# Patient Record
Sex: FEMALE | Race: WHITE
[De-identification: names, ages, dates, MRNs, and addresses within clinical notes are randomized per-mention and may not be internally consistent; named-entity substitution may affect disease eponyms.]

---

## 2024-10-14 ENCOUNTER — OFFICE VISIT (OUTPATIENT)
Dept: PRIMARY CARE CLINIC | Facility: CLINIC | Age: 64
End: 2024-10-14
Payer: COMMERCIAL

## 2024-10-14 VITALS
TEMPERATURE: 98.7 F | BODY MASS INDEX: 33.85 KG/M2 | RESPIRATION RATE: 18 BRPM | DIASTOLIC BLOOD PRESSURE: 91 MMHG | WEIGHT: 210.6 LBS | SYSTOLIC BLOOD PRESSURE: 158 MMHG | HEART RATE: 98 BPM | HEIGHT: 66 IN

## 2024-10-14 DIAGNOSIS — R03.0 ELEVATED BLOOD PRESSURE READING IN OFFICE WITHOUT DIAGNOSIS OF HYPERTENSION: ICD-10-CM

## 2024-10-14 DIAGNOSIS — N64.4 BREAST PAIN, RIGHT: Primary | ICD-10-CM

## 2024-10-14 DIAGNOSIS — N64.89 BREAST EDEMA: ICD-10-CM

## 2024-10-14 PROBLEM — M79.672 LEFT FOOT PAIN: Status: RESOLVED | Noted: 2019-09-11 | Resolved: 2024-10-14

## 2024-10-14 PROCEDURE — 99203 OFFICE O/P NEW LOW 30 MIN: CPT | Performed by: NURSE PRACTITIONER

## 2024-10-14 RX ORDER — CEPHALEXIN 500 MG/1
500 CAPSULE ORAL 4 TIMES DAILY
Qty: 28 CAPSULE | Refills: 0 | Status: SHIPPED | OUTPATIENT
Start: 2024-10-14 | End: 2024-10-21

## 2024-10-14 ASSESSMENT — ENCOUNTER SYMPTOMS
BREAST PAIN: 1
SHORTNESS OF BREATH: 1

## 2024-10-14 NOTE — PROGRESS NOTES
for this visit on 10/14/24.     Assessment/Plan:  1. Breast pain, right  Comments:  Obtain imgaing of breast stat. Begin Keflex. Advise Tylenol prn. Discussed concern for symptoms and importance of obtaining imaging. ER precautions given.  Orders:  -     [facility] DIGITAL DIAGNOSTIC W OR WO CAD BILATERAL; Future  -     US BREAST COMPLETE RIGHT; Future  -     cephALEXin (KEFLEX) 500 MG capsule; Take 1 capsule by mouth 4 times daily for 7 days, Disp-28 capsule, R-0Normal  2. Breast edema  -     [facility] DIGITAL DIAGNOSTIC W OR WO CAD BILATERAL; Future  -     US BREAST COMPLETE RIGHT; Future  -     cephALEXin (KEFLEX) 500 MG capsule; Take 1 capsule by mouth 4 times daily for 7 days, Disp-28 capsule, R-0Normal  3. Elevated blood pressure reading in office without diagnosis of hypertension  Comments:  Pt has BP cuff at home. Obtain home readings and bring Bp log to f/u.         Return in about 2 weeks (around 10/28/2024), or if symptoms worsen or fail to improve, for follow-up, blood pressure check.      YARON Linares - NP

## 2024-10-24 ENCOUNTER — HOSPITAL ENCOUNTER (OUTPATIENT)
Dept: MAMMOGRAPHY | Age: 64
Discharge: HOME | End: 2024-10-27
Payer: COMMERCIAL

## 2024-10-24 VITALS — BODY MASS INDEX: 33.75 KG/M2 | WEIGHT: 210 LBS | HEIGHT: 66 IN

## 2024-10-24 DIAGNOSIS — N64.89 BREAST EDEMA: ICD-10-CM

## 2024-10-24 DIAGNOSIS — N64.4 BREAST PAIN, RIGHT: ICD-10-CM

## 2024-10-24 PROCEDURE — G0279 TOMOSYNTHESIS, MAMMO: HCPCS

## 2024-10-24 PROCEDURE — 76642 ULTRASOUND BREAST LIMITED: CPT

## 2024-10-30 ENCOUNTER — HOSPITAL ENCOUNTER (OUTPATIENT)
Dept: MAMMOGRAPHY | Age: 64
Discharge: HOME | End: 2024-11-02
Payer: COMMERCIAL

## 2024-10-30 DIAGNOSIS — R92.8 ABNORMAL MAMMOGRAM OF RIGHT BREAST: ICD-10-CM

## 2024-10-30 DIAGNOSIS — R92.8 ABNORMAL ULTRASOUND OF BREAST: ICD-10-CM

## 2024-10-30 PROCEDURE — 88305 TISSUE EXAM BY PATHOLOGIST: CPT

## 2024-10-30 PROCEDURE — 19083 BX BREAST 1ST LESION US IMAG: CPT

## 2024-10-30 PROCEDURE — 2500000003 HC RX 250 WO HCPCS: Performed by: NURSE PRACTITIONER

## 2024-10-30 PROCEDURE — 38505 NEEDLE BIOPSY LYMPH NODES: CPT

## 2024-10-30 PROCEDURE — 77065 DX MAMMO INCL CAD UNI: CPT

## 2024-10-30 RX ORDER — LIDOCAINE HYDROCHLORIDE AND EPINEPHRINE 10; 10 MG/ML; UG/ML
20 INJECTION, SOLUTION INFILTRATION; PERINEURAL ONCE
Status: COMPLETED | OUTPATIENT
Start: 2024-10-30 | End: 2024-10-30

## 2024-10-30 RX ORDER — LIDOCAINE HYDROCHLORIDE 10 MG/ML
5 INJECTION, SOLUTION INFILTRATION; PERINEURAL ONCE
Status: COMPLETED | OUTPATIENT
Start: 2024-10-30 | End: 2024-10-30

## 2024-10-30 RX ADMIN — LIDOCAINE HYDROCHLORIDE,EPINEPHRINE BITARTRATE 10 ML: 10; .01 INJECTION, SOLUTION INFILTRATION; PERINEURAL at 08:32

## 2024-10-30 RX ADMIN — LIDOCAINE HYDROCHLORIDE 5 ML: 10 INJECTION, SOLUTION INFILTRATION; PERINEURAL at 08:31

## 2024-10-30 ASSESSMENT — PAIN SCALES - GENERAL
PAINLEVEL_OUTOF10: 1
PAINLEVEL_OUTOF10: 1

## 2024-11-04 ENCOUNTER — CLINICAL DOCUMENTATION (OUTPATIENT)
Dept: MAMMOGRAPHY | Age: 64
End: 2024-11-04

## 2024-11-04 NOTE — PROGRESS NOTES
Patient and her mother came to the breast center today to review the results of her breast biopsy. Pathology:Phyllodes tumor, axilla benign. She had no post biopsy issues or concerns however the mass is quite large and extremely uncomfortable for her.    Based on the pathology report this tumor is considered \"cytologic atypia\". These features are suspicious for malignancy. She will meet with Dr Cabral for her next steps. Dr Washington does not believe that an MRI will be suitable due to the increased size of her breast.    I explained that we only got a small sample of a very large mass and we may not know for sure if there are any malignant cells until it is removed in its entirety.

## 2024-11-21 ENCOUNTER — OFFICE VISIT (OUTPATIENT)
Dept: SURGERY | Age: 64
End: 2024-11-21
Payer: COMMERCIAL

## 2024-11-21 VITALS — BODY MASS INDEX: 33.75 KG/M2 | WEIGHT: 210 LBS | HEIGHT: 66 IN

## 2024-11-21 DIAGNOSIS — B37.2 SKIN YEAST INFECTION: ICD-10-CM

## 2024-11-21 DIAGNOSIS — D49.2 SOFT TISSUE NEOPLASM: ICD-10-CM

## 2024-11-21 DIAGNOSIS — C80.1 MALIGNANT NEOPLASM (HCC): ICD-10-CM

## 2024-11-21 DIAGNOSIS — N63.10 LARGE MASS OF RIGHT BREAST: Primary | ICD-10-CM

## 2024-11-21 DIAGNOSIS — Z85.3 PERSONAL HISTORY OF MALIGNANT PHYLLOIDES TUMOR OF BREAST: ICD-10-CM

## 2024-11-21 PROCEDURE — 99204 OFFICE O/P NEW MOD 45 MIN: CPT | Performed by: SURGERY

## 2024-11-21 RX ORDER — FLUCONAZOLE 150 MG/1
150 TABLET ORAL DAILY
Qty: 3 TABLET | Refills: 0 | Status: SHIPPED | OUTPATIENT
Start: 2024-11-21 | End: 2024-11-24

## 2024-11-21 NOTE — PROGRESS NOTES
?Review of prior external note(s) from each unique source*;  ?Review of the result(s) of each unique test*;   ?Ordering of each unique test*;   ?Assessment requiring an independent historian(s)    or   Category 2: Independent interpretation of tests   ?Independent interpretation of a test performed by another physician/other qualified health care professional (not separately reported);     or  Category 3: Discussion of management or test interpretation  ?Discussion of management or test interpretation with external physician/other qualified health care professional/appropriate source (not separately reported)   High risk of morbidity from additional diagnostic testing or treatment  Examples only:  ?Drug therapy requiring intensive monitoring for toxicity  ?Decision regarding elective major surgery with identified patient or procedure risk factors - no surgery yet  ?Decision regarding emergency major surgery  ?Decision regarding hospitalization  ?Decision not to resuscitate or to de-escalate care because of poor prognosis      Parenteral controlled substances             I have personally performed a face-to-face diagnostic evaluation and management  service on this patient.      I have independently seen the patient.   I have independently obtained the above history from the patient/family.    I have independently examined the patient with above findings.  I have independently reviewed data/labs for this patient and developed the above plan of care (MDM).      Signed: MICKY POWERS MD  11/21/2024    11:13 AM

## 2024-11-25 ENCOUNTER — APPOINTMENT (OUTPATIENT)
Dept: GENERAL RADIOLOGY | Age: 64
End: 2024-11-25
Payer: COMMERCIAL

## 2024-11-25 ENCOUNTER — HOSPITAL ENCOUNTER (EMERGENCY)
Age: 64
Discharge: HOME | End: 2024-11-26
Attending: STUDENT IN AN ORGANIZED HEALTH CARE EDUCATION/TRAINING PROGRAM
Payer: COMMERCIAL

## 2024-11-25 DIAGNOSIS — R11.2 NAUSEA AND VOMITING, UNSPECIFIED VOMITING TYPE: ICD-10-CM

## 2024-11-25 DIAGNOSIS — N30.00 ACUTE CYSTITIS WITHOUT HEMATURIA: ICD-10-CM

## 2024-11-25 DIAGNOSIS — R52 BODY ACHES: Primary | ICD-10-CM

## 2024-11-25 LAB
BASOPHILS # BLD: 0 K/UL (ref 0–0.2)
BASOPHILS NFR BLD: 1 % (ref 0–2)
DIFFERENTIAL METHOD BLD: ABNORMAL
EOSINOPHIL # BLD: 0 K/UL (ref 0–0.8)
EOSINOPHIL NFR BLD: 0 % (ref 0.5–7.8)
ERYTHROCYTE [DISTWIDTH] IN BLOOD BY AUTOMATED COUNT: 12.1 % (ref 11.9–14.6)
HCT VFR BLD AUTO: 36.8 % (ref 35.8–46.3)
HGB BLD-MCNC: 11.9 G/DL (ref 11.7–15.4)
IMM GRANULOCYTES # BLD AUTO: 0 K/UL (ref 0–0.5)
IMM GRANULOCYTES NFR BLD AUTO: 0 % (ref 0–5)
LYMPHOCYTES # BLD: 1.4 K/UL (ref 0.5–4.6)
LYMPHOCYTES NFR BLD: 16 % (ref 13–44)
MCH RBC QN AUTO: 28.5 PG (ref 26.1–32.9)
MCHC RBC AUTO-ENTMCNC: 32.3 G/DL (ref 31.4–35)
MCV RBC AUTO: 88 FL (ref 82–102)
MONOCYTES # BLD: 0.6 K/UL (ref 0.1–1.3)
MONOCYTES NFR BLD: 7 % (ref 4–12)
NEUTS SEG # BLD: 6.5 K/UL (ref 1.7–8.2)
NEUTS SEG NFR BLD: 76 % (ref 43–78)
NRBC # BLD: 0 K/UL (ref 0–0.2)
PLATELET # BLD AUTO: 411 K/UL (ref 150–450)
PMV BLD AUTO: 9.8 FL (ref 9.4–12.3)
RBC # BLD AUTO: 4.18 M/UL (ref 4.05–5.2)
WBC # BLD AUTO: 8.5 K/UL (ref 4.3–11.1)

## 2024-11-25 PROCEDURE — 6360000002 HC RX W HCPCS: Performed by: STUDENT IN AN ORGANIZED HEALTH CARE EDUCATION/TRAINING PROGRAM

## 2024-11-25 PROCEDURE — 71046 X-RAY EXAM CHEST 2 VIEWS: CPT

## 2024-11-25 PROCEDURE — 2580000003 HC RX 258: Performed by: STUDENT IN AN ORGANIZED HEALTH CARE EDUCATION/TRAINING PROGRAM

## 2024-11-25 PROCEDURE — 80053 COMPREHEN METABOLIC PANEL: CPT

## 2024-11-25 PROCEDURE — 84145 PROCALCITONIN (PCT): CPT

## 2024-11-25 PROCEDURE — 85025 COMPLETE CBC W/AUTO DIFF WBC: CPT

## 2024-11-25 PROCEDURE — 96374 THER/PROPH/DIAG INJ IV PUSH: CPT

## 2024-11-25 PROCEDURE — 83605 ASSAY OF LACTIC ACID: CPT

## 2024-11-25 PROCEDURE — 93005 ELECTROCARDIOGRAM TRACING: CPT | Performed by: STUDENT IN AN ORGANIZED HEALTH CARE EDUCATION/TRAINING PROGRAM

## 2024-11-25 PROCEDURE — 99285 EMERGENCY DEPT VISIT HI MDM: CPT

## 2024-11-25 RX ORDER — 0.9 % SODIUM CHLORIDE 0.9 %
1000 INTRAVENOUS SOLUTION INTRAVENOUS
Status: COMPLETED | OUTPATIENT
Start: 2024-11-25 | End: 2024-11-26

## 2024-11-25 RX ORDER — ONDANSETRON 2 MG/ML
4 INJECTION INTRAMUSCULAR; INTRAVENOUS
Status: COMPLETED | OUTPATIENT
Start: 2024-11-25 | End: 2024-11-25

## 2024-11-25 RX ADMIN — ONDANSETRON 4 MG: 2 INJECTION, SOLUTION INTRAMUSCULAR; INTRAVENOUS at 23:46

## 2024-11-25 RX ADMIN — SODIUM CHLORIDE 1000 ML: 9 INJECTION, SOLUTION INTRAVENOUS at 23:46

## 2024-11-25 ASSESSMENT — LIFESTYLE VARIABLES
HOW OFTEN DO YOU HAVE A DRINK CONTAINING ALCOHOL: NEVER
HOW MANY STANDARD DRINKS CONTAINING ALCOHOL DO YOU HAVE ON A TYPICAL DAY: PATIENT DOES NOT DRINK

## 2024-11-25 ASSESSMENT — PAIN - FUNCTIONAL ASSESSMENT: PAIN_FUNCTIONAL_ASSESSMENT: NONE - DENIES PAIN

## 2024-11-26 ENCOUNTER — APPOINTMENT (OUTPATIENT)
Dept: CT IMAGING | Age: 64
End: 2024-11-26
Payer: COMMERCIAL

## 2024-11-26 VITALS
HEART RATE: 101 BPM | RESPIRATION RATE: 20 BRPM | WEIGHT: 205 LBS | DIASTOLIC BLOOD PRESSURE: 74 MMHG | SYSTOLIC BLOOD PRESSURE: 141 MMHG | OXYGEN SATURATION: 96 % | HEIGHT: 66 IN | BODY MASS INDEX: 32.95 KG/M2 | TEMPERATURE: 98.6 F

## 2024-11-26 LAB
ALBUMIN SERPL-MCNC: 2.6 G/DL (ref 3.2–4.6)
ALBUMIN/GLOB SERPL: 0.5 (ref 1–1.9)
ALP SERPL-CCNC: 189 U/L (ref 35–104)
ALT SERPL-CCNC: 24 U/L (ref 8–45)
ANION GAP SERPL CALC-SCNC: 16 MMOL/L (ref 7–16)
AST SERPL-CCNC: 33 U/L (ref 15–37)
BACTERIA URNS QL MICRO: ABNORMAL /HPF
BILIRUB SERPL-MCNC: 0.4 MG/DL (ref 0–1.2)
BILIRUB UR QL: ABNORMAL
BUN SERPL-MCNC: 7 MG/DL (ref 8–23)
CALCIUM SERPL-MCNC: 9.6 MG/DL (ref 8.8–10.2)
CASTS URNS QL MICRO: 0 /LPF
CHLORIDE SERPL-SCNC: 98 MMOL/L (ref 98–107)
CO2 SERPL-SCNC: 22 MMOL/L (ref 20–29)
CREAT SERPL-MCNC: 0.62 MG/DL (ref 0.6–1.1)
CRYSTALS URNS QL MICRO: 0 /LPF
D DIMER PPP FEU-MCNC: 1.15 UG/ML(FEU)
EKG ATRIAL RATE: 109 BPM
EKG DIAGNOSIS: NORMAL
EKG P AXIS: 54 DEGREES
EKG P-R INTERVAL: 142 MS
EKG Q-T INTERVAL: 338 MS
EKG QRS DURATION: 72 MS
EKG QTC CALCULATION (BAZETT): 455 MS
EKG R AXIS: 38 DEGREES
EKG T AXIS: 73 DEGREES
EKG VENTRICULAR RATE: 109 BPM
EPI CELLS #/AREA URNS HPF: ABNORMAL /HPF
GLOBULIN SER CALC-MCNC: 5.4 G/DL (ref 2.3–3.5)
GLUCOSE SERPL-MCNC: 119 MG/DL (ref 70–99)
GLUCOSE UR QL STRIP.AUTO: NEGATIVE MG/DL
KETONES UR-MCNC: >160 MG/DL
LACTATE SERPL-SCNC: 1.7 MMOL/L (ref 0.5–2)
LEUKOCYTE ESTERASE UR QL STRIP: ABNORMAL
MUCOUS THREADS URNS QL MICRO: ABNORMAL /LPF
NITRITE UR QL: NEGATIVE
OTHER OBSERVATIONS: ABNORMAL
PH UR: 6 (ref 5–9)
POTASSIUM SERPL-SCNC: 3.9 MMOL/L (ref 3.5–5.1)
PROCALCITONIN SERPL-MCNC: 0.08 NG/ML (ref 0–0.1)
PROT SERPL-MCNC: 8 G/DL (ref 6.3–8.2)
PROT UR QL: 100 MG/DL
RBC # UR STRIP: ABNORMAL
RBC #/AREA URNS HPF: ABNORMAL /HPF
SERVICE CMNT-IMP: ABNORMAL
SODIUM SERPL-SCNC: 135 MMOL/L (ref 136–145)
SP GR UR: >1.03 (ref 1–1.02)
UROBILINOGEN UR QL: 1 EU/DL (ref 0.2–1)
WBC URNS QL MICRO: ABNORMAL /HPF

## 2024-11-26 PROCEDURE — 93010 ELECTROCARDIOGRAM REPORT: CPT | Performed by: INTERNAL MEDICINE

## 2024-11-26 PROCEDURE — 81001 URINALYSIS AUTO W/SCOPE: CPT

## 2024-11-26 PROCEDURE — 96375 TX/PRO/DX INJ NEW DRUG ADDON: CPT

## 2024-11-26 PROCEDURE — 6360000002 HC RX W HCPCS: Performed by: STUDENT IN AN ORGANIZED HEALTH CARE EDUCATION/TRAINING PROGRAM

## 2024-11-26 PROCEDURE — 71260 CT THORAX DX C+: CPT

## 2024-11-26 PROCEDURE — 2580000003 HC RX 258: Performed by: EMERGENCY MEDICINE

## 2024-11-26 PROCEDURE — 6360000004 HC RX CONTRAST MEDICATION: Performed by: EMERGENCY MEDICINE

## 2024-11-26 PROCEDURE — 85379 FIBRIN DEGRADATION QUANT: CPT

## 2024-11-26 RX ORDER — METOCLOPRAMIDE HYDROCHLORIDE 5 MG/ML
10 INJECTION INTRAMUSCULAR; INTRAVENOUS ONCE
Status: COMPLETED | OUTPATIENT
Start: 2024-11-26 | End: 2024-11-26

## 2024-11-26 RX ORDER — CEPHALEXIN 500 MG/1
500 CAPSULE ORAL 2 TIMES DAILY
Qty: 14 CAPSULE | Refills: 0 | Status: SHIPPED | OUTPATIENT
Start: 2024-11-26 | End: 2024-12-03

## 2024-11-26 RX ORDER — IOPAMIDOL 755 MG/ML
100 INJECTION, SOLUTION INTRAVASCULAR
Status: COMPLETED | OUTPATIENT
Start: 2024-11-26 | End: 2024-11-26

## 2024-11-26 RX ORDER — 0.9 % SODIUM CHLORIDE 0.9 %
1000 INTRAVENOUS SOLUTION INTRAVENOUS ONCE
Status: COMPLETED | OUTPATIENT
Start: 2024-11-26 | End: 2024-11-26

## 2024-11-26 RX ORDER — ONDANSETRON 4 MG/1
4 TABLET, FILM COATED ORAL 3 TIMES DAILY PRN
Qty: 15 TABLET | Refills: 0 | Status: SHIPPED | OUTPATIENT
Start: 2024-11-26

## 2024-11-26 RX ORDER — DIPHENHYDRAMINE HYDROCHLORIDE 50 MG/ML
12.5 INJECTION INTRAMUSCULAR; INTRAVENOUS
Status: COMPLETED | OUTPATIENT
Start: 2024-11-26 | End: 2024-11-26

## 2024-11-26 RX ADMIN — SODIUM CHLORIDE 1000 ML: 9 INJECTION, SOLUTION INTRAVENOUS at 02:07

## 2024-11-26 RX ADMIN — METOCLOPRAMIDE 10 MG: 5 INJECTION, SOLUTION INTRAMUSCULAR; INTRAVENOUS at 01:26

## 2024-11-26 RX ADMIN — IOPAMIDOL 100 ML: 755 INJECTION, SOLUTION INTRAVENOUS at 01:47

## 2024-11-26 RX ADMIN — DIPHENHYDRAMINE HYDROCHLORIDE 12.5 MG: 50 INJECTION INTRAMUSCULAR; INTRAVENOUS at 01:26

## 2024-11-26 NOTE — ED PROVIDER NOTES
History     Socioeconomic History    Marital status: Unknown   Tobacco Use    Smoking status: Unknown     Social Determinants of Health     Social Connections: Unknown (3/19/2021)    Received from [facility]    Social Connections     Frequency of Communication with Friends and Family: Not asked     Frequency of Social Gatherings with Friends and Family: Not asked   Intimate Partner Violence: Unknown (3/19/2021)    Received from [facility]    Intimate Partner Violence     Fear of Current or Ex-Partner: Not asked     Emotionally Abused: Not asked     Physically Abused: Not asked     Sexually Abused: Not asked   Housing Stability: Not At Risk (3/9/2022)    Received from [facility]    Housing Stability     Was there a time when you did not have a steady place to sleep: Not asked     Worried that the place you are staying is making you sick: Not asked        Previous Medications    No medications on file        No results found for any visits on 11/25/24.      XR CHEST (2 VW)    (Results Pending)                No results for input(s): \"COVID19\" in the last 72 hours.    Voice dictation software was used during the making of this note.  This software is not perfect and grammatical and other typographical errors may be present.  This note has not been completely proofread for errors.    7.8 %    Basophils % 1 0.0 - 2.0 %    Immature Granulocytes % 0 0.0 - 5.0 %    Neutrophils Absolute 6.5 1.7 - 8.2 K/UL    Lymphocytes Absolute 1.4 0.5 - 4.6 K/UL    Monocytes Absolute 0.6 0.1 - 1.3 K/UL    Eosinophils Absolute 0.0 0.0 - 0.8 K/UL    Basophils Absolute 0.0 0.0 - 0.2 K/UL    Immature Granulocytes Absolute 0.0 0.0 - 0.5 K/UL   Comprehensive Metabolic Panel   Result Value Ref Range    Sodium 135 (L) 136 - 145 mmol/L    Potassium 3.9 3.5 - 5.1 mmol/L    Chloride 98 98 - 107 mmol/L    CO2 22 20 - 29 mmol/L    Anion Gap 16 7 - 16 mmol/L    Glucose 119 (H) 70 - 99 mg/dL    BUN 7 (L) 8 - 23 MG/DL    Creatinine 0.62 0.60 - 1.10 MG/DL    Est, Glom Filt Rate >90 >60 ml/min/1.73m2    Calcium 9.6 8.8 - 10.2 MG/DL    Total Bilirubin 0.4 0.0 - 1.2 MG/DL    ALT 24 8 - 45 U/L    AST 33 15 - 37 U/L    Alk Phosphatase 189 (H) 35 - 104 U/L    Total Protein 8.0 6.3 - 8.2 g/dL    Albumin 2.6 (L) 3.2 - 4.6 g/dL    Globulin 5.4 (H) 2.3 - 3.5 g/dL    Albumin/Globulin Ratio 0.5 (L) 1.0 - 1.9     Lactic Acid   Result Value Ref Range    Lactic Acid 1.7 0.5 - 2.0 mmol/L   Procalcitonin   Result Value Ref Range    Procalcitonin 0.08 0.00 - 0.10 ng/mL   D-Dimer, Quantitative   Result Value Ref Range    D-Dimer, Quant 1.15 (H) <0.56 ug/ml(FEU)   Urinalysis, Micro   Result Value Ref Range    WBC, UA 5-10 0 /hpf    RBC, UA 0-3 0 /hpf    Epithelial Cells, UA 0-3 0 /hpf    BACTERIA, URINE 2+ (H) 0 /hpf    Casts 0 0 /lpf    Crystals 0 0 /LPF    Mucus, UA TRACE 0 /lpf    Other observations RESULTS VERIFIED MANUALLY     POCT Urinalysis no Micro   Result Value Ref Range    Specific Gravity, Urine, POC >1.030 (H) 1.001 - 1.023    pH, Urine, POC 6.0 5.0 - 9.0      Protein, Urine,  (A) NEG mg/dL    Glucose, UA POC Negative NEG mg/dL    Ketones, Urine, POC >160 (A) NEG mg/dL    Bilirubin, Urine, POC MODERATE (A) NEG      Blood, UA POC SMALL (A) NEG      URINE UROBILINOGEN POC 1.0 0.2 - 1.0 EU/dL    Nitrite, Urine, POC Negative NEG

## 2024-11-26 NOTE — ED NOTES
Verbal report given to CURT Ye for continuation of patient care upon shift change. Patient care transferred at this time.       Christy Peters RN  11/26/24 0254

## 2024-11-26 NOTE — ED TRIAGE NOTES
Patient states biopsies of breast cancer two weeks ago and since then she has gotten nauseated, chills, and upset stomach. Patient state she doesn't feel like she can wait 20 days to have breast off or for follow up.

## 2024-11-26 NOTE — ED PROVIDER NOTES
Emergency Department Provider Signout / Continuation of Care Note         DISPOSITION Decision To Discharge 11/26/2024 04:29:59 AM       ICD-10-CM    1. Body aches  R52       2. Nausea and vomiting, unspecified vomiting type  R11.2           The patient's care was signed out to me at shift change by Dr. Barker.    Final Plan      Patient is feeling much better on reevaluation.  No further vomiting tolerating p.o.  Her urinalysis did have some bacteria in it and her D-dimer was positive this over the CT scan but thankfully shows no pulmonary embolism or lung abnormality.  She is comfortable going home with close outpatient follow-up.  Will treat with Zofran and Keflex.                                Elis Mahmood MD  11/26/24 0436

## 2024-11-26 NOTE — ED NOTES
Pt provided with cup of ice water to initiate PO challenge     Christy Peters, RN  11/26/24 0111

## 2024-12-11 ENCOUNTER — HOSPITAL ENCOUNTER (OUTPATIENT)
Dept: PET IMAGING | Age: 64
Discharge: HOME | End: 2024-12-14
Payer: COMMERCIAL

## 2024-12-11 DIAGNOSIS — N63.10 LARGE MASS OF RIGHT BREAST: ICD-10-CM

## 2024-12-11 DIAGNOSIS — C80.1 MALIGNANT NEOPLASM (HCC): ICD-10-CM

## 2024-12-11 DIAGNOSIS — Z85.3 PERSONAL HISTORY OF MALIGNANT PHYLLOIDES TUMOR OF BREAST: ICD-10-CM

## 2024-12-11 DIAGNOSIS — D49.2 SOFT TISSUE NEOPLASM: ICD-10-CM

## 2024-12-11 LAB
GLUCOSE BLD STRIP.AUTO-MCNC: 117 MG/DL (ref 65–100)
SERVICE CMNT-IMP: ABNORMAL

## 2024-12-11 PROCEDURE — A9609 HC RX DIAGNOSTIC RADIOPHARMACEUTICAL: HCPCS | Performed by: SURGERY

## 2024-12-11 PROCEDURE — 82962 GLUCOSE BLOOD TEST: CPT

## 2024-12-11 PROCEDURE — 2580000003 HC RX 258: Performed by: SURGERY

## 2024-12-11 PROCEDURE — 78815 PET IMAGE W/CT SKULL-THIGH: CPT

## 2024-12-11 PROCEDURE — 3430000000 HC RX DIAGNOSTIC RADIOPHARMACEUTICAL: Performed by: SURGERY

## 2024-12-11 RX ORDER — SODIUM CHLORIDE 0.9 % (FLUSH) 0.9 %
20 SYRINGE (ML) INJECTION AS NEEDED
Status: DISCONTINUED | OUTPATIENT
Start: 2024-12-11 | End: 2024-12-15 | Stop reason: HOSPADM

## 2024-12-11 RX ORDER — FLUDEOXYGLUCOSE F 18 200 MCI/ML
11.5 INJECTION, SOLUTION INTRAVENOUS
Status: COMPLETED | OUTPATIENT
Start: 2024-12-11 | End: 2024-12-11

## 2024-12-11 RX ADMIN — FLUDEOXYGLUCOSE F 18 11.5 MILLICURIE: 200 INJECTION, SOLUTION INTRAVENOUS at 10:58

## 2024-12-11 RX ADMIN — SODIUM CHLORIDE, PRESERVATIVE FREE 20 ML: 5 INJECTION INTRAVENOUS at 10:58

## 2024-12-12 ENCOUNTER — OFFICE VISIT (OUTPATIENT)
Dept: SURGERY | Age: 64
End: 2024-12-12

## 2024-12-12 DIAGNOSIS — R94.8 ABNORMAL POSITRON EMISSION TOMOGRAPHY (PET) SCAN: ICD-10-CM

## 2024-12-12 DIAGNOSIS — N63.10 LARGE MASS OF RIGHT BREAST: ICD-10-CM

## 2024-12-12 DIAGNOSIS — R59.0 AXILLARY ADENOPATHY: Primary | ICD-10-CM

## 2024-12-12 DIAGNOSIS — Z85.3 PERSONAL HISTORY OF MALIGNANT PHYLLOIDES TUMOR OF BREAST: ICD-10-CM

## 2024-12-12 NOTE — PROGRESS NOTES
discussion of management or test interpretation, see moderate or high) Low risk of morbidity from additional diagnostic testing or treatment     36434  55574 Mod Moderate  ?1or more chronic illnesses with exacerbation, progression, or side effects of treatment;    or  ?2or more stable chronic illnesses;    or  ?1undiagnosed new problem with uncertain prognosis;    or  ?1acute illness with systemic symptoms;    or  ?1acute complicated injury   Moderate  (Must meet the requirements of at least 1 out of 3 categories)  Category 1: Tests, documents, or independent historian(s)  ?Any combination of 3 from the following:   ?Review of prior external note(s) from each unique source*;  ?Review of the result(s) of each unique test*;  ?Ordering of each unique test*;  ?Assessment requiring an independent historian(s)    or  Category 2: Independent interpretation of tests   ?Independent interpretation of a test performed by another physician/other qualified health care professional (not separately reported);     or  Category 3: Discussion of management or test interpretation  ?Discussion of management or test interpretation with external physician/other qualified health care professional/appropriate source (not separately reported)   Moderate risk of morbidity from additional diagnostic testing or treatment  Examples only:  ?Prescription drug management   ?Decision regarding minor surgery with identified patient or procedure risk factors  ?Decision regarding elective major surgery without identified patient or procedure risk factors   ?Diagnosis or treatment significantly limited by social determinants of health       79327  40648 High High  ?1or more chronic illnesses with severe exacerbation, progression, or side effects of treatment;    or  ?1 acute or chronic illness or injury that poses a threat to life or bodily function   Extensive  (Must meet the requirements of at least 2 out of 3 categories)  Category 1: Tests,

## 2024-12-17 ENCOUNTER — HOSPITAL ENCOUNTER (INPATIENT)
Age: 64
LOS: 2 days | Discharge: HOME | DRG: 988 | End: 2024-12-22
Admitting: INTERNAL MEDICINE
Payer: COMMERCIAL

## 2024-12-17 ENCOUNTER — APPOINTMENT (OUTPATIENT)
Dept: CT IMAGING | Age: 64
DRG: 988 | End: 2024-12-17
Payer: COMMERCIAL

## 2024-12-17 DIAGNOSIS — R11.2 NAUSEA AND VOMITING, UNSPECIFIED VOMITING TYPE: Primary | ICD-10-CM

## 2024-12-17 DIAGNOSIS — N63.0 MASS OF BREAST, UNSPECIFIED LATERALITY: ICD-10-CM

## 2024-12-17 LAB
ALBUMIN SERPL-MCNC: 2 G/DL (ref 3.2–4.6)
ALBUMIN/GLOB SERPL: 0.4 (ref 1–1.9)
ALP SERPL-CCNC: 150 U/L (ref 35–104)
ALT SERPL-CCNC: 25 U/L (ref 8–45)
ANION GAP SERPL CALC-SCNC: 22 MMOL/L (ref 7–16)
APPEARANCE UR: CLEAR
AST SERPL-CCNC: 63 U/L (ref 15–37)
BACTERIA URNS QL MICRO: ABNORMAL /HPF
BASOPHILS # BLD: 0 K/UL (ref 0–0.2)
BASOPHILS NFR BLD: 0 % (ref 0–2)
BILIRUB SERPL-MCNC: 0.9 MG/DL (ref 0–1.2)
BILIRUB UR QL: NEGATIVE
BUN SERPL-MCNC: 11 MG/DL (ref 8–23)
CALCIUM SERPL-MCNC: 9.4 MG/DL (ref 8.8–10.2)
CHLORIDE SERPL-SCNC: 89 MMOL/L (ref 98–107)
CO2 SERPL-SCNC: 23 MMOL/L (ref 20–29)
COLOR UR: ABNORMAL
CREAT SERPL-MCNC: 0.42 MG/DL (ref 0.6–1.1)
DIFFERENTIAL METHOD BLD: ABNORMAL
EOSINOPHIL # BLD: 0 K/UL (ref 0–0.8)
EOSINOPHIL NFR BLD: 0 % (ref 0.5–7.8)
EPI CELLS #/AREA URNS HPF: ABNORMAL /HPF
ERYTHROCYTE [DISTWIDTH] IN BLOOD BY AUTOMATED COUNT: 13.7 % (ref 11.9–14.6)
GLOBULIN SER CALC-MCNC: 5.7 G/DL (ref 2.3–3.5)
GLUCOSE SERPL-MCNC: 128 MG/DL (ref 70–99)
GLUCOSE UR STRIP.AUTO-MCNC: NEGATIVE MG/DL
HCT VFR BLD AUTO: 36.8 % (ref 35.8–46.3)
HGB BLD-MCNC: 12.1 G/DL (ref 11.7–15.4)
HGB UR QL STRIP: ABNORMAL
IMM GRANULOCYTES # BLD AUTO: 0.1 K/UL (ref 0–0.5)
IMM GRANULOCYTES NFR BLD AUTO: 1 % (ref 0–5)
KETONES UR QL STRIP.AUTO: 80 MG/DL
LACTATE SERPL-SCNC: 1.9 MMOL/L (ref 0.5–2)
LACTATE SERPL-SCNC: 2.3 MMOL/L (ref 0.5–2)
LEUKOCYTE ESTERASE UR QL STRIP.AUTO: NEGATIVE
LIPASE SERPL-CCNC: 20 U/L (ref 13–60)
LYMPHOCYTES # BLD: 0.7 K/UL (ref 0.5–4.6)
LYMPHOCYTES NFR BLD: 7 % (ref 13–44)
MCH RBC QN AUTO: 28.3 PG (ref 26.1–32.9)
MCHC RBC AUTO-ENTMCNC: 32.9 G/DL (ref 31.4–35)
MCV RBC AUTO: 86 FL (ref 82–102)
MONOCYTES # BLD: 0.6 K/UL (ref 0.1–1.3)
MONOCYTES NFR BLD: 6 % (ref 4–12)
NEUTS SEG # BLD: 9.2 K/UL (ref 1.7–8.2)
NEUTS SEG NFR BLD: 86 % (ref 43–78)
NITRITE UR QL STRIP.AUTO: NEGATIVE
NRBC # BLD: 0 K/UL (ref 0–0.2)
OTHER OBSERVATIONS: ABNORMAL
PH UR STRIP: 6 (ref 5–9)
PLATELET # BLD AUTO: 439 K/UL (ref 150–450)
PMV BLD AUTO: 9.6 FL (ref 9.4–12.3)
POTASSIUM SERPL-SCNC: 3.2 MMOL/L (ref 3.5–5.1)
PROT SERPL-MCNC: 7.7 G/DL (ref 6.3–8.2)
PROT UR STRIP-MCNC: 100 MG/DL
RBC # BLD AUTO: 4.28 M/UL (ref 4.05–5.2)
RBC #/AREA URNS HPF: ABNORMAL /HPF
SODIUM SERPL-SCNC: 134 MMOL/L (ref 136–145)
SP GR UR REFRACTOMETRY: >1.035 (ref 1–1.02)
UROBILINOGEN UR QL STRIP.AUTO: 4 EU/DL (ref 0.2–1)
WBC # BLD AUTO: 10.6 K/UL (ref 4.3–11.1)
WBC URNS QL MICRO: ABNORMAL /HPF
YEAST URNS QL MICRO: ABNORMAL

## 2024-12-17 PROCEDURE — 80053 COMPREHEN METABOLIC PANEL: CPT

## 2024-12-17 PROCEDURE — 96375 TX/PRO/DX INJ NEW DRUG ADDON: CPT

## 2024-12-17 PROCEDURE — 36415 COLL VENOUS BLD VENIPUNCTURE: CPT

## 2024-12-17 PROCEDURE — 96374 THER/PROPH/DIAG INJ IV PUSH: CPT

## 2024-12-17 PROCEDURE — 93005 ELECTROCARDIOGRAM TRACING: CPT

## 2024-12-17 PROCEDURE — 85025 COMPLETE CBC W/AUTO DIFF WBC: CPT

## 2024-12-17 PROCEDURE — 81001 URINALYSIS AUTO W/SCOPE: CPT

## 2024-12-17 PROCEDURE — 83690 ASSAY OF LIPASE: CPT

## 2024-12-17 PROCEDURE — 6360000002 HC RX W HCPCS

## 2024-12-17 PROCEDURE — 74177 CT ABD & PELVIS W/CONTRAST: CPT

## 2024-12-17 PROCEDURE — 87086 URINE CULTURE/COLONY COUNT: CPT

## 2024-12-17 PROCEDURE — 83605 ASSAY OF LACTIC ACID: CPT

## 2024-12-17 PROCEDURE — 96361 HYDRATE IV INFUSION ADD-ON: CPT

## 2024-12-17 PROCEDURE — 6360000004 HC RX CONTRAST MEDICATION

## 2024-12-17 PROCEDURE — 99285 EMERGENCY DEPT VISIT HI MDM: CPT

## 2024-12-17 PROCEDURE — 2580000003 HC RX 258

## 2024-12-17 RX ORDER — 0.9 % SODIUM CHLORIDE 0.9 %
1000 INTRAVENOUS SOLUTION INTRAVENOUS
Status: COMPLETED | OUTPATIENT
Start: 2024-12-17 | End: 2024-12-17

## 2024-12-17 RX ORDER — METOCLOPRAMIDE HYDROCHLORIDE 5 MG/ML
10 INJECTION INTRAMUSCULAR; INTRAVENOUS ONCE
Status: COMPLETED | OUTPATIENT
Start: 2024-12-17 | End: 2024-12-17

## 2024-12-17 RX ORDER — IOPAMIDOL 755 MG/ML
100 INJECTION, SOLUTION INTRAVASCULAR
Status: COMPLETED | OUTPATIENT
Start: 2024-12-17 | End: 2024-12-17

## 2024-12-17 RX ORDER — ONDANSETRON 2 MG/ML
4 INJECTION INTRAMUSCULAR; INTRAVENOUS
Status: COMPLETED | OUTPATIENT
Start: 2024-12-17 | End: 2024-12-17

## 2024-12-17 RX ADMIN — SODIUM CHLORIDE 1000 ML: 9 INJECTION, SOLUTION INTRAVENOUS at 20:40

## 2024-12-17 RX ADMIN — IOPAMIDOL 100 ML: 755 INJECTION, SOLUTION INTRAVENOUS at 20:54

## 2024-12-17 RX ADMIN — METOCLOPRAMIDE 10 MG: 5 INJECTION, SOLUTION INTRAMUSCULAR; INTRAVENOUS at 22:00

## 2024-12-17 RX ADMIN — ONDANSETRON 4 MG: 2 INJECTION, SOLUTION INTRAMUSCULAR; INTRAVENOUS at 20:40

## 2024-12-17 ASSESSMENT — PAIN - FUNCTIONAL ASSESSMENT: PAIN_FUNCTIONAL_ASSESSMENT: 0-10

## 2024-12-17 ASSESSMENT — PAIN SCALES - GENERAL: PAINLEVEL_OUTOF10: 6

## 2024-12-17 ASSESSMENT — PAIN DESCRIPTION - LOCATION: LOCATION: ABDOMEN

## 2024-12-17 NOTE — ED PROVIDER NOTES
Emergency Department Provider Note       PCP: Sara Newell APRN - NP   Age: 64 y.o.   Sex: female     DISPOSITION Admitted 12/18/2024 01:24:23 AM    ICD-10-CM    1. Nausea and vomiting, unspecified vomiting type  R11.2       2. Mass of breast, unspecified laterality  N63.0           Medical Decision Making     Differential diagnosis includes dehydration, electrolyte abnormality, intra-abdominal infection, JOSUE, partial list.    Patient presents to the ED with complaints of ongoing nausea vomiting for about the past month.  She does not have any significant abdominal tenderness but is actively vomiting on exam.    Lab work shows significant dehydration with anion gap of 22.  No significant leukocytosis.     Trialed multiple antiemetics however patient was not able to tolerate p.o. challenge.  Will start fluid resuscitation and admit for intractable nausea and vomiting.    ED Course as of 12/18/24 0124   Tue Dec 17, 2024   2230 CMP(!):    Sodium 134(!)   Potassium 3.2(!)   Chloride 89(!)   CARBON DIOXIDE 23   Anion Gap 22(!)   Glucose 128(!)   BUN,BUNPL 11   Creatinine 0.42(!)   Est, Glom Filt Rate >90   Calcium 9.4   Total Bilirubin 0.9   ALT 25   AST 63(!)   Alkaline Phosphatase 150(!)   Total Protein 7.7   Albumin 2.0(!)   Globulin 5.7(!)   Albumin/Globulin Ratio 0.4(!)  CMP shows mild hypokalemia with elevated anion gap, likely secondary to vomiting and dehydration. [MM]   2231 CT ABDOMEN PELVIS W IV CONTRAST Additional Contrast? None  No acute abnormality [MM]      ED Course User Index  [MM] Marianawhite, Bill, DO     1 acute illness with systemic symptoms.  Over the counter drug management performed.  Prescription drug management performed.  Chronic medical problems impacting care include breast mass.  I independently ordered and reviewed each unique test.           I interpreted the CT Scan no acute abnormality.              History     64-year-old female with history of right breast mass that is currently  being evaluated by general surgery, not currently on chemo or radiation that now presents to the ED with complaints of nausea and vomiting that has been getting progressively worse the past month.  Patient states she is no longer able to tolerate oral intake.  Has been prescribed Zofran by her primary care physician but states this is not helping.  Now endorses having upper abdominal pain and generalized weakness.  Also reports change in the color of her urine.    The history is provided by the patient.     Physical Exam     Vitals signs and nursing note reviewed:  Vitals:    12/18/24 0045 12/18/24 0047 12/18/24 0048 12/18/24 0100   BP: 119/64   118/66   Pulse:       Resp:       Temp:       TempSrc:       SpO2:  93% 97% 93%   Weight:       Height:          Physical Exam  Vitals and nursing note reviewed.   Constitutional:       General: She is not in acute distress.     Appearance: Normal appearance.   HENT:      Head: Normocephalic and atraumatic.      Mouth/Throat:      Comments: Dry mucous membranes  Eyes:      Extraocular Movements: Extraocular movements intact.      Pupils: Pupils are equal, round, and reactive to light.   Cardiovascular:      Rate and Rhythm: Regular rhythm. Tachycardia present.      Heart sounds: No murmur heard.  Pulmonary:      Effort: Pulmonary effort is normal.      Breath sounds: Normal breath sounds.   Abdominal:      General: Abdomen is flat.      Palpations: Abdomen is soft.      Tenderness: There is no abdominal tenderness. There is no guarding.   Musculoskeletal:         General: Normal range of motion.      Cervical back: Normal range of motion and neck supple.   Skin:     General: Skin is warm and dry.      Capillary Refill: Capillary refill takes less than 2 seconds.   Neurological:      General: No focal deficit present.      Mental Status: She is alert and oriented to person, place, and time.      Comments: Mild generalized weakness        Procedures     Procedures    Orders

## 2024-12-17 NOTE — ED TRIAGE NOTES
Pt to ED c/o nausea, vomiting x 1 month. Reports she's unable to keep any foods or water down. Pt states she has not had a bowel movement in 12 days.

## 2024-12-18 PROBLEM — R11.2 INTRACTABLE NAUSEA AND VOMITING: Status: ACTIVE | Noted: 2024-12-18

## 2024-12-18 PROBLEM — R11.2 NAUSEA AND VOMITING: Status: ACTIVE | Noted: 2024-12-18

## 2024-12-18 PROBLEM — N63.0 MASS OF BREAST: Status: ACTIVE | Noted: 2024-12-18

## 2024-12-18 PROBLEM — N39.0 ACUTE UTI: Status: ACTIVE | Noted: 2024-12-18

## 2024-12-18 PROBLEM — E87.29 HIGH ANION GAP METABOLIC ACIDOSIS: Status: ACTIVE | Noted: 2024-12-18

## 2024-12-18 PROBLEM — E87.6 HYPOKALEMIA: Status: ACTIVE | Noted: 2024-12-18

## 2024-12-18 PROBLEM — D48.60 PHYLLODES NEOPLASM OF BREAST: Status: ACTIVE | Noted: 2024-12-18

## 2024-12-18 LAB
AMPHET UR QL SCN: NEGATIVE
B PERT DNA SPEC QL NAA+PROBE: NOT DETECTED
BARBITURATES UR QL SCN: NEGATIVE
BENZODIAZ UR QL: NEGATIVE
BORDETELLA PARAPERTUSSIS BY PCR: NOT DETECTED
C PNEUM DNA SPEC QL NAA+PROBE: NOT DETECTED
CANNABINOIDS UR QL SCN: NEGATIVE
COCAINE UR QL SCN: NEGATIVE
EKG ATRIAL RATE: 113 BPM
EKG DIAGNOSIS: NORMAL
EKG P AXIS: 60 DEGREES
EKG P-R INTERVAL: 134 MS
EKG Q-T INTERVAL: 342 MS
EKG QRS DURATION: 78 MS
EKG QTC CALCULATION (BAZETT): 469 MS
EKG R AXIS: 3 DEGREES
EKG T AXIS: 56 DEGREES
EKG VENTRICULAR RATE: 113 BPM
EST. AVERAGE GLUCOSE BLD GHB EST-MCNC: 115 MG/DL
FLUAV SUBTYP SPEC NAA+PROBE: NOT DETECTED
FLUBV RNA SPEC QL NAA+PROBE: NOT DETECTED
GLUCOSE BLD STRIP.AUTO-MCNC: 101 MG/DL (ref 65–100)
HADV DNA SPEC QL NAA+PROBE: NOT DETECTED
HBA1C MFR BLD: 5.6 % (ref 0–5.6)
HCOV 229E RNA SPEC QL NAA+PROBE: NOT DETECTED
HCOV HKU1 RNA SPEC QL NAA+PROBE: NOT DETECTED
HCOV NL63 RNA SPEC QL NAA+PROBE: NOT DETECTED
HCOV OC43 RNA SPEC QL NAA+PROBE: NOT DETECTED
HMPV RNA SPEC QL NAA+PROBE: NOT DETECTED
HPIV1 RNA SPEC QL NAA+PROBE: NOT DETECTED
HPIV2 RNA SPEC QL NAA+PROBE: NOT DETECTED
HPIV3 RNA SPEC QL NAA+PROBE: NOT DETECTED
HPIV4 RNA SPEC QL NAA+PROBE: NOT DETECTED
M PNEUMO DNA SPEC QL NAA+PROBE: NOT DETECTED
MAGNESIUM SERPL-MCNC: 1.7 MG/DL (ref 1.8–2.4)
METHADONE UR QL: NEGATIVE
OPIATES UR QL: NEGATIVE
PCP UR QL: NEGATIVE
RSV RNA SPEC QL NAA+PROBE: NOT DETECTED
RV+EV RNA SPEC QL NAA+PROBE: NOT DETECTED
SARS-COV-2 RNA RESP QL NAA+PROBE: NOT DETECTED
SERVICE CMNT-IMP: ABNORMAL

## 2024-12-18 PROCEDURE — 83735 ASSAY OF MAGNESIUM: CPT

## 2024-12-18 PROCEDURE — APPSS60 APP SPLIT SHARED TIME 46-60 MINUTES: Performed by: NURSE PRACTITIONER

## 2024-12-18 PROCEDURE — 96372 THER/PROPH/DIAG INJ SC/IM: CPT

## 2024-12-18 PROCEDURE — 96361 HYDRATE IV INFUSION ADD-ON: CPT

## 2024-12-18 PROCEDURE — 2500000003 HC RX 250 WO HCPCS: Performed by: HOSPITALIST

## 2024-12-18 PROCEDURE — 82962 GLUCOSE BLOOD TEST: CPT

## 2024-12-18 PROCEDURE — G0378 HOSPITAL OBSERVATION PER HR: HCPCS

## 2024-12-18 PROCEDURE — 96367 TX/PROPH/DG ADDL SEQ IV INF: CPT

## 2024-12-18 PROCEDURE — 6360000002 HC RX W HCPCS: Performed by: HOSPITALIST

## 2024-12-18 PROCEDURE — 97165 OT EVAL LOW COMPLEX 30 MIN: CPT

## 2024-12-18 PROCEDURE — 80307 DRUG TEST PRSMV CHEM ANLYZR: CPT

## 2024-12-18 PROCEDURE — 97161 PT EVAL LOW COMPLEX 20 MIN: CPT

## 2024-12-18 PROCEDURE — 93010 ELECTROCARDIOGRAM REPORT: CPT | Performed by: INTERNAL MEDICINE

## 2024-12-18 PROCEDURE — 99223 1ST HOSP IP/OBS HIGH 75: CPT | Performed by: INTERNAL MEDICINE

## 2024-12-18 PROCEDURE — 36415 COLL VENOUS BLD VENIPUNCTURE: CPT

## 2024-12-18 PROCEDURE — 0202U NFCT DS 22 TRGT SARS-COV-2: CPT

## 2024-12-18 PROCEDURE — 97530 THERAPEUTIC ACTIVITIES: CPT

## 2024-12-18 PROCEDURE — 2580000003 HC RX 258: Performed by: HOSPITALIST

## 2024-12-18 PROCEDURE — 87040 BLOOD CULTURE FOR BACTERIA: CPT

## 2024-12-18 PROCEDURE — 96376 TX/PRO/DX INJ SAME DRUG ADON: CPT

## 2024-12-18 PROCEDURE — 96365 THER/PROPH/DIAG IV INF INIT: CPT

## 2024-12-18 PROCEDURE — 96375 TX/PRO/DX INJ NEW DRUG ADDON: CPT

## 2024-12-18 PROCEDURE — 96366 THER/PROPH/DIAG IV INF ADDON: CPT

## 2024-12-18 PROCEDURE — 83036 HEMOGLOBIN GLYCOSYLATED A1C: CPT

## 2024-12-18 PROCEDURE — 76937 US GUIDE VASCULAR ACCESS: CPT

## 2024-12-18 RX ORDER — SODIUM CHLORIDE, SODIUM LACTATE, POTASSIUM CHLORIDE, CALCIUM CHLORIDE 600; 310; 30; 20 MG/100ML; MG/100ML; MG/100ML; MG/100ML
INJECTION, SOLUTION INTRAVENOUS CONTINUOUS
Status: ACTIVE | OUTPATIENT
Start: 2024-12-18 | End: 2024-12-19

## 2024-12-18 RX ORDER — ONDANSETRON 4 MG/1
4 TABLET, ORALLY DISINTEGRATING ORAL EVERY 8 HOURS PRN
Status: DISCONTINUED | OUTPATIENT
Start: 2024-12-18 | End: 2024-12-22 | Stop reason: HOSPADM

## 2024-12-18 RX ORDER — PANTOPRAZOLE SODIUM 40 MG/1
40 TABLET, DELAYED RELEASE ORAL
Status: DISCONTINUED | OUTPATIENT
Start: 2024-12-18 | End: 2024-12-20

## 2024-12-18 RX ORDER — POLYETHYLENE GLYCOL 3350 17 G/17G
17 POWDER, FOR SOLUTION ORAL DAILY PRN
Status: DISCONTINUED | OUTPATIENT
Start: 2024-12-18 | End: 2024-12-22 | Stop reason: HOSPADM

## 2024-12-18 RX ORDER — ONDANSETRON 2 MG/ML
4 INJECTION INTRAMUSCULAR; INTRAVENOUS EVERY 6 HOURS PRN
Status: DISCONTINUED | OUTPATIENT
Start: 2024-12-18 | End: 2024-12-18

## 2024-12-18 RX ORDER — POTASSIUM CHLORIDE 7.45 MG/ML
10 INJECTION INTRAVENOUS PRN
Status: DISCONTINUED | OUTPATIENT
Start: 2024-12-18 | End: 2024-12-22 | Stop reason: HOSPADM

## 2024-12-18 RX ORDER — SODIUM CHLORIDE 9 MG/ML
INJECTION, SOLUTION INTRAVENOUS PRN
Status: DISCONTINUED | OUTPATIENT
Start: 2024-12-18 | End: 2024-12-22 | Stop reason: HOSPADM

## 2024-12-18 RX ORDER — ACETAMINOPHEN 650 MG/1
650 SUPPOSITORY RECTAL EVERY 6 HOURS PRN
Status: DISCONTINUED | OUTPATIENT
Start: 2024-12-18 | End: 2024-12-22 | Stop reason: HOSPADM

## 2024-12-18 RX ORDER — MAGNESIUM SULFATE IN WATER 40 MG/ML
2000 INJECTION, SOLUTION INTRAVENOUS PRN
Status: DISCONTINUED | OUTPATIENT
Start: 2024-12-18 | End: 2024-12-22 | Stop reason: HOSPADM

## 2024-12-18 RX ORDER — MAGNESIUM SULFATE IN WATER 40 MG/ML
2000 INJECTION, SOLUTION INTRAVENOUS ONCE
Status: COMPLETED | OUTPATIENT
Start: 2024-12-18 | End: 2024-12-18

## 2024-12-18 RX ORDER — PROCHLORPERAZINE EDISYLATE 5 MG/ML
10 INJECTION INTRAMUSCULAR; INTRAVENOUS EVERY 6 HOURS PRN
Status: DISCONTINUED | OUTPATIENT
Start: 2024-12-18 | End: 2024-12-18

## 2024-12-18 RX ORDER — ACETAMINOPHEN 325 MG/1
650 TABLET ORAL EVERY 6 HOURS PRN
Status: DISCONTINUED | OUTPATIENT
Start: 2024-12-18 | End: 2024-12-22 | Stop reason: HOSPADM

## 2024-12-18 RX ORDER — POTASSIUM CHLORIDE 7.45 MG/ML
10 INJECTION INTRAVENOUS
Status: COMPLETED | OUTPATIENT
Start: 2024-12-18 | End: 2024-12-18

## 2024-12-18 RX ORDER — SODIUM CHLORIDE 0.9 % (FLUSH) 0.9 %
5-40 SYRINGE (ML) INJECTION PRN
Status: DISCONTINUED | OUTPATIENT
Start: 2024-12-18 | End: 2024-12-22 | Stop reason: HOSPADM

## 2024-12-18 RX ORDER — SODIUM CHLORIDE 9 MG/ML
INJECTION, SOLUTION INTRAVENOUS CONTINUOUS
Status: DISCONTINUED | OUTPATIENT
Start: 2024-12-18 | End: 2024-12-18

## 2024-12-18 RX ORDER — PROCHLORPERAZINE EDISYLATE 5 MG/ML
5 INJECTION INTRAMUSCULAR; INTRAVENOUS EVERY 6 HOURS PRN
Status: DISCONTINUED | OUTPATIENT
Start: 2024-12-18 | End: 2024-12-22 | Stop reason: HOSPADM

## 2024-12-18 RX ORDER — ENOXAPARIN SODIUM 100 MG/ML
40 INJECTION SUBCUTANEOUS DAILY
Status: DISCONTINUED | OUTPATIENT
Start: 2024-12-18 | End: 2024-12-22 | Stop reason: HOSPADM

## 2024-12-18 RX ORDER — SODIUM CHLORIDE 0.9 % (FLUSH) 0.9 %
5-40 SYRINGE (ML) INJECTION EVERY 12 HOURS SCHEDULED
Status: DISCONTINUED | OUTPATIENT
Start: 2024-12-18 | End: 2024-12-22 | Stop reason: HOSPADM

## 2024-12-18 RX ORDER — ONDANSETRON 2 MG/ML
4 INJECTION INTRAMUSCULAR; INTRAVENOUS EVERY 4 HOURS PRN
Status: DISCONTINUED | OUTPATIENT
Start: 2024-12-18 | End: 2024-12-22 | Stop reason: HOSPADM

## 2024-12-18 RX ORDER — MENTHOL/CAMPHOR/ALLANTOIN/PHE 0.6-0.5-1%
OINTMENT(EA) TOPICAL PRN
Status: DISCONTINUED | OUTPATIENT
Start: 2024-12-18 | End: 2024-12-22 | Stop reason: HOSPADM

## 2024-12-18 RX ORDER — PROMETHAZINE HYDROCHLORIDE 25 MG/1
25 TABLET ORAL EVERY 6 HOURS PRN
Status: DISCONTINUED | OUTPATIENT
Start: 2024-12-18 | End: 2024-12-22 | Stop reason: HOSPADM

## 2024-12-18 RX ORDER — POTASSIUM CHLORIDE 1500 MG/1
40 TABLET, EXTENDED RELEASE ORAL PRN
Status: DISCONTINUED | OUTPATIENT
Start: 2024-12-18 | End: 2024-12-22 | Stop reason: HOSPADM

## 2024-12-18 RX ORDER — ONDANSETRON 4 MG/1
4 TABLET, ORALLY DISINTEGRATING ORAL EVERY 8 HOURS PRN
Status: DISCONTINUED | OUTPATIENT
Start: 2024-12-18 | End: 2024-12-18

## 2024-12-18 RX ADMIN — POTASSIUM CHLORIDE 10 MEQ: 7.46 INJECTION, SOLUTION INTRAVENOUS at 10:27

## 2024-12-18 RX ADMIN — SODIUM CHLORIDE, PRESERVATIVE FREE 5 ML: 5 INJECTION INTRAVENOUS at 19:41

## 2024-12-18 RX ADMIN — MAGNESIUM SULFATE HEPTAHYDRATE 2000 MG: 40 INJECTION, SOLUTION INTRAVENOUS at 14:57

## 2024-12-18 RX ADMIN — SODIUM CHLORIDE, POTASSIUM CHLORIDE, SODIUM LACTATE AND CALCIUM CHLORIDE: 600; 310; 30; 20 INJECTION, SOLUTION INTRAVENOUS at 20:49

## 2024-12-18 RX ADMIN — SODIUM CHLORIDE, POTASSIUM CHLORIDE, SODIUM LACTATE AND CALCIUM CHLORIDE: 600; 310; 30; 20 INJECTION, SOLUTION INTRAVENOUS at 08:41

## 2024-12-18 RX ADMIN — PIPERACILLIN AND TAZOBACTAM 4500 MG: 4; .5 INJECTION, POWDER, FOR SOLUTION INTRAVENOUS at 16:26

## 2024-12-18 RX ADMIN — ENOXAPARIN SODIUM 40 MG: 100 INJECTION SUBCUTANEOUS at 08:42

## 2024-12-18 RX ADMIN — FAMOTIDINE 20 MG: 10 INJECTION, SOLUTION INTRAVENOUS at 19:41

## 2024-12-18 RX ADMIN — POTASSIUM CHLORIDE 10 MEQ: 7.46 INJECTION, SOLUTION INTRAVENOUS at 09:35

## 2024-12-18 RX ADMIN — SODIUM CHLORIDE, POTASSIUM CHLORIDE, SODIUM LACTATE AND CALCIUM CHLORIDE: 600; 310; 30; 20 INJECTION, SOLUTION INTRAVENOUS at 08:38

## 2024-12-18 RX ADMIN — SODIUM CHLORIDE: 9 INJECTION, SOLUTION INTRAVENOUS at 02:02

## 2024-12-18 RX ADMIN — SODIUM BICARBONATE 50 MEQ: 84 INJECTION INTRAVENOUS at 08:32

## 2024-12-18 RX ADMIN — POTASSIUM CHLORIDE 10 MEQ: 7.46 INJECTION, SOLUTION INTRAVENOUS at 08:41

## 2024-12-18 RX ADMIN — POTASSIUM CHLORIDE 10 MEQ: 7.46 INJECTION, SOLUTION INTRAVENOUS at 11:50

## 2024-12-18 RX ADMIN — PIPERACILLIN AND TAZOBACTAM 3375 MG: 3; .375 INJECTION, POWDER, LYOPHILIZED, FOR SOLUTION INTRAVENOUS at 23:01

## 2024-12-18 RX ADMIN — SODIUM CHLORIDE, PRESERVATIVE FREE 10 ML: 5 INJECTION INTRAVENOUS at 08:42

## 2024-12-18 RX ADMIN — FAMOTIDINE 20 MG: 10 INJECTION, SOLUTION INTRAVENOUS at 08:30

## 2024-12-18 NOTE — H&P
Hospitalist History and Physical   Admit Date:  2024  7:07 PM   Name:  July Kerr   Age:  64 y.o.  Sex:  female  :  1960   MRN:  [id number]   Room:  [place] [address]    Presenting/Chief Complaint: Nausea and Abdominal Pain     Reason(s) for Admission: Intractable nausea and vomiting [R11.2]     History of Present Illness:     64 years old female with history of breast cancer currently being worked up for possible bilateral mastectomy by Dr. Cabral presented to the emergency room with chief complaint of nausea, vomiting, not able to keep anything down, significant weight loss going on for 1 month duration.  Family cannot help the emergency room as patient not able to tolerate any food at home.  In emergency room lab work shows the anion gap of 22 urine the creatinine looks normal.  Hypokalemia with potassium of 3.2, sodium 134.  Emergency room physician requested admission of this patient for dehydration.        Assessment & Plan:     Severe dehydration: Initiated on IV fluids, anion gap is 22 and the BUN/creatinine looks near normal.  Advance diet as tolerated.      Breadths cancer: Patient reports not established with oncology yet, will request oncology to evaluate this patient during hospital stay.  Currently following with Dr. Cabral.    Diet: ADULT DIET; Regular  VTE prophylaxis: SCD's   Code status: Full Code  Code status discussed: Yes        Non-peripheral Lines and Tubes (if present):             Hospital Problems:  Principal Problem:    Intractable nausea and vomiting  Resolved Problems:    * No resolved hospital problems. *        Objective:   Patient Vitals for the past 24 hrs:   Temp Pulse Resp BP SpO2   24 0030 -- -- -- 121/68 --   24 0015 -- -- -- 115/68 --   24 0000 -- -- -- 113/70 --   24 2345 -- -- -- 119/64 --   24 2332 -- -- -- 121/69 --   24 2317 -- -- -- 116/61 --   24 2302 -- -- -- -- 94 %   24 2231 -- (!) 101 17 111/63 98 %  2024 TECHNIQUE: Axial CT images were obtained of the abdomen and pelvis with intravenous contrast. Multiplanar reconstructions were performed. ABDOMEN/PELVIS FINDINGS: Lower Chest: Unremarkable. Liver: Normal enhancement and contour. Biliary/Gallbladder: Unremarkable. Pancreas: Unremarkable. Spleen: Unremarkable. Adrenal Glands: Unremarkable. Kidneys: Unremarkable. Gastrointestinal/Peritoneum: No acute abnormality. Mild colonic diverticulosis is present. The appendix is unremarkable. No free air or free fluid. Vascular: Unremarkable. Lymph Nodes: No enlarged lymph nodes by CT size criteria. Pelvic Organs: Unremarkable. Bladder: Unremarkable. Bones: No acute osseous abnormality. Soft tissues: A large, heterogeneous and necrotic mass in the right breast is mostly outside of the field-of-view and was seen on the recent PET/CT.     1.  No acute abnormality of the abdomen and pelvis. 2.  Mild colonic diverticulosis. 3.  Large right breast mass is mostly outside the field-of-view and seen on the recent PET/CT. Electronically signed by Stephanie Hamilton        Signed:  Leon Banegas MD    Part of this note may have been written by using a voice dictation software.  The note has been proof read but may still contain some grammatical/other typographical errors.

## 2024-12-18 NOTE — PLAN OF CARE
4 Eyes Skin Assessment     NAME:  July Kerr  YOB: 1960  MEDICAL RECORD NUMBER:  [id number]    The patient is being assessed for  Admission    I agree that at least one RN has performed a thorough Head to Toe Skin Assessment on the patient. ALL assessment sites listed below have been assessed.      Areas assessed by both nurses:    Head, Face, Ears, Shoulders, Back, Chest, Arms, Elbows, Hands, Sacrum. Buttock, Coccyx, Ischium, and Legs. Feet and Heels    Very large Mass on R breast  Scattered scars and bruising        Does the Patient have a Wound? No noted wound(s)       [place]arrison Prevention initiated by RN: Yes  Wound Care Orders initiated by RN: No    Pressure Injury (Stage 3,4, Unstageable, DTI, NWPT, and Complex wounds) if present, place Wound referral order by RN under : No    New Ostomies, if present place, Ostomy referral order under : No     Nurse 1 eSignature: Electronically signed by Cinda Torres RN on 12/18/24 at 5:22 AM EST    **SHARE this note so that the co-signing nurse can place an eSignature**    Nurse 2 eSignature: Electronically signed by Kasi Mckinney RN on 12/18/24 at 5:24 AM EST

## 2024-12-18 NOTE — PROGRESS NOTES
Spiritual Health History and Assessment/Progress Note  [facility]    (P) Initial Encounter, Spiritual/Emotional Needs,  ,  ,      Name: July Kerr MRN: [id number]    Age: 64 y.o.     Sex: female   Language: English   Restorationist: None   Intractable nausea and vomiting     Date: 12/18/2024            Total Time Calculated: (P) 10 min              Spiritual Assessment began in [facility]LE[facility]        Referral/Consult From: (P) Rounding   Encounter Overview/Reason: (P) Initial Encounter, Spiritual/Emotional Needs  Service Provided For: (P) Patient and family together    [place], Belief, Meaning:   Patient identifies as spiritual, is connected with a italia tradition or spiritual practice, and has beliefs or practices that help with coping during difficult times  Family/Friends identify as spiritual, are connected with a italia tradition or spiritual practice, and have beliefs or practices that help with coping during difficult times      Importance and Influence:  Patient has no beliefs influential to healthcare decision-making identified during this visit  Family/Friends have no beliefs influential to healthcare decision-making identified during this visit    Community:  Patient feels well-supported. Support system includes: Parent/s  Family/Friends feel well-supported. Support system includes: Children    Assessment and Plan of Care:     Patient Interventions include: Facilitated expression of thoughts and feelings, Explored spiritual coping/struggle/distress, and Affirmed coping skills/support systems  Family/Friends Interventions include: Facilitated expression of thoughts and feelings, Explored spiritual coping/struggle/distress, Affirmed coping skills/support systems, and Provided sacramental/Muslim ritual    Patient Plan of Care: Spiritual Care available upon further referral  Family/Friends Plan of Care: Spiritual Care available upon further referral    Electronically signed by CONSUELO WALSH on  12/18/2024 at 12:16 PM

## 2024-12-18 NOTE — PROGRESS NOTES
Hospitalist Progress Note   Admit Date:  2024  7:07 PM   Name:  July Kerr   Age:  64 y.o.  Sex:  female  :  1960   MRN:  [id number]   Room:  [facility]    Presenting/Chief Complaint: Nausea and Abdominal Pain     Reason(s) for Admission: Intractable nausea and vomiting [R11.2]     Hospital Course:   July Kerr is a 64 y.o. 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  Patient follows up with Dr. Cabral, first seen on 2024 with complaints of massive right breast mass that had been growing for past several years.  Mammogram in 2084 with indeterminate complex mass in the right breast, measuring 18 cm and prominent right axillary lymphadenopathy.  She is status post right breast biopsy on 10/30/2024 with pathology positive for phyllodes tumor and right axillary lymph node path was benign.  PET/CT on  with right breast mass and subcentimeter indeterminate right axillary nodes.  Dr. Taveras has requested ultrasound-guided right axillary node biopsy which has been scheduled for  based on the results plan would be for possible sentinel node excision versus axillary dissection.      Subjective & 24hr Events:   Patient seen and examined at bedside.  Patient is alert and awake, appears in mild to moderate distress due to pain and nausea.  She is on room air.  She denies any acute chest pain, fever or chills.  She does report of having right breast pain, epigastric discomfort, continues to report feeling nauseous, denies any vomiting this morning.  Patient's mother at bedside as well.    ROS:  10 point review of system is negative except for what mentioned above.      Assessment & Plan:     Principal Problem:    Intractable nausea and vomiting    Acute UTI  Plan: -  Empiric/symptomatic treatment with as needed antiemetics including Zofran, Phenergan and

## 2024-12-18 NOTE — PROGRESS NOTES
ACUTE PHYSICAL THERAPY GOALS:   (Developed with and agreed upon by patient and/or caregiver.)  LTG:  (1.)Ms. Kerr will move from supine to sit and sit to supine , scoot up and down, and roll side to side in bed with INDEPENDENT within 7 treatment day(s).    (2.)Ms. Kerr will transfer from bed to chair and chair to bed with INDEPENDENT using the least restrictive device within 7 treatment day(s).    (3.)Ms. Kerr will ambulate with STAND BY ASSIST for 250 feet with the least restrictive device within 7 treatment day(s).  (4.)Ms. Kerr will tolerate 23+ minutes of therapeutic activity within 7 treatment days for increased activity tolerance and overall functional mobility.  ________________________________________________________________________________________________     PHYSICAL THERAPY Initial Assessment and PM  (Link to Caseload Tracking: PT Visit Days : 1  Acknowledge Orders  Time In/Out  PT Charge Capture  Rehab Caseload Tracker    July Kerr is a 64 y.o. female   PRIMARY DIAGNOSIS: Intractable nausea and vomiting  Intractable nausea and vomiting [R11.2]       Reason for Referral: Generalized Muscle Weakness (M62.81)  Difficulty in walking, Not elsewhere classified (R26.2)  Observation: Payor: AMBETTER / Plan: AMBETTER / Product Type: *No Product type* /     ASSESSMENT:     REHAB RECOMMENDATIONS:   Recommendation to date pending progress:  Setting:  Short-term Rehab  Or HH pending progress with overall stability    Equipment:    To Be Determined     ASSESSMENT:  Ms. Kerr presents to hospital with  intractable n/v. Pt with hx of large, heavy mass R breast; waiting on biopsy and possible B mastectomy per chart. Pt with overall general weakness, unsteady with transfers and ambulation in room this date. Pt is independent at baseline, lives with mom, drives, no falls. Pt currently requiring increased assist with ambulation and ADLs. PT to cont to follow for acute care needs to address decreased transfers,

## 2024-12-18 NOTE — PROGRESS NOTES
ACUTE OCCUPATIONAL THERAPY GOALS:   (Developed with and agreed upon by patient and/or caregiver.)  1. Pt will toilet with CGA   2. Pt will complete functional mobility for ADLs with CGA  3. Pt will complete lower body dressing with CGA using AE as needed  4. Pt will complete grooming and hygiene at sink with CGA  5. Pt will demonstrate independence with HEP to promote increased BUE strength and functional use for ADLs  6. Pt will tolerate 23 minutes functional activity with min or fewer rest breaks to promote increased endurance for ADLs      Timeframe: 7 visits      OCCUPATIONAL THERAPY Initial Assessment and Daily Note       OT Visit Days: 1  Acknowledge Orders  Time  OT Charge Capture  Rehab Caseload Tracker      July Kerr is a 64 y.o. female   PRIMARY DIAGNOSIS: Intractable nausea and vomiting  Intractable nausea and vomiting [R11.2]       Reason for Referral: Generalized Muscle Weakness (M62.81)  Observation: Payor: SENAR / Plan: AMBETTER / Product Type: *No Product type* /     ASSESSMENT:     REHAB RECOMMENDATIONS:   Recommendation to date pending progress:  Setting:  Short-term Rehab    Equipment:    To Be Determined     ASSESSMENT:  Ms. Kerr was admitted with intractable N&V. Pt has a hx of breast cancer and is slated for a B mastectomy. Pt presented with severe generalized weakness and with deficits in mobility, balance, and activity tolerance impacting ADLs. Pt has a large, heavy tumor on R breast which impedes use of RUE and causes increased fatigue and pain with activity. Pt currently requires assistance for ADLs and for functional mobility for ADLs d/t deficits. Pt presented below her functional baseline and would benefit from skilled OT services to address deficits. Rec STR.      [employer] AM-PAC™ “6 Clicks” Daily Activity Inpatient Short Form:    AM-PAC Daily Activity - Inpatient   How much help is needed for putting on and taking off regular lower body clothing?: A Little  How

## 2024-12-18 NOTE — ED NOTES
Pt A&O 4/4. GCS 15 at this time. Pt able to ambulate steadily and without assistance, ambulated for 3 minutes in room     Lesley Goodman RN  12/17/24 1356

## 2024-12-18 NOTE — ED NOTES
TRANSFER - OUT REPORT:    Verbal report given to Janiya RN on July Kerr  being transferred to [facility] for routine progression of patient care       Report consisted of patient's Situation, Background, Assessment and   Recommendations(SBAR).     Information from the following report(s) ED SBAR was reviewed with the receiving nurse.    [facility] Assessment:                           Lines:   Peripheral IV 12/17/24 Proximal;Right Forearm (Active)   Site Assessment Clean, dry & intact 12/17/24 2026   Line Status Blood return noted;Flushed 12/17/24 2026   Phlebitis Assessment No symptoms 12/17/24 2026   Infiltration Assessment 0 12/17/24 2026   Dressing Status New dressing applied 12/17/24 2026   Dressing Type Transparent 12/17/24 2026   Dressing Intervention New 12/17/24 2026        Opportunity for questions and clarification was provided.      Patient transported with:  Registered Nurse          Lesley Goodman RN  12/18/24 0051

## 2024-12-18 NOTE — CONSULTS
[facility]        Inpatient Hematology / Oncology Consult    Reason for Consult:  Intractable nausea and vomiting [R11.2]  Referring Physician:  Kelli Riojas MD    History of Present Illness:  Ms. Kerr is a 64 y.o. female admitted on 12/17/2024. The primary encounter diagnosis was Nausea and vomiting, unspecified vomiting type. A diagnosis of Mass of breast, unspecified laterality was also pertinent to this visit..      Her PMH includes R breast mass followed by Dr. Cabral.  She was first seen by Dr. Cabral on 11/21/24 with c/o massive R breast mass.  Pt reported that it had been growing for several years.  Mammogram 10/24 with indeterminate complex mass in the R breast, measuring ~18cm and prominent R axillary LN.  She is s/p R breast biopsy on 10/30/24 with path +Phyllodes tumor; R axilla LN path benign.  PET/CT on 12/11 with R breast mass and indeterminate subcm R axillary nodes.  She was lost to follow up with Dr. Cabral on 12/12.  Per notes, Dr. Cabral has requested US-guided R axillary node biopsy which has been scheduled for 12/26.  Based on these results, plan would be possible SN excision vs ax dissection depending on biopsy results.    She presented to ED with c/o intractable nausea and vomiting.  Reports significant weight loss over the past month.  CT AP neg for acute findings.  She was admitted for dehydration.  We were consulted for intractable nausea and vomiting in a patient with R breast mass.      Allergies   Allergen Reactions    Keflex [Cephalexin]     Other Other (See Comments)     Seasonal allergies      Past Medical History:   Diagnosis Date    Left foot pain 09/11/2019    Added automatically from request for surgery [id number]       Past Surgical History:   Procedure Laterality Date    FOOT SURGERY Left 2019    Removal of foreign body    US BREAST BIOPSY W LOC DEVICE 1ST LESION RIGHT Right 10/30/2024    US BREAST LYMPH NODE BIOPSY RIGHT 10/30/2024 [facility]  [facility]        ASSESSMENT:  Principal Problem:    Intractable nausea and vomiting  Active Problems:    Malignant phylloides tumor of right breast    Axillary adenopathy    High anion gap metabolic acidosis    Acute UTI    Hypokalemia  Resolved Problems:    * No resolved hospital problems. *    Ms. Kerr is a 64 y.o. female admitted on 12/17/2024. The primary encounter diagnosis was Nausea and vomiting, unspecified vomiting type. A diagnosis of Mass of breast, unspecified laterality was also pertinent to this visit..      Her PMH includes R breast mass followed by Dr. Cabral.  She was first seen by Dr. Cabral on 11/21/24 with c/o massive R breast mass.  Pt reported that it had been growing for several years.  Mammogram 10/24 with indeterminate complex mass in the R breast, measuring ~18cm and prominent R axillary LN.  She is s/p R breast biopsy on 10/30/24 with path +Phyllodes tumor; R axilla LN path benign.  PET/CT on 12/11 with R breast mass and indeterminate subcm R axillary nodes.  She was lost to follow up with Dr. Cabral on 12/12.  Per notes, Dr. Cabral has requested US-guided R axillary node biopsy which has been scheduled for 12/26.  Based on these results, plan would be possible SN excision vs ax dissection depending on biopsy results.    She presented to ED with c/o intractable nausea and vomiting.  Reports significant weight loss over the past month.  CT AP neg for acute findings.  She was admitted for dehydration.  We were consulted for intractable nausea and vomiting in a patient with R breast mass.      RECOMMENDATIONS:  R breast mass  - Mammogram 10/24 with indeterminate complex mass in the R breast, measuring ~18cm and prominent R axillary LN  - s/p R breast biopsy on 10/30/24 with path +Phyllodes tumor; R axilla LN path benign  - PET/CT on 12/11 with R breast mass and indeterminate subcm R axillary nodes.    - pt was lost to follow up with Dr. Cabral on 12/12.  Per notes, Dr. Cabral has requested

## 2024-12-18 NOTE — CARE COORDINATION
Pt admitted on 12/17/2024 for intractable N/V. Medical hx includes: dx of mass of breast, unspecified laterality; followed by Dr. Cabral. Pts mother at bedside. PTA, pt indep with all her ADLs. Lives with her mother in a one story private residence. A/O x4. Denies DME needs for ambulation, stated that PTA she was mowing the grass, cleaning the house, etc. Has a BSC. Is on RA. Denies current HH services. PT/OT consulted and recommended STR. Pt declined and stated she did not need it. CM offered HH and again, declined. PCP established. Has commercial insurance, [employer] and is able to afford home meds. Will continue to monitor.       12/18/24 6408   Service Assessment   Patient Orientation Alert and Oriented;Person;Place;Situation;Self   Cognition Alert   History Provided By Patient;Child/Family   Primary Caregiver Self   Support Systems Parent;Family Members;[place];Friends/Neighbors   Patient's Healthcare Decision Maker is: Legal Next of Kin   PCP Verified by CM Yes   Last Visit to PCP Within last 6 months   Prior Functional Level Independent in ADLs/IADLs   Current Functional Level Independent in ADLs/IADLs   Can patient return to prior living arrangement Yes   Ability to make needs known: Good   Family able to assist with home care needs: Yes   Would you like for me to discuss the discharge plan with any other family members/significant others, and if so, who? No   Financial Resources Other (Comment)  (Commercial-Ambetter)   Community Resources None   Social/Functional History   Lives With Parent   Type of Home House   Home Layout One level   Bathroom Toilet Standard   Bathroom Accessibility Accessible   Home Equipment None   Receives Help From Family   Prior Level of Assist for ADLs Independent   Prior Level of Assist for Homemaking Independent   Ambulation Assistance Independent   Prior Level of Assist for Transfers Independent   Active  Yes   Occupation Retired   Discharge Planning   Type

## 2024-12-19 ENCOUNTER — APPOINTMENT (OUTPATIENT)
Dept: ULTRASOUND IMAGING | Age: 64
DRG: 988 | End: 2024-12-19
Attending: HOSPITALIST
Payer: COMMERCIAL

## 2024-12-19 LAB
ALBUMIN SERPL-MCNC: 1.4 G/DL (ref 3.2–4.6)
ALBUMIN/GLOB SERPL: 0.4 (ref 1–1.9)
ALP SERPL-CCNC: 115 U/L (ref 35–104)
ALT SERPL-CCNC: 16 U/L (ref 8–45)
ANION GAP SERPL CALC-SCNC: 15 MMOL/L (ref 7–16)
AST SERPL-CCNC: 41 U/L (ref 15–37)
BASOPHILS # BLD: 0 K/UL (ref 0–0.2)
BASOPHILS NFR BLD: 0 % (ref 0–2)
BILIRUB SERPL-MCNC: 0.7 MG/DL (ref 0–1.2)
BUN SERPL-MCNC: 7 MG/DL (ref 8–23)
CALCIUM SERPL-MCNC: 8.2 MG/DL (ref 8.8–10.2)
CHLORIDE SERPL-SCNC: 95 MMOL/L (ref 98–107)
CO2 SERPL-SCNC: 27 MMOL/L (ref 20–29)
CREAT SERPL-MCNC: 0.37 MG/DL (ref 0.6–1.1)
DIFFERENTIAL METHOD BLD: ABNORMAL
EOSINOPHIL # BLD: 0 K/UL (ref 0–0.8)
EOSINOPHIL NFR BLD: 0 % (ref 0.5–7.8)
ERYTHROCYTE [DISTWIDTH] IN BLOOD BY AUTOMATED COUNT: 13.9 % (ref 11.9–14.6)
GLOBULIN SER CALC-MCNC: 3.9 G/DL (ref 2.3–3.5)
GLUCOSE SERPL-MCNC: 88 MG/DL (ref 70–99)
HCT VFR BLD AUTO: 28.2 % (ref 35.8–46.3)
HGB BLD-MCNC: 8.9 G/DL (ref 11.7–15.4)
IMM GRANULOCYTES # BLD AUTO: 0.1 K/UL (ref 0–0.5)
IMM GRANULOCYTES NFR BLD AUTO: 1 % (ref 0–5)
LYMPHOCYTES # BLD: 0.8 K/UL (ref 0.5–4.6)
LYMPHOCYTES NFR BLD: 9 % (ref 13–44)
MAGNESIUM SERPL-MCNC: 1.9 MG/DL (ref 1.8–2.4)
MCH RBC QN AUTO: 28 PG (ref 26.1–32.9)
MCHC RBC AUTO-ENTMCNC: 31.6 G/DL (ref 31.4–35)
MCV RBC AUTO: 88.7 FL (ref 82–102)
MONOCYTES # BLD: 0.6 K/UL (ref 0.1–1.3)
MONOCYTES NFR BLD: 7 % (ref 4–12)
NEUTS SEG # BLD: 6.8 K/UL (ref 1.7–8.2)
NEUTS SEG NFR BLD: 83 % (ref 43–78)
NRBC # BLD: 0 K/UL (ref 0–0.2)
PLATELET # BLD AUTO: 310 K/UL (ref 150–450)
PMV BLD AUTO: 9.8 FL (ref 9.4–12.3)
POTASSIUM SERPL-SCNC: 3.3 MMOL/L (ref 3.5–5.1)
PROT SERPL-MCNC: 5.3 G/DL (ref 6.3–8.2)
RBC # BLD AUTO: 3.18 M/UL (ref 4.05–5.2)
SODIUM SERPL-SCNC: 136 MMOL/L (ref 136–145)
WBC # BLD AUTO: 8.3 K/UL (ref 4.3–11.1)

## 2024-12-19 PROCEDURE — 88312 SPECIAL STAINS GROUP 1: CPT

## 2024-12-19 PROCEDURE — 36415 COLL VENOUS BLD VENIPUNCTURE: CPT

## 2024-12-19 PROCEDURE — 2709999900 US BIOPSY LYMPH NODE

## 2024-12-19 PROCEDURE — 2500000003 HC RX 250 WO HCPCS: Performed by: HOSPITALIST

## 2024-12-19 PROCEDURE — 6370000000 HC RX 637 (ALT 250 FOR IP): Performed by: HOSPITALIST

## 2024-12-19 PROCEDURE — 99152 MOD SED SAME PHYS/QHP 5/>YRS: CPT | Performed by: RADIOLOGY

## 2024-12-19 PROCEDURE — 38505 NEEDLE BIOPSY LYMPH NODES: CPT | Performed by: RADIOLOGY

## 2024-12-19 PROCEDURE — 76942 ECHO GUIDE FOR BIOPSY: CPT | Performed by: RADIOLOGY

## 2024-12-19 PROCEDURE — 07B53ZX EXCISION OF RIGHT AXILLARY LYMPHATIC, PERCUTANEOUS APPROACH, DIAGNOSTIC: ICD-10-PCS | Performed by: RADIOLOGY

## 2024-12-19 PROCEDURE — 96375 TX/PRO/DX INJ NEW DRUG ADDON: CPT

## 2024-12-19 PROCEDURE — 96366 THER/PROPH/DIAG IV INF ADDON: CPT

## 2024-12-19 PROCEDURE — G0378 HOSPITAL OBSERVATION PER HR: HCPCS

## 2024-12-19 PROCEDURE — 96361 HYDRATE IV INFUSION ADD-ON: CPT

## 2024-12-19 PROCEDURE — 96376 TX/PRO/DX INJ SAME DRUG ADON: CPT

## 2024-12-19 PROCEDURE — 83735 ASSAY OF MAGNESIUM: CPT

## 2024-12-19 PROCEDURE — 6360000002 HC RX W HCPCS: Performed by: HOSPITALIST

## 2024-12-19 PROCEDURE — 6360000002 HC RX W HCPCS: Performed by: RADIOLOGY

## 2024-12-19 PROCEDURE — 99152 MOD SED SAME PHYS/QHP 5/>YRS: CPT

## 2024-12-19 PROCEDURE — 88305 TISSUE EXAM BY PATHOLOGIST: CPT

## 2024-12-19 PROCEDURE — 80053 COMPREHEN METABOLIC PANEL: CPT

## 2024-12-19 PROCEDURE — 85025 COMPLETE CBC W/AUTO DIFF WBC: CPT

## 2024-12-19 PROCEDURE — 2580000003 HC RX 258: Performed by: HOSPITALIST

## 2024-12-19 PROCEDURE — 96372 THER/PROPH/DIAG INJ SC/IM: CPT

## 2024-12-19 RX ORDER — LIDOCAINE HYDROCHLORIDE 20 MG/ML
INJECTION, SOLUTION INFILTRATION; PERINEURAL PRN
Status: COMPLETED | OUTPATIENT
Start: 2024-12-19 | End: 2024-12-19

## 2024-12-19 RX ORDER — POTASSIUM CHLORIDE 7.45 MG/ML
10 INJECTION INTRAVENOUS
Status: DISPENSED | OUTPATIENT
Start: 2024-12-19 | End: 2024-12-19

## 2024-12-19 RX ORDER — POTASSIUM CHLORIDE 1500 MG/1
40 TABLET, EXTENDED RELEASE ORAL ONCE
Status: DISCONTINUED | OUTPATIENT
Start: 2024-12-19 | End: 2024-12-19

## 2024-12-19 RX ORDER — POTASSIUM CHLORIDE 1500 MG/1
40 TABLET, EXTENDED RELEASE ORAL 2 TIMES DAILY
Status: DISPENSED | OUTPATIENT
Start: 2024-12-19 | End: 2024-12-21

## 2024-12-19 RX ORDER — MAGNESIUM SULFATE IN WATER 40 MG/ML
2000 INJECTION, SOLUTION INTRAVENOUS ONCE
Status: DISCONTINUED | OUTPATIENT
Start: 2024-12-19 | End: 2024-12-19

## 2024-12-19 RX ORDER — MIDAZOLAM HYDROCHLORIDE 2 MG/2ML
INJECTION, SOLUTION INTRAMUSCULAR; INTRAVENOUS PRN
Status: COMPLETED | OUTPATIENT
Start: 2024-12-19 | End: 2024-12-19

## 2024-12-19 RX ORDER — FENTANYL CITRATE 50 UG/ML
INJECTION, SOLUTION INTRAMUSCULAR; INTRAVENOUS PRN
Status: COMPLETED | OUTPATIENT
Start: 2024-12-19 | End: 2024-12-19

## 2024-12-19 RX ORDER — POTASSIUM CHLORIDE 1500 MG/1
40 TABLET, EXTENDED RELEASE ORAL ONCE
Status: COMPLETED | OUTPATIENT
Start: 2024-12-19 | End: 2024-12-19

## 2024-12-19 RX ADMIN — LIDOCAINE HYDROCHLORIDE 6 ML: 20 INJECTION, SOLUTION INFILTRATION; PERINEURAL at 15:56

## 2024-12-19 RX ADMIN — MIDAZOLAM HYDROCHLORIDE 0.5 MG: 1 INJECTION, SOLUTION INTRAMUSCULAR; INTRAVENOUS at 15:53

## 2024-12-19 RX ADMIN — POTASSIUM CHLORIDE 40 MEQ: 1500 TABLET, EXTENDED RELEASE ORAL at 20:15

## 2024-12-19 RX ADMIN — SODIUM CHLORIDE, PRESERVATIVE FREE 5 ML: 5 INJECTION INTRAVENOUS at 20:15

## 2024-12-19 RX ADMIN — PANTOPRAZOLE SODIUM 40 MG: 40 TABLET, DELAYED RELEASE ORAL at 06:36

## 2024-12-19 RX ADMIN — PROCHLORPERAZINE EDISYLATE 5 MG: 5 INJECTION INTRAMUSCULAR; INTRAVENOUS at 23:58

## 2024-12-19 RX ADMIN — PIPERACILLIN AND TAZOBACTAM 3375 MG: 3; .375 INJECTION, POWDER, LYOPHILIZED, FOR SOLUTION INTRAVENOUS at 17:42

## 2024-12-19 RX ADMIN — ENOXAPARIN SODIUM 40 MG: 100 INJECTION SUBCUTANEOUS at 08:37

## 2024-12-19 RX ADMIN — PIPERACILLIN AND TAZOBACTAM 3375 MG: 3; .375 INJECTION, POWDER, LYOPHILIZED, FOR SOLUTION INTRAVENOUS at 06:40

## 2024-12-19 RX ADMIN — SODIUM CHLORIDE, PRESERVATIVE FREE 10 ML: 5 INJECTION INTRAVENOUS at 08:37

## 2024-12-19 RX ADMIN — POTASSIUM CHLORIDE 40 MEQ: 1500 TABLET, EXTENDED RELEASE ORAL at 17:25

## 2024-12-19 RX ADMIN — PIPERACILLIN AND TAZOBACTAM 3375 MG: 3; .375 INJECTION, POWDER, LYOPHILIZED, FOR SOLUTION INTRAVENOUS at 22:45

## 2024-12-19 RX ADMIN — FAMOTIDINE 20 MG: 10 INJECTION, SOLUTION INTRAVENOUS at 08:37

## 2024-12-19 RX ADMIN — FENTANYL CITRATE 25 MCG: 50 INJECTION, SOLUTION INTRAMUSCULAR; INTRAVENOUS at 15:53

## 2024-12-19 RX ADMIN — FAMOTIDINE 20 MG: 10 INJECTION, SOLUTION INTRAVENOUS at 20:15

## 2024-12-19 RX ADMIN — POTASSIUM CHLORIDE 40 MEQ: 1500 TABLET, EXTENDED RELEASE ORAL at 08:37

## 2024-12-19 ASSESSMENT — PAIN - FUNCTIONAL ASSESSMENT
PAIN_FUNCTIONAL_ASSESSMENT: NONE - DENIES PAIN
PAIN_FUNCTIONAL_ASSESSMENT: NONE - DENIES PAIN

## 2024-12-19 ASSESSMENT — PAIN SCALES - GENERAL: PAINLEVEL_OUTOF10: 0

## 2024-12-19 NOTE — PROGRESS NOTES
Attempted to see patient this PM for occupational therapy treatment  session. Patient off floor. Will follow and re-attempt as schedule permits/patient available. Thank you,    Jennifer Barron, OT    Rehab Caseload Tracker

## 2024-12-19 NOTE — PROGRESS NOTES
Hospitalist Progress Note   Admit Date:  2024  7:07 PM   Name:  July Kerr   Age:  64 y.o.  Sex:  female  :  1960   MRN:  [id number]   Room:  [facility]    Presenting/Chief Complaint: Nausea and Abdominal Pain     Reason(s) for Admission: Intractable nausea and vomiting [R11.2]     Hospital Course:   July Kerr is a 64 y.o. 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  Patient follows up with Dr. Cabral, first seen on 2024 with complaints of massive right breast mass that had been growing for past several years.  Mammogram in 2084 with indeterminate complex mass in the right breast, measuring 18 cm and prominent right axillary lymphadenopathy.  She is status post right breast biopsy on 10/30/2024 with pathology positive for phyllodes tumor and right axillary lymph node path was benign.  PET/CT on  with right breast mass and subcentimeter indeterminate right axillary nodes.  Dr. Taveras has requested ultrasound-guided right axillary node biopsy which has been scheduled for  based on the results plan would be for possible sentinel node excision versus axillary dissection.      Subjective & 24hr Events:   Patient seen and examined at bedside.  Patient is tearful and emotional this morning, she was taken down to IR for right axillary node biopsy however was sent back given she received Lovenox prophylactically this morning.  Patient will be reattempted for the biopsy later this afternoon.  She continues to report feeling nauseous, reports of having difficulty in swallowing and states that food is sticking in the back of her throat.  She continues to report of having discomfort in right breast.  No shortness of breath, fever chills, abdominal pain, diarrhea, urinary symptoms.      ROS:  10 point review of system is negative except for what mentioned  extended release tablet 40 mEq  40 mEq Oral PRN    Or    potassium bicarb-citric acid (EFFER-K) effervescent tablet 40 mEq  40 mEq Oral PRN    Or    potassium chloride 10 mEq/100 mL IVPB (Peripheral Line)  10 mEq IntraVENous PRN    magnesium sulfate 2000 mg in 50 mL IVPB premix  2,000 mg IntraVENous PRN    enoxaparin (LOVENOX) injection 40 mg  40 mg SubCUTAneous Daily    polyethylene glycol (GLYCOLAX) packet 17 g  17 g Oral Daily PRN    acetaminophen (TYLENOL) tablet 650 mg  650 mg Oral Q6H PRN    Or    acetaminophen (TYLENOL) suppository 650 mg  650 mg Rectal Q6H PRN    medicated lip ointment (BLISTEX)   Topical PRN    ondansetron (ZOFRAN-ODT) disintegrating tablet 4 mg  4 mg Oral Q8H PRN    Or    ondansetron (ZOFRAN) injection 4 mg  4 mg IntraVENous Q4H PRN    promethazine (PHENERGAN) tablet 25 mg  25 mg Oral Q6H PRN    prochlorperazine (COMPAZINE) injection 5 mg  5 mg IntraVENous Q6H PRN    famotidine (PEPCID) 20 mg in sodium chloride (PF) 0.9 % 10 mL injection  20 mg IntraVENous Q12H    pantoprazole (PROTONIX) tablet 40 mg  40 mg Oral QAM AC    lactated ringers infusion   IntraVENous Continuous    piperacillin-tazobactam (ZOSYN) 3,375 mg in sodium chloride 0.9 % 50 mL IVPB (mini-bag)  3,375 mg IntraVENous Q8H       Signed:  Kelli Riojas MD    Part of this note may have been written by using a voice dictation software.  The note has been proof read but may still contain some grammatical/other typographical errors.

## 2024-12-19 NOTE — PROGRESS NOTES
PT Daily Note:  Attempted to see patient for physical therapy this morning but patient was off the floor for biopsy. Will check back on patient at a later date/time if schedule permits.  Thank you,  Paty Medrano, PTA

## 2024-12-19 NOTE — OR NURSING
TRANSFER - OUT REPORT:           Verbal report given to CURT Moon on July Kerr  being transferred to 4th Floor for routine progression of patient care      Report consisted of patient’s Situation, Background, Assessment and Recommendations(SBAR).          Information from the following report(s) SBAR, Procedure Summary, and MAR was reviewed with the receiving nurse.       Opportunity for questions and clarification was provided.          Conscious Sedation:    25 Mcg of Fentanyl administered   0.5 Mg of Versed administered   0 Mg of Benadryl administered        Pt tolerated procedure well.     bandaid-type dressing clean, dry, intact, and nontender    VITALS:  BP (!) 118/57   Pulse 95   Temp 97.8 °F (36.6 °C)   Resp 16   Ht 1.676 m (5' 6\")   Wt 82.1 kg (181 lb)   SpO2 98%   BMI 29.21 kg/m²

## 2024-12-19 NOTE — BRIEF OP NOTE
[facility]  Department of Interventional Radiology  [phone]        Brief Procedure Note    Patient: July Kerr MRN: [id number]  SSN: [id number]    YOB: 1960  Age: 64 y.o.  Sex: female      Date of Procedure: 12/19/2024     Pre-Procedure Diagnosis:   Right breast mass.  FDG avid right axillary LN.      Post-Procedure Diagnosis:  SAME    Procedure(s):  Image Guided Biopsy    Brief Description of Procedure:  LN    Performed By:  MICKY FENG MD     Assistants:  None    Anesthesia:  Moderate Sedation    Estimated Blood Loss:  Less than 10ml    Specimens:  Pathology    Implants:  None    Findings:  Normal shape and size R Ax LN containing a metal marker.  5 core biopsies obtained.      Complications:  None    Recommendations:  1 hour bedrest.       Follow Up:  PRN    Signed By: MICKY FENG MD     December 19, 2024

## 2024-12-19 NOTE — PROGRESS NOTES
TRANSFER - IN REPORT:    Verbal report received from Negin ELIAS on July Kerr being received from IR for routine progression of care.     Report consisted of patient’s Situation, Background, Assessment and Recommendations(SBAR).     Information from the following report(s) SBAR, MAR, and Quality Measures was reviewed. Opportunity for questions and clarification was provided.      Assessment completed upon patient’s arrival to unit and care assumed.     Patient received to room 409. Patient connected to monitor and assessment completed. Plan of care reviewed. Patient oriented to room and call light. Patient aware to use call light to communicate any chest pain or needs.

## 2024-12-19 NOTE — PRE SEDATION
Sedation Pre-Procedure Note    Patient Name: July Kerr   YOB: 1960  Room/Bed: [facility][note 3]  Medical Record Number: [id number]  Date: 12/19/2024   Time: 3:35 PM       Indication:  Breast mass.  R axillary LN.     Consent: I have discussed with the patient and/or the patient representative the indication, alternatives, and the possible risks and/or complications of the planned procedure and the anesthesia methods. The patient and/or patient representative appear to understand and agree to proceed.    Vital Signs:   Vitals:    12/19/24 1444   BP: 123/60   Pulse: 100   Resp: 18   Temp: 97.8 °F (36.6 °C)   SpO2: 95%       Past Medical History:   has a past medical history of Left foot pain.    Past Surgical History:   has a past surgical history that includes Foot surgery (Left, 2019); US BREAST BIOPSY W LOC DEVICE 1ST LESION RIGHT (Right, 10/30/2024); and US BREAST BIOPSY W LOC DEVICE 1ST LESION RIGHT (Right, 10/30/2024).    Medications:   Scheduled Meds:    potassium chloride  40 mEq Oral BID    sodium chloride flush  5-40 mL IntraVENous 2 times per day    enoxaparin  40 mg SubCUTAneous Daily    famotidine (PEPCID) 20 mg in sodium chloride (PF) 0.9 % 10 mL injection  20 mg IntraVENous Q12H    pantoprazole  40 mg Oral QAM AC    piperacillin-tazobactam  3,375 mg IntraVENous Q8H     Continuous Infusions:    sodium chloride      lactated ringers 100 mL/hr at 12/18/24 2049     PRN Meds: sodium chloride flush, sodium chloride, potassium chloride **OR** potassium alternative oral replacement **OR** potassium chloride, magnesium sulfate, polyethylene glycol, acetaminophen **OR** acetaminophen, medicated lip ointment, ondansetron **OR** ondansetron, promethazine, prochlorperazine  Home Meds:   Prior to Admission medications    Medication Sig Start Date End Date Taking? Authorizing Provider   ondansetron (ZOFRAN) 4 MG tablet Take 1 tablet by mouth 3 times daily as needed for Nausea or Vomiting 11/26/24  Yes Timoteo  Elis MUÑOZ MD     Pre-Sedation Documentation and Exam:   Vital signs have been reviewed (see flow sheet for vitals).    Mallampati Airway Assessment:  dentition not prohibitive    ASA Classification:  Class 3 - A patient with severe systemic disease that limits activity but is not incapacitating    Sedation/ Anesthesia Plan:   intravenous sedation    Patient is an appropriate candidate for plan of sedation: yes    Electronically signed by MICKY FENG MD on 12/19/2024 at 3:35 PM

## 2024-12-19 NOTE — OR NURSING
TRANSFER - OUT REPORT:           Verbal report given to CURT Arce on July Kerr  being transferred to IR Recovery for routine post-op      Report consisted of patient’s Situation, Background, Assessment and Recommendations(SBAR).          Information from the following report(s) SBAR, Procedure Summary, and MAR was reviewed with the receiving nurse.       Opportunity for questions and clarification was provided.          Conscious Sedation:    25 Mcg of Fentanyl administered   0.5 Mg of Versed administered   0 Mg of Benadryl administered        Pt tolerated procedure well.     bandaid-type dressing clean, dry, intact, and nontender    VITALS:  BP (!) 118/57   Pulse 95   Temp 97.8 °F (36.6 °C)   Resp 16   Ht 1.676 m (5' 6\")   Wt 82.1 kg (181 lb)   SpO2 98%   BMI 29.21 kg/m²

## 2024-12-20 ENCOUNTER — ANESTHESIA EVENT (OUTPATIENT)
Dept: ENDOSCOPY | Age: 64
End: 2024-12-20
Payer: COMMERCIAL

## 2024-12-20 ENCOUNTER — ANESTHESIA (OUTPATIENT)
Dept: ENDOSCOPY | Age: 64
End: 2024-12-20
Payer: COMMERCIAL

## 2024-12-20 ENCOUNTER — APPOINTMENT (OUTPATIENT)
Dept: GENERAL RADIOLOGY | Age: 64
DRG: 988 | End: 2024-12-20
Payer: COMMERCIAL

## 2024-12-20 PROBLEM — R11.2 NAUSEA & VOMITING: Status: ACTIVE | Noted: 2024-12-17

## 2024-12-20 LAB
ANION GAP SERPL CALC-SCNC: 15 MMOL/L (ref 7–16)
BACTERIA SPEC CULT: NORMAL
BASOPHILS # BLD: 0 K/UL (ref 0–0.2)
BASOPHILS NFR BLD: 0 % (ref 0–2)
BUN SERPL-MCNC: 6 MG/DL (ref 8–23)
CALCIUM SERPL-MCNC: 9.8 MG/DL (ref 8.8–10.2)
CHLORIDE SERPL-SCNC: 95 MMOL/L (ref 98–107)
CO2 SERPL-SCNC: 25 MMOL/L (ref 20–29)
CREAT SERPL-MCNC: 0.47 MG/DL (ref 0.6–1.1)
DIFFERENTIAL METHOD BLD: ABNORMAL
EOSINOPHIL # BLD: 0.1 K/UL (ref 0–0.8)
EOSINOPHIL NFR BLD: 1 % (ref 0.5–7.8)
ERYTHROCYTE [DISTWIDTH] IN BLOOD BY AUTOMATED COUNT: 14.2 % (ref 11.9–14.6)
GLUCOSE SERPL-MCNC: 91 MG/DL (ref 70–99)
HCT VFR BLD AUTO: 31.9 % (ref 35.8–46.3)
HGB BLD-MCNC: 9.8 G/DL (ref 11.7–15.4)
IMM GRANULOCYTES # BLD AUTO: 0.1 K/UL (ref 0–0.5)
IMM GRANULOCYTES NFR BLD AUTO: 1 % (ref 0–5)
LYMPHOCYTES # BLD: 0.9 K/UL (ref 0.5–4.6)
LYMPHOCYTES NFR BLD: 9 % (ref 13–44)
MCH RBC QN AUTO: 27.8 PG (ref 26.1–32.9)
MCHC RBC AUTO-ENTMCNC: 30.7 G/DL (ref 31.4–35)
MCV RBC AUTO: 90.6 FL (ref 82–102)
MONOCYTES # BLD: 0.5 K/UL (ref 0.1–1.3)
MONOCYTES NFR BLD: 5 % (ref 4–12)
NEUTS SEG # BLD: 8.6 K/UL (ref 1.7–8.2)
NEUTS SEG NFR BLD: 84 % (ref 43–78)
NRBC # BLD: 0 K/UL (ref 0–0.2)
PLATELET # BLD AUTO: 337 K/UL (ref 150–450)
PMV BLD AUTO: 9.7 FL (ref 9.4–12.3)
POTASSIUM SERPL-SCNC: 4.5 MMOL/L (ref 3.5–5.1)
RBC # BLD AUTO: 3.52 M/UL (ref 4.05–5.2)
SERVICE CMNT-IMP: NORMAL
SODIUM SERPL-SCNC: 135 MMOL/L (ref 136–145)
WBC # BLD AUTO: 10.2 K/UL (ref 4.3–11.1)

## 2024-12-20 PROCEDURE — 2500000003 HC RX 250 WO HCPCS: Performed by: HOSPITALIST

## 2024-12-20 PROCEDURE — 2709999900 HC NON-CHARGEABLE SUPPLY: Performed by: INTERNAL MEDICINE

## 2024-12-20 PROCEDURE — 2580000003 HC RX 258: Performed by: HOSPITALIST

## 2024-12-20 PROCEDURE — 88305 TISSUE EXAM BY PATHOLOGIST: CPT

## 2024-12-20 PROCEDURE — 6360000002 HC RX W HCPCS: Performed by: HOSPITALIST

## 2024-12-20 PROCEDURE — 96366 THER/PROPH/DIAG IV INF ADDON: CPT

## 2024-12-20 PROCEDURE — 7100000001 HC PACU RECOVERY - ADDTL 15 MIN: Performed by: INTERNAL MEDICINE

## 2024-12-20 PROCEDURE — 43248 EGD GUIDE WIRE INSERTION: CPT | Performed by: INTERNAL MEDICINE

## 2024-12-20 PROCEDURE — 96376 TX/PRO/DX INJ SAME DRUG ADON: CPT

## 2024-12-20 PROCEDURE — 36415 COLL VENOUS BLD VENIPUNCTURE: CPT

## 2024-12-20 PROCEDURE — 96372 THER/PROPH/DIAG INJ SC/IM: CPT

## 2024-12-20 PROCEDURE — 2500000003 HC RX 250 WO HCPCS

## 2024-12-20 PROCEDURE — 85025 COMPLETE CBC W/AUTO DIFF WBC: CPT

## 2024-12-20 PROCEDURE — 80048 BASIC METABOLIC PNL TOTAL CA: CPT

## 2024-12-20 PROCEDURE — 6360000002 HC RX W HCPCS

## 2024-12-20 PROCEDURE — C1769 GUIDE WIRE: HCPCS | Performed by: INTERNAL MEDICINE

## 2024-12-20 PROCEDURE — 6370000000 HC RX 637 (ALT 250 FOR IP): Performed by: NURSE PRACTITIONER

## 2024-12-20 PROCEDURE — 0DB98ZX EXCISION OF DUODENUM, VIA NATURAL OR ARTIFICIAL OPENING ENDOSCOPIC, DIAGNOSTIC: ICD-10-PCS | Performed by: INTERNAL MEDICINE

## 2024-12-20 PROCEDURE — 2580000003 HC RX 258

## 2024-12-20 PROCEDURE — 3700000000 HC ANESTHESIA ATTENDED CARE: Performed by: INTERNAL MEDICINE

## 2024-12-20 PROCEDURE — 3700000001 HC ADD 15 MINUTES (ANESTHESIA): Performed by: INTERNAL MEDICINE

## 2024-12-20 PROCEDURE — 3609012700 HC EGD DILATION SAVORY: Performed by: INTERNAL MEDICINE

## 2024-12-20 PROCEDURE — 2500000003 HC RX 250 WO HCPCS: Performed by: NURSE PRACTITIONER

## 2024-12-20 PROCEDURE — 6370000000 HC RX 637 (ALT 250 FOR IP): Performed by: HOSPITALIST

## 2024-12-20 PROCEDURE — 6370000000 HC RX 637 (ALT 250 FOR IP)

## 2024-12-20 PROCEDURE — 97530 THERAPEUTIC ACTIVITIES: CPT

## 2024-12-20 PROCEDURE — 99222 1ST HOSP IP/OBS MODERATE 55: CPT | Performed by: INTERNAL MEDICINE

## 2024-12-20 PROCEDURE — 1100000003 HC PRIVATE W/ TELEMETRY

## 2024-12-20 PROCEDURE — 74220 X-RAY XM ESOPHAGUS 1CNTRST: CPT

## 2024-12-20 PROCEDURE — 7100000000 HC PACU RECOVERY - FIRST 15 MIN: Performed by: INTERNAL MEDICINE

## 2024-12-20 PROCEDURE — 43239 EGD BIOPSY SINGLE/MULTIPLE: CPT | Performed by: INTERNAL MEDICINE

## 2024-12-20 RX ORDER — FAMOTIDINE 20 MG/1
20 TABLET, FILM COATED ORAL 2 TIMES DAILY
Status: DISCONTINUED | OUTPATIENT
Start: 2024-12-20 | End: 2024-12-22 | Stop reason: HOSPADM

## 2024-12-20 RX ORDER — PROPOFOL 10 MG/ML
INJECTION, EMULSION INTRAVENOUS
Status: DISCONTINUED | OUTPATIENT
Start: 2024-12-20 | End: 2024-12-20 | Stop reason: SDUPTHER

## 2024-12-20 RX ORDER — DOCUSATE SODIUM 100 MG/1
100 CAPSULE, LIQUID FILLED ORAL DAILY
Status: DISCONTINUED | OUTPATIENT
Start: 2024-12-20 | End: 2024-12-22 | Stop reason: HOSPADM

## 2024-12-20 RX ORDER — LIDOCAINE HYDROCHLORIDE 20 MG/ML
INJECTION, SOLUTION EPIDURAL; INFILTRATION; INTRACAUDAL; PERINEURAL
Status: DISCONTINUED | OUTPATIENT
Start: 2024-12-20 | End: 2024-12-20 | Stop reason: SDUPTHER

## 2024-12-20 RX ORDER — BISACODYL 5 MG/1
5 TABLET, DELAYED RELEASE ORAL DAILY PRN
Status: DISCONTINUED | OUTPATIENT
Start: 2024-12-20 | End: 2024-12-22 | Stop reason: HOSPADM

## 2024-12-20 RX ORDER — SODIUM CHLORIDE 0.9 % (FLUSH) 0.9 %
SYRINGE (ML) INJECTION
Status: DISCONTINUED | OUTPATIENT
Start: 2024-12-20 | End: 2024-12-20 | Stop reason: SDUPTHER

## 2024-12-20 RX ORDER — POLYETHYLENE GLYCOL 3350 17 G/17G
17 POWDER, FOR SOLUTION ORAL DAILY
Status: DISCONTINUED | OUTPATIENT
Start: 2024-12-20 | End: 2024-12-22 | Stop reason: HOSPADM

## 2024-12-20 RX ADMIN — SODIUM CHLORIDE, PRESERVATIVE FREE 5 ML: 5 INJECTION INTRAVENOUS at 20:37

## 2024-12-20 RX ADMIN — DOCUSATE SODIUM 100 MG: 100 CAPSULE, LIQUID FILLED ORAL at 16:12

## 2024-12-20 RX ADMIN — BARIUM SULFATE 175 ML: 0.6 SUSPENSION ORAL at 09:36

## 2024-12-20 RX ADMIN — ANTACID/ANTIFLATULENT 1 EACH: 380; 550; 10; 10 GRANULE, EFFERVESCENT ORAL at 09:36

## 2024-12-20 RX ADMIN — FAMOTIDINE 20 MG: 20 TABLET, FILM COATED ORAL at 20:37

## 2024-12-20 RX ADMIN — POLYETHYLENE GLYCOL 3350 17 G: 17 POWDER, FOR SOLUTION ORAL at 16:12

## 2024-12-20 RX ADMIN — PIPERACILLIN AND TAZOBACTAM 3375 MG: 3; .375 INJECTION, POWDER, LYOPHILIZED, FOR SOLUTION INTRAVENOUS at 16:09

## 2024-12-20 RX ADMIN — FAMOTIDINE 20 MG: 10 INJECTION, SOLUTION INTRAVENOUS at 10:34

## 2024-12-20 RX ADMIN — PIPERACILLIN AND TAZOBACTAM 3375 MG: 3; .375 INJECTION, POWDER, LYOPHILIZED, FOR SOLUTION INTRAVENOUS at 06:37

## 2024-12-20 RX ADMIN — SODIUM CHLORIDE 40 MG: 9 INJECTION INTRAMUSCULAR; INTRAVENOUS; SUBCUTANEOUS at 16:12

## 2024-12-20 RX ADMIN — ENOXAPARIN SODIUM 40 MG: 100 INJECTION SUBCUTANEOUS at 10:34

## 2024-12-20 RX ADMIN — BARIUM SULFATE 1 TABLET: 700 TABLET ORAL at 09:36

## 2024-12-20 RX ADMIN — PIPERACILLIN AND TAZOBACTAM 3375 MG: 3; .375 INJECTION, POWDER, LYOPHILIZED, FOR SOLUTION INTRAVENOUS at 23:45

## 2024-12-20 RX ADMIN — PROPOFOL 60 MG: 10 INJECTION, EMULSION INTRAVENOUS at 14:10

## 2024-12-20 RX ADMIN — SODIUM CHLORIDE, PRESERVATIVE FREE 10 ML: 5 INJECTION INTRAVENOUS at 14:15

## 2024-12-20 RX ADMIN — PROPOFOL 30 MG: 10 INJECTION, EMULSION INTRAVENOUS at 14:12

## 2024-12-20 RX ADMIN — LIDOCAINE HYDROCHLORIDE 40 MG: 20 INJECTION, SOLUTION EPIDURAL; INFILTRATION; INTRACAUDAL; PERINEURAL at 14:10

## 2024-12-20 RX ADMIN — SODIUM CHLORIDE, PRESERVATIVE FREE 10 ML: 5 INJECTION INTRAVENOUS at 10:34

## 2024-12-20 RX ADMIN — PANTOPRAZOLE SODIUM 40 MG: 40 TABLET, DELAYED RELEASE ORAL at 06:36

## 2024-12-20 RX ADMIN — PROPOFOL 30 MG: 10 INJECTION, EMULSION INTRAVENOUS at 14:13

## 2024-12-20 RX ADMIN — PROPOFOL 160 MCG/KG/MIN: 10 INJECTION, EMULSION INTRAVENOUS at 14:11

## 2024-12-20 ASSESSMENT — PAIN - FUNCTIONAL ASSESSMENT: PAIN_FUNCTIONAL_ASSESSMENT: 0-10

## 2024-12-20 NOTE — PERIOP NOTE
TRANSFER - OUT REPORT:    Verbal report given to CURT [place] on July Kerr  being transferred to [facility] for routine post-op       Report consisted of patient’s Situation, Background, Assessment and   Recommendations(SBAR).     Information from the following report(s) Nurse Handoff Report, Adult Overview, Intake/Output, MAR, Cardiac Rhythm  , and Neuro Assessment was reviewed with the receiving nurse.    Lines:   Peripheral IV 12/17/24 Proximal;Right Forearm (Active)   Site Assessment Clean, dry & intact 12/20/24 1444   Line Status Normal saline locked 12/20/24 1444   Line Care Connections checked and tightened;Cap changed 12/20/24 1444   Phlebitis Assessment No symptoms 12/20/24 1444   Infiltration Assessment 0 12/20/24 1444   Alcohol Cap Used Yes 12/20/24 1221   Dressing Status Clean, dry & intact 12/20/24 1444   Dressing Type Transparent 12/20/24 1444   Dressing Intervention New 12/19/24 1730       Peripheral IV 12/18/24 Left Forearm (Active)   Site Assessment Clean, dry & intact 12/20/24 1444   Line Status Capped;Normal saline locked;Flushed 12/20/24 1444   Line Care Connections checked and tightened;Cap changed 12/20/24 1444   Phlebitis Assessment No symptoms 12/20/24 1444   Infiltration Assessment 0 12/20/24 1444   Alcohol Cap Used Yes 12/20/24 1221   Dressing Status Clean, dry & intact 12/20/24 1444   Dressing Type Transparent 12/20/24 1444        Opportunity for questions and clarification was provided.      Patient transported with:   O2 @ 2 liters  Tech    VTE prophylaxis orders have been written for July Kerr.    Patient and family given floor number and nurses name.  Family updated re: pt status after security code verified.

## 2024-12-20 NOTE — PLAN OF CARE
EGD complete, see procedure report. Duodenitis seen, biopsied. Otherwise normal exam, no stenosis or stricture seen in esophagus. Empiric dilation of esophagus.     - Advance diet as tolerated   - Antiemetics as needed   - Will follow up pathology results   - GI team will sign off at this time, please call with questions or concerns       Angy Curtis MD  [facility]

## 2024-12-20 NOTE — CARE COORDINATION
Pt s/p biopsy for R breast mass. Pt declined therapies STR recommendation and does not want HH services post discharge. Will continue to monitor.

## 2024-12-20 NOTE — PLAN OF CARE
Problem: Discharge Planning  Goal: Discharge to home or other facility with appropriate resources  Outcome: Progressing  Flowsheets (Taken 12/19/2024 3630 by Sarai Sarah, CURT)  Discharge to home or other facility with appropriate resources: Identify barriers to discharge with patient and caregiver     Problem: Pain  Goal: Verbalizes/displays adequate comfort level or baseline comfort level  Outcome: Progressing     Problem: Safety - Adult  Goal: Free from fall injury  Outcome: Progressing     Problem: ABCDS Injury Assessment  Goal: Absence of physical injury  Outcome: Progressing

## 2024-12-20 NOTE — ANESTHESIA PRE PROCEDURE
MD        medicated lip ointment (BLISTEX)   Topical PRN Kelli Riojas MD        ondansetron (ZOFRAN-ODT) disintegrating tablet 4 mg  4 mg Oral Q8H PRN Kelli Riojas MD        Or    ondansetron (ZOFRAN) injection 4 mg  4 mg IntraVENous Q4H PRN Kelli Riojas MD        promethazine (PHENERGAN) tablet 25 mg  25 mg Oral Q6H PRN Kelli Riojas MD        prochlorperazine (COMPAZINE) injection 5 mg  5 mg IntraVENous Q6H PRN Kelli Riojas MD   5 mg at 12/19/24 2358    famotidine (PEPCID) 20 mg in sodium chloride (PF) 0.9 % 10 mL injection  20 mg IntraVENous Q12H Kelli Riojas MD   20 mg at 12/20/24 1034    pantoprazole (PROTONIX) tablet 40 mg  40 mg Oral QAM AC Kelli Riojas MD   40 mg at 12/20/24 0636    piperacillin-tazobactam (ZOSYN) 3,375 mg in sodium chloride 0.9 % 50 mL IVPB (mini-bag)  3,375 mg IntraVENous Q8H Kelli Riojas MD   Stopped at 12/20/24 1035       Allergies:    Allergies   Allergen Reactions    Keflex [Cephalexin]     Other Other (See Comments)     Seasonal allergies        Problem List:    Patient Active Problem List   Diagnosis Code    Elevated blood pressure reading in office without diagnosis of hypertension R03.0    Large mass of right breast N63.10    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    Soft tissue neoplasm filling right breast D49.2    Skin yeast infection B37.2    Abnormal positron emission tomography (PET) scan R94.8    Axillary adenopathy R59.0    Intractable nausea and vomiting R11.2    High anion gap metabolic acidosis E87.29    Acute UTI N39.0    Hypokalemia E87.6    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

## 2024-12-20 NOTE — PROGRESS NOTES
ACUTE PHYSICAL THERAPY GOALS:   (Developed with and agreed upon by patient and/or caregiver.)  LTG:  (1.)Ms. Kerr will move from supine to sit and sit to supine , scoot up and down, and roll side to side in bed with INDEPENDENT within 7 treatment day(s).    (2.)Ms. Kerr will transfer from bed to chair and chair to bed with INDEPENDENT using the least restrictive device within 7 treatment day(s).    (3.)Ms. Kerr will ambulate with STAND BY ASSIST for 250 feet with the least restrictive device within 7 treatment day(s).  (4.)Ms. Kerr will tolerate 23+ minutes of therapeutic activity within 7 treatment days for increased activity tolerance and overall functional mobility.    PHYSICAL THERAPY: Daily Note AM   (Link to Caseload Tracking: PT Visit Days : 2  Time In/Out PT Charge Capture  Rehab Caseload Tracker  Orders    July Kerr is a 64 y.o. female   PRIMARY DIAGNOSIS: Intractable nausea and vomiting  Intractable nausea and vomiting [R11.2]       Observation: Payor: [employer] / Plan: AM[employer]TER / Product Type: *No Product type* /     ASSESSMENT:     REHAB RECOMMENDATIONS:   Recommendation to date pending progress:  Setting:  Short-term Rehab    Equipment:    To Be Determined     ASSESSMENT:  Ms. Kerr was supine upon contact and agreeable to PT. Transport arrived to take patient off the floor for modified barium swallow study. Patient performed supine to sit and transfer to standing with min assist, additional time and cues for technique/hand placement. Once standing patient ambulated 12' to stretcher in hallway with rolling walker, min assist for balance and cues for sequencing with rolling walker. Patient performed sit to supine onto stretcher with min assist where she was repositioned for comfort. Slow progress towards PT goals. Limited session. Will continue PT efforts.     SUBJECTIVE:   Ms. Kerr states, \"Feel how heavy this is\"     Social/Functional Lives With: Parent  Type of Home: House  Home Layout:

## 2024-12-20 NOTE — PROGRESS NOTES
Hospitalist Progress Note   Admit Date:  2024  7:07 PM   Name:  July Kerr   Age:  64 y.o.  Sex:  female  :  1960   MRN:  [id number]   Room:  [facility]    Presenting/Chief Complaint: Nausea and Abdominal Pain     Reason(s) for Admission: Intractable nausea and vomiting [R11.2]     Hospital Course:   July Kerr is a 64 y.o. 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  Patient follows up with Dr. Cabral, first seen on 2024 with complaints of massive right breast mass that had been growing for past several years.  Mammogram in 2084 with indeterminate complex mass in the right breast, measuring 18 cm and prominent right axillary lymphadenopathy.  She is status post right breast biopsy on 10/30/2024 with pathology positive for phyllodes tumor and right axillary lymph node path was benign.  PET/CT on  with right breast mass and subcentimeter indeterminate right axillary nodes.  Dr. Taveras has requested ultrasound-guided right axillary node biopsy which has been scheduled for  based on the results plan would be for possible sentinel node excision versus axillary dissection.      Subjective & 24hr Events:   Patient seen and examined at bedside.  Patient feeling well.  Patient tolerated her EGD procedure well    ROS:  10 point review of system is negative except for what mentioned above.      Assessment & Plan:     Principal Problem:    Intractable nausea and vomiting    Acute UTI  Plan:   Empiric/symptomatic treatment with as needed antiemetics including Zofran, Phenergan and Compazine.  Pepcid 20 mg IV twice daily and Protonix 40 mg p.o. daily.  CTAP was negative for any acute intra-abdominal pathology.  Patient continues to have nausea and reports of having some dysphagia.  GI evaluated and performed EGD based on barium study results.  EGD showed duodenitis which was    12/20/24 1349 97.8 °F (36.6 °C) (!) 101 15 132/60 97 %   12/20/24 1340 97.8 °F (36.6 °C) -- -- -- --   12/20/24 1204 98.1 °F (36.7 °C) 97 17 (!) 109/57 97 %   12/20/24 0819 98.6 °F (37 °C) 94 -- 120/64 98 %   12/20/24 0349 97.5 °F (36.4 °C) 94 16 115/65 96 %   12/20/24 0015 97.7 °F (36.5 °C) 91 18 117/62 94 %   12/19/24 2110 97.5 °F (36.4 °C) 88 16 114/64 96 %   12/19/24 1728 98.2 °F (36.8 °C) 94 17 117/61 97 %   12/19/24 1643 -- 95 -- 123/61 94 %   12/19/24 1633 -- 96 -- (!) 120/58 94 %   12/19/24 1623 -- -- -- (!) 121/57 94 %   12/19/24 1615 -- 95 16 -- --   12/19/24 1613 -- -- -- 119/63 95 %   12/19/24 1605 -- 99 16 (!) 118/57 98 %   12/19/24 1600 -- 99 16 (!) 117/58 96 %   12/19/24 1555 -- 100 18 (!) 119/58 98 %   12/19/24 1550 -- (!) 101 18 (!) 123/59 97 %       Oxygen Therapy  SpO2: 98 %  Pulse via Oximetry: 87 beats per minute  Pulse Oximeter Device Mode: Continuous  Pulse Oximeter Device Location: Finger  O2 Device: ETCO2 nasal cannula  O2 Flow Rate (L/min): 2 L/min    Estimated body mass index is 32.1 kg/m² as calculated from the following:    Height as of this encounter: 1.676 m (5' 6\").    Weight as of this encounter: 90.2 kg (198 lb 14.4 oz).    Intake/Output Summary (Last 24 hours) at 12/20/2024 1548  Last data filed at 12/20/2024 1412  Gross per 24 hour   Intake 50 ml   Output 350 ml   Net -300 ml         Physical Exam:     General:    Elderly female, obese, alert and awake, lethargic, mild to moderate distress due to pain.  Head:  Normocephalic, atraumatic  Eyes:  Sclerae appear normal.  Pupils equally round.  ENT:  Nares appear normal.  Moist oral mucosa  Neck:  No restricted ROM.  Trachea midline   CV:   RRR.  No m/r/g.  No jugular venous distension.  Lungs:   CTAB.  No wheezing, rhonchi, or rales.  Symmetric expansion.  Abdomen:   Soft, mildly tender in epigastric region., nondistended.  Extremities: No cyanosis or clubbing.  No edema  Skin:     No rashes.  Normal coloration.   Warm and dry.   Collection Time: 12/19/24  5:35 AM   Result Value Ref Range    Magnesium 1.9 1.8 - 2.4 mg/dL   Basic Metabolic Panel w/ Reflex to MG    Collection Time: 12/20/24 11:31 AM   Result Value Ref Range    Sodium 135 (L) 136 - 145 mmol/L    Potassium 4.5 3.5 - 5.1 mmol/L    Chloride 95 (L) 98 - 107 mmol/L    CO2 25 20 - 29 mmol/L    Anion Gap 15 7 - 16 mmol/L    Glucose 91 70 - 99 mg/dL    BUN 6 (L) 8 - 23 MG/DL    Creatinine 0.47 (L) 0.60 - 1.10 MG/DL    Est, Glom Filt Rate >90 >60 ml/min/1.73m2    Calcium 9.8 8.8 - 10.2 MG/DL   CBC with Auto Differential    Collection Time: 12/20/24 11:31 AM   Result Value Ref Range    WBC 10.2 4.3 - 11.1 K/uL    RBC 3.52 (L) 4.05 - 5.2 M/uL    Hemoglobin 9.8 (L) 11.7 - 15.4 g/dL    Hematocrit 31.9 (L) 35.8 - 46.3 %    MCV 90.6 82 - 102 FL    MCH 27.8 26.1 - 32.9 PG    MCHC 30.7 (L) 31.4 - 35.0 g/dL    RDW 14.2 11.9 - 14.6 %    Platelets 337 150 - 450 K/uL    MPV 9.7 9.4 - 12.3 FL    nRBC 0.00 0.0 - 0.2 K/uL    Differential Type AUTOMATED      Neutrophils % 84 (H) 43 - 78 %    Lymphocytes % 9 (L) 13 - 44 %    Monocytes % 5 4.0 - 12.0 %    Eosinophils % 1 0.5 - 7.8 %    Basophils % 0 0.0 - 2.0 %    Immature Granulocytes % 1 0.0 - 5.0 %    Neutrophils Absolute 8.6 (H) 1.7 - 8.2 K/UL    Lymphocytes Absolute 0.9 0.5 - 4.6 K/UL    Monocytes Absolute 0.5 0.1 - 1.3 K/UL    Eosinophils Absolute 0.1 0.0 - 0.8 K/UL    Basophils Absolute 0.0 0.0 - 0.2 K/UL    Immature Granulocytes Absolute 0.1 0.0 - 0.5 K/UL       Recent Labs     12/18/24  1026   COVID19 NOT DETECTED       Current Meds:  Current Facility-Administered Medications   Medication Dose Route Frequency    pantoprazole (PROTONIX) 40 mg in sodium chloride (PF) 0.9 % 10 mL injection  40 mg IntraVENous Q12H    famotidine (PEPCID) tablet 20 mg  20 mg Oral BID    docusate sodium (COLACE) capsule 100 mg  100 mg Oral Daily    polyethylene glycol (GLYCOLAX) packet 17 g  17 g Oral Daily    bisacodyl (DULCOLAX) EC tablet 5 mg  5 mg Oral Daily PRN

## 2024-12-20 NOTE — PROGRESS NOTES
TRANSFER - IN REPORT:    Verbal report received from CURT Lima on July Kerr  being received from 409 for ordered procedure      Report consisted of patient's Situation, Background, Assessment and   Recommendations(SBAR).     Information from the following report(s) Nurse Handoff Report, Intake/Output, MAR, and Recent Results was reviewed with the receiving nurse.    Opportunity for questions and clarification was provided.      Assessment completed upon patient's arrival to unit and care assumed.

## 2024-12-20 NOTE — CONSULTS
Consult Note            Date:12/20/2024        Patient Name:July Kerr     YOB: 1960     Age:64 y.o.    Inpatient consult to GI  Consult performed by: Cassie Fletcher PA  Consult ordered by: Zohaib Pemberton MD          Chief Complaint     Chief Complaint   Patient presents with    Nausea    Abdominal Pain       History Obtained From   patient    History of Present Illness   Patient is a 64 year old female, with a hx of phylloides tumor of the R breast, who is presenting for nausea, vomiting, dysphagia, weight loss. Esophagram showing small HH, GERD, and mild narrowing of distal esophagus. She is being evaluated for R breast mass; underwent IR biopsy of lymph nodes this admission with potential mastectomy. She reports vomiting/regurgitation with PO intake, reflux, about 25 lb weight loss for the past month. Can only tolerate water; any other PO intake she will regurgitate, or it will get stuck in mid sternum. No vomiting if no PO intake; no vomiting today. Has been taking Pepcid, but cannot keep it down. Did help in the past for her GERD. No abdominal pain. Has not passed a BM in 20 days; still passing gas. No prior GI history or endoscopy. No NSAIDS, blood thinners at home.. No ETOH, drugs, tobacco use. No new medications.     Lovenox given this AM.    Labs: Hgb 9.8 (8.9),    Imaging: FL BA SWALLOW ESOPHOGRAM    Result Date: 12/20/2024  1. Small sliding hiatal hernia with spontaneous gastroesophageal reflux. 2. Tertiary contractions in the mid and distal esophagus with mild dilatation of the distal third. 3. Mild narrowing of the distal esophagus proximal to the small sliding hiatal hernia. 4. Limited views of the stomach and duodenum show no abnormalities    Medications: IV PPI BID, pepcid    Past Medical History     Past Medical History:   Diagnosis Date    Left foot pain 09/11/2019    Added automatically from request for surgery [id number]          Past Surgical History     Past Surgical

## 2024-12-21 LAB
ANION GAP SERPL CALC-SCNC: 17 MMOL/L (ref 7–16)
BASOPHILS # BLD: 0 K/UL (ref 0–0.2)
BASOPHILS NFR BLD: 0 % (ref 0–2)
BUN SERPL-MCNC: 5 MG/DL (ref 8–23)
CALCIUM SERPL-MCNC: 8.5 MG/DL (ref 8.8–10.2)
CHLORIDE SERPL-SCNC: 93 MMOL/L (ref 98–107)
CO2 SERPL-SCNC: 23 MMOL/L (ref 20–29)
CREAT SERPL-MCNC: 0.39 MG/DL (ref 0.6–1.1)
DIFFERENTIAL METHOD BLD: ABNORMAL
EOSINOPHIL # BLD: 0.2 K/UL (ref 0–0.8)
EOSINOPHIL NFR BLD: 2 % (ref 0.5–7.8)
ERYTHROCYTE [DISTWIDTH] IN BLOOD BY AUTOMATED COUNT: 14 % (ref 11.9–14.6)
GLUCOSE SERPL-MCNC: 94 MG/DL (ref 70–99)
HCT VFR BLD AUTO: 32.9 % (ref 35.8–46.3)
HGB BLD-MCNC: 10.4 G/DL (ref 11.7–15.4)
IMM GRANULOCYTES # BLD AUTO: 0.1 K/UL (ref 0–0.5)
IMM GRANULOCYTES NFR BLD AUTO: 1 % (ref 0–5)
LYMPHOCYTES # BLD: 0.9 K/UL (ref 0.5–4.6)
LYMPHOCYTES NFR BLD: 10 % (ref 13–44)
MCH RBC QN AUTO: 28.3 PG (ref 26.1–32.9)
MCHC RBC AUTO-ENTMCNC: 31.6 G/DL (ref 31.4–35)
MCV RBC AUTO: 89.4 FL (ref 82–102)
MONOCYTES # BLD: 0.4 K/UL (ref 0.1–1.3)
MONOCYTES NFR BLD: 5 % (ref 4–12)
NEUTS SEG # BLD: 7 K/UL (ref 1.7–8.2)
NEUTS SEG NFR BLD: 82 % (ref 43–78)
NRBC # BLD: 0 K/UL (ref 0–0.2)
PLATELET # BLD AUTO: 321 K/UL (ref 150–450)
PMV BLD AUTO: 9.7 FL (ref 9.4–12.3)
POTASSIUM SERPL-SCNC: 4 MMOL/L (ref 3.5–5.1)
RBC # BLD AUTO: 3.68 M/UL (ref 4.05–5.2)
SODIUM SERPL-SCNC: 133 MMOL/L (ref 136–145)
WBC # BLD AUTO: 8.5 K/UL (ref 4.3–11.1)

## 2024-12-21 PROCEDURE — 2580000003 HC RX 258: Performed by: HOSPITALIST

## 2024-12-21 PROCEDURE — 6360000002 HC RX W HCPCS

## 2024-12-21 PROCEDURE — 6360000002 HC RX W HCPCS: Performed by: HOSPITALIST

## 2024-12-21 PROCEDURE — 80048 BASIC METABOLIC PNL TOTAL CA: CPT

## 2024-12-21 PROCEDURE — 36415 COLL VENOUS BLD VENIPUNCTURE: CPT

## 2024-12-21 PROCEDURE — 6360000002 HC RX W HCPCS: Performed by: INTERNAL MEDICINE

## 2024-12-21 PROCEDURE — 2500000003 HC RX 250 WO HCPCS: Performed by: HOSPITALIST

## 2024-12-21 PROCEDURE — 6370000000 HC RX 637 (ALT 250 FOR IP)

## 2024-12-21 PROCEDURE — 1100000003 HC PRIVATE W/ TELEMETRY

## 2024-12-21 PROCEDURE — 85025 COMPLETE CBC W/AUTO DIFF WBC: CPT

## 2024-12-21 PROCEDURE — 2580000003 HC RX 258

## 2024-12-21 RX ORDER — METOCLOPRAMIDE HYDROCHLORIDE 5 MG/ML
5 INJECTION INTRAMUSCULAR; INTRAVENOUS
Status: DISCONTINUED | OUTPATIENT
Start: 2024-12-21 | End: 2024-12-22

## 2024-12-21 RX ADMIN — DOCUSATE SODIUM 100 MG: 100 CAPSULE, LIQUID FILLED ORAL at 08:37

## 2024-12-21 RX ADMIN — SODIUM CHLORIDE 40 MG: 9 INJECTION INTRAMUSCULAR; INTRAVENOUS; SUBCUTANEOUS at 14:29

## 2024-12-21 RX ADMIN — METOCLOPRAMIDE 5 MG: 5 INJECTION, SOLUTION INTRAMUSCULAR; INTRAVENOUS at 20:26

## 2024-12-21 RX ADMIN — POLYETHYLENE GLYCOL 3350 17 G: 17 POWDER, FOR SOLUTION ORAL at 08:37

## 2024-12-21 RX ADMIN — FAMOTIDINE 20 MG: 20 TABLET, FILM COATED ORAL at 20:28

## 2024-12-21 RX ADMIN — FAMOTIDINE 20 MG: 20 TABLET, FILM COATED ORAL at 08:36

## 2024-12-21 RX ADMIN — SODIUM CHLORIDE, PRESERVATIVE FREE 10 ML: 5 INJECTION INTRAVENOUS at 08:36

## 2024-12-21 RX ADMIN — METOCLOPRAMIDE 5 MG: 5 INJECTION, SOLUTION INTRAMUSCULAR; INTRAVENOUS at 11:43

## 2024-12-21 RX ADMIN — SODIUM CHLORIDE 40 MG: 9 INJECTION INTRAMUSCULAR; INTRAVENOUS; SUBCUTANEOUS at 02:46

## 2024-12-21 RX ADMIN — SODIUM CHLORIDE, PRESERVATIVE FREE 10 ML: 5 INJECTION INTRAVENOUS at 20:24

## 2024-12-21 RX ADMIN — PIPERACILLIN AND TAZOBACTAM 3375 MG: 3; .375 INJECTION, POWDER, LYOPHILIZED, FOR SOLUTION INTRAVENOUS at 08:49

## 2024-12-21 RX ADMIN — ENOXAPARIN SODIUM 40 MG: 100 INJECTION SUBCUTANEOUS at 08:37

## 2024-12-21 RX ADMIN — PIPERACILLIN AND TAZOBACTAM 3375 MG: 3; .375 INJECTION, POWDER, LYOPHILIZED, FOR SOLUTION INTRAVENOUS at 16:12

## 2024-12-21 RX ADMIN — METOCLOPRAMIDE 5 MG: 5 INJECTION, SOLUTION INTRAMUSCULAR; INTRAVENOUS at 16:14

## 2024-12-21 ASSESSMENT — PAIN SCALES - GENERAL
PAINLEVEL_OUTOF10: 0

## 2024-12-21 NOTE — PROGRESS NOTES
Hospitalist Progress Note   Admit Date:  2024  7:07 PM   Name:  July Kerr   Age:  64 y.o.  Sex:  female  :  1960   MRN:  [id number]   Room:  [facility]    Presenting/Chief Complaint: Nausea and Abdominal Pain     Reason(s) for Admission: Intractable nausea and vomiting [R11.2]     Hospital Course:   July Kerr is a 64 y.o. 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  Patient follows up with Dr. Cabral, first seen on 2024 with complaints of massive right breast mass that had been growing for past several years.  Mammogram in 2084 with indeterminate complex mass in the right breast, measuring 18 cm and prominent right axillary lymphadenopathy.  She is status post right breast biopsy on 10/30/2024 with pathology positive for phyllodes tumor and right axillary lymph node path was benign.  PET/CT on  with right breast mass and subcentimeter indeterminate right axillary nodes.  Dr. Taveras has requested ultrasound-guided right axillary node biopsy which has been scheduled for  based on the results plan would be for possible sentinel node excision versus axillary dissection.      Subjective & 24hr Events:   Patient seen and examined at bedside.  Patient still stating that she is nauseous and vomited of a minimal amount of fluid.  Nursing staff reports that they have not seen any vomitus.    ROS:  10 point review of system is negative except for what mentioned above.      Assessment & Plan:     Principal Problem:    Intractable nausea and vomiting    Acute UTI  Plan:   Empiric/symptomatic treatment with as needed antiemetics including Zofran, Phenergan and Compazine.  Pepcid 20 mg IV twice daily and Protonix 40 mg p.o. daily.  CTAP was negative for any acute intra-abdominal pathology.  Patient continues to have nausea and reports of having some dysphagia.  GI evaluated  IntraVENous PRN    enoxaparin (LOVENOX) injection 40 mg  40 mg SubCUTAneous Daily    polyethylene glycol (GLYCOLAX) packet 17 g  17 g Oral Daily PRN    acetaminophen (TYLENOL) tablet 650 mg  650 mg Oral Q6H PRN    Or    acetaminophen (TYLENOL) suppository 650 mg  650 mg Rectal Q6H PRN    medicated lip ointment (BLISTEX)   Topical PRN    ondansetron (ZOFRAN-ODT) disintegrating tablet 4 mg  4 mg Oral Q8H PRN    Or    ondansetron (ZOFRAN) injection 4 mg  4 mg IntraVENous Q4H PRN    promethazine (PHENERGAN) tablet 25 mg  25 mg Oral Q6H PRN    prochlorperazine (COMPAZINE) injection 5 mg  5 mg IntraVENous Q6H PRN    piperacillin-tazobactam (ZOSYN) 3,375 mg in sodium chloride 0.9 % 50 mL IVPB (mini-bag)  3,375 mg IntraVENous Q8H       Signed:  IVELISSE MARTINEZ MD    Part of this note may have been written by using a voice dictation software.  The note has been proof read but may still contain some grammatical/other typographical errors.

## 2024-12-21 NOTE — PROGRESS NOTES
Patient persistently nauseous, offered PRN nausea medications, but patient declines.     MD ordered scheduled IV Reglan in hopes nausea/vomiting will reside.

## 2024-12-22 VITALS
BODY MASS INDEX: 31.29 KG/M2 | HEIGHT: 66 IN | OXYGEN SATURATION: 96 % | WEIGHT: 194.67 LBS | DIASTOLIC BLOOD PRESSURE: 63 MMHG | TEMPERATURE: 98.6 F | SYSTOLIC BLOOD PRESSURE: 118 MMHG | RESPIRATION RATE: 18 BRPM | HEART RATE: 100 BPM

## 2024-12-22 LAB
ANION GAP SERPL CALC-SCNC: 15 MMOL/L (ref 7–16)
BASOPHILS # BLD: 0 K/UL (ref 0–0.2)
BASOPHILS NFR BLD: 0 % (ref 0–2)
BUN SERPL-MCNC: 6 MG/DL (ref 8–23)
CALCIUM SERPL-MCNC: 8 MG/DL (ref 8.8–10.2)
CHLORIDE SERPL-SCNC: 92 MMOL/L (ref 98–107)
CO2 SERPL-SCNC: 24 MMOL/L (ref 20–29)
CREAT SERPL-MCNC: 0.35 MG/DL (ref 0.6–1.1)
DIFFERENTIAL METHOD BLD: ABNORMAL
EOSINOPHIL # BLD: 0 K/UL (ref 0–0.8)
EOSINOPHIL NFR BLD: 0 % (ref 0.5–7.8)
ERYTHROCYTE [DISTWIDTH] IN BLOOD BY AUTOMATED COUNT: 14.2 % (ref 11.9–14.6)
GLUCOSE BLD STRIP.AUTO-MCNC: 91 MG/DL (ref 65–100)
GLUCOSE SERPL-MCNC: 96 MG/DL (ref 70–99)
HCT VFR BLD AUTO: 28.5 % (ref 35.8–46.3)
HGB BLD-MCNC: 8.9 G/DL (ref 11.7–15.4)
IMM GRANULOCYTES # BLD AUTO: 0.1 K/UL (ref 0–0.5)
IMM GRANULOCYTES NFR BLD AUTO: 1 % (ref 0–5)
LYMPHOCYTES # BLD: 0.8 K/UL (ref 0.5–4.6)
LYMPHOCYTES NFR BLD: 9 % (ref 13–44)
MAGNESIUM SERPL-MCNC: 1.6 MG/DL (ref 1.8–2.4)
MAGNESIUM SERPL-MCNC: 2 MG/DL (ref 1.8–2.4)
MCH RBC QN AUTO: 27.8 PG (ref 26.1–32.9)
MCHC RBC AUTO-ENTMCNC: 31.2 G/DL (ref 31.4–35)
MCV RBC AUTO: 89.1 FL (ref 82–102)
MONOCYTES # BLD: 0.5 K/UL (ref 0.1–1.3)
MONOCYTES NFR BLD: 5 % (ref 4–12)
NEUTS SEG # BLD: 7.2 K/UL (ref 1.7–8.2)
NEUTS SEG NFR BLD: 85 % (ref 43–78)
NRBC # BLD: 0 K/UL (ref 0–0.2)
PLATELET # BLD AUTO: 305 K/UL (ref 150–450)
PMV BLD AUTO: 9.5 FL (ref 9.4–12.3)
POTASSIUM SERPL-SCNC: 3.5 MMOL/L (ref 3.5–5.1)
RBC # BLD AUTO: 3.2 M/UL (ref 4.05–5.2)
SERVICE CMNT-IMP: NORMAL
SODIUM SERPL-SCNC: 131 MMOL/L (ref 136–145)
WBC # BLD AUTO: 8.5 K/UL (ref 4.3–11.1)

## 2024-12-22 PROCEDURE — 6360000002 HC RX W HCPCS: Performed by: HOSPITALIST

## 2024-12-22 PROCEDURE — 6370000000 HC RX 637 (ALT 250 FOR IP)

## 2024-12-22 PROCEDURE — 6370000000 HC RX 637 (ALT 250 FOR IP): Performed by: INTERNAL MEDICINE

## 2024-12-22 PROCEDURE — 51798 US URINE CAPACITY MEASURE: CPT

## 2024-12-22 PROCEDURE — 2500000003 HC RX 250 WO HCPCS: Performed by: HOSPITALIST

## 2024-12-22 PROCEDURE — 80048 BASIC METABOLIC PNL TOTAL CA: CPT

## 2024-12-22 PROCEDURE — 36415 COLL VENOUS BLD VENIPUNCTURE: CPT

## 2024-12-22 PROCEDURE — 2580000003 HC RX 258: Performed by: HOSPITALIST

## 2024-12-22 PROCEDURE — 82962 GLUCOSE BLOOD TEST: CPT

## 2024-12-22 PROCEDURE — 6360000002 HC RX W HCPCS: Performed by: INTERNAL MEDICINE

## 2024-12-22 PROCEDURE — 85025 COMPLETE CBC W/AUTO DIFF WBC: CPT

## 2024-12-22 PROCEDURE — 2580000003 HC RX 258

## 2024-12-22 PROCEDURE — 6360000002 HC RX W HCPCS

## 2024-12-22 PROCEDURE — 83735 ASSAY OF MAGNESIUM: CPT

## 2024-12-22 RX ORDER — METOCLOPRAMIDE 10 MG/1
10 TABLET ORAL
Status: DISCONTINUED | OUTPATIENT
Start: 2024-12-22 | End: 2024-12-22 | Stop reason: HOSPADM

## 2024-12-22 RX ORDER — METOCLOPRAMIDE 10 MG/1
10 TABLET ORAL
Qty: 40 TABLET | Refills: 0 | Status: ON HOLD | OUTPATIENT
Start: 2024-12-22 | End: 2025-01-01

## 2024-12-22 RX ADMIN — PIPERACILLIN AND TAZOBACTAM 3375 MG: 3; .375 INJECTION, POWDER, LYOPHILIZED, FOR SOLUTION INTRAVENOUS at 07:51

## 2024-12-22 RX ADMIN — MAGNESIUM SULFATE HEPTAHYDRATE 2000 MG: 40 INJECTION, SOLUTION INTRAVENOUS at 07:50

## 2024-12-22 RX ADMIN — SODIUM CHLORIDE 40 MG: 9 INJECTION INTRAMUSCULAR; INTRAVENOUS; SUBCUTANEOUS at 02:19

## 2024-12-22 RX ADMIN — FAMOTIDINE 20 MG: 20 TABLET, FILM COATED ORAL at 07:59

## 2024-12-22 RX ADMIN — PIPERACILLIN AND TAZOBACTAM 3375 MG: 3; .375 INJECTION, POWDER, LYOPHILIZED, FOR SOLUTION INTRAVENOUS at 00:41

## 2024-12-22 RX ADMIN — METOCLOPRAMIDE 5 MG: 5 INJECTION, SOLUTION INTRAMUSCULAR; INTRAVENOUS at 07:06

## 2024-12-22 RX ADMIN — METOCLOPRAMIDE 10 MG: 10 TABLET ORAL at 11:30

## 2024-12-22 RX ADMIN — ONDANSETRON 4 MG: 2 INJECTION, SOLUTION INTRAMUSCULAR; INTRAVENOUS at 00:36

## 2024-12-22 RX ADMIN — SODIUM CHLORIDE, PRESERVATIVE FREE 10 ML: 5 INJECTION INTRAVENOUS at 07:48

## 2024-12-22 RX ADMIN — DOCUSATE SODIUM 100 MG: 100 CAPSULE, LIQUID FILLED ORAL at 07:59

## 2024-12-22 RX ADMIN — POLYETHYLENE GLYCOL 3350 17 G: 17 POWDER, FOR SOLUTION ORAL at 07:59

## 2024-12-22 RX ADMIN — ENOXAPARIN SODIUM 40 MG: 100 INJECTION SUBCUTANEOUS at 07:59

## 2024-12-22 ASSESSMENT — PAIN SCALES - GENERAL
PAINLEVEL_OUTOF10: 0
PAINLEVEL_OUTOF10: 0

## 2024-12-22 NOTE — PROGRESS NOTES
Discharge instructions reviewed with patient and patients mother. They verbalized understanding and had no further questions. Patient escorted out with nursing staff.

## 2024-12-22 NOTE — DISCHARGE SUMMARY
0.0 - 2.0 %    Immature Granulocytes % 1 0.0 - 5.0 %    Neutrophils Absolute 7.2 1.7 - 8.2 K/UL    Lymphocytes Absolute 0.8 0.5 - 4.6 K/UL    Monocytes Absolute 0.5 0.1 - 1.3 K/UL    Eosinophils Absolute 0.0 0.0 - 0.8 K/UL    Basophils Absolute 0.0 0.0 - 0.2 K/UL    Immature Granulocytes Absolute 0.1 0.0 - 0.5 K/UL   Basic Metabolic Panel w/ Reflex to MG    Collection Time: 12/22/24  5:21 AM   Result Value Ref Range    Sodium 131 (L) 136 - 145 mmol/L    Potassium 3.5 3.5 - 5.1 mmol/L    Chloride 92 (L) 98 - 107 mmol/L    CO2 24 20 - 29 mmol/L    Anion Gap 15 7 - 16 mmol/L    Glucose 96 70 - 99 mg/dL    BUN 6 (L) 8 - 23 MG/DL    Creatinine 0.35 (L) 0.60 - 1.10 MG/DL    Est, Glom Filt Rate >90 >60 ml/min/1.73m2    Calcium 8.0 (L) 8.8 - 10.2 MG/DL   Magnesium    Collection Time: 12/22/24  5:21 AM   Result Value Ref Range    Magnesium 1.6 (L) 1.8 - 2.4 mg/dL   POCT Glucose    Collection Time: 12/22/24  7:58 AM   Result Value Ref Range    POC Glucose 91 65 - 100 mg/dL    Performed by: Shaun        No results for input(s): \"COVID19\" in the last 72 hours.    Recent Vital Data:  Patient Vitals for the past 24 hrs:   Temp Pulse Resp BP SpO2   12/22/24 0756 98.6 °F (37 °C) 100 18 118/63 --   12/22/24 0428 98.4 °F (36.9 °C) 96 16 117/63 93 %   12/22/24 0039 98.2 °F (36.8 °C) 95 24 123/70 92 %   12/21/24 2056 98.1 °F (36.7 °C) 100 20 (!) 125/59 94 %   12/21/24 1634 99.1 °F (37.3 °C) 99 24 (!) 109/58 94 %   12/21/24 1225 98.2 °F (36.8 °C) 97 24 125/69 95 %       Oxygen Therapy  SpO2: 93 %  Pulse via Oximetry: 87 beats per minute  Pulse Oximeter Device Mode: Continuous  Pulse Oximeter Device Location: Finger  O2 Device: None (Room air)  O2 Flow Rate (L/min): 2 L/min    Estimated body mass index is 31.42 kg/m² as calculated from the following:    Height as of this encounter: 1.676 m (5' 6\").    Weight as of this encounter: 88.3 kg (194 lb 10.7 oz).    Intake/Output Summary (Last 24 hours) at 12/22/2024 1018  Last data

## 2024-12-22 NOTE — CARE COORDINATION
Pt is medically cleared for dc to home today.  Therapy recommended STR at dc but pt refused. She also refused HH services.  No other WILL needs or concerns identified or reported.  CM remains available to assist as needed.       12/22/24 8060   Service Assessment   Patient Orientation Alert and Oriented;Person;Place;Situation;Self   Cognition Alert   History Provided By Patient;Medical Record;Child/Family   Primary Caregiver Self   Support Systems Parent;Family Members;[place];Friends/Neighbors   Patient's Healthcare Decision Maker is: Legal Next of Kin   PCP Verified by CM Yes   Last Visit to PCP Within last 6 months   Prior Functional Level Independent in ADLs/IADLs   Current Functional Level Independent in ADLs/IADLs   Can patient return to prior living arrangement Yes   Ability to make needs known: Good   Family able to assist with home care needs: Yes   Would you like for me to discuss the discharge plan with any other family members/significant others, and if so, who? No   Financial Resources Other (Comment)  ([employer] policy)   Community Resources None   Social/Functional History   Lives With Parent   Type of Home House   Home Layout One level   Bathroom Toilet Standard   Bathroom Accessibility Accessible   Home Equipment None   Receives Help From Family   Prior Level of Assist for ADLs Independent   Prior Level of Assist for Homemaking Independent   Ambulation Assistance Independent   Prior Level of Assist for Transfers Independent   Active  Yes   Occupation Retired   Discharge Planning   Type of Residence House   Living Arrangements Parent   Current Services Prior To Admission None   Potential Assistance Needed N/A   DME Ordered? No   Potential Assistance Purchasing Medications No   Type of Home Care Services None   Patient expects to be discharged to: House   Services At/After Discharge   Transition of Care Consult (CM Consult) Discharge Planning   Services At/After Discharge  None    Resource Information Provided? No   Mode of Transport at Discharge Other (see comment)   Confirm Follow Up Transport Family   Condition of Participation: Discharge Planning   The Plan for Transition of Care is related to the following treatment goals: Home with family support to return to baseline functioning.

## 2024-12-22 NOTE — PLAN OF CARE
Problem: Discharge Planning  Goal: Discharge to home or other facility with appropriate resources  12/22/2024 1105 by Dagmar Govea RN  Outcome: Adequate for Discharge  Flowsheets (Taken 12/22/2024 0750)  Discharge to home or other facility with appropriate resources:   Identify barriers to discharge with patient and caregiver   Arrange for needed discharge resources and transportation as appropriate   Identify discharge learning needs (meds, wound care, etc)   Refer to discharge planning if patient needs post-hospital services based on physician order or complex needs related to functional status, cognitive ability or social support system  12/22/2024 0201 by Celio Osman RN  Outcome: Progressing     Problem: Pain  Goal: Verbalizes/displays adequate comfort level or baseline comfort level  12/22/2024 1105 by Dagmar Govea RN  Outcome: Adequate for Discharge  Flowsheets (Taken 12/22/2024 0750)  Verbalizes/displays adequate comfort level or baseline comfort level: Encourage patient to monitor pain and request assistance  12/22/2024 0201 by Celio Osman RN  Outcome: Progressing     Problem: Safety - Adult  Goal: Free from fall injury  12/22/2024 1105 by Dagmar Govea RN  Outcome: Adequate for Discharge  12/22/2024 0201 by Celio Osman RN  Outcome: Progressing     Problem: ABCDS Injury Assessment  Goal: Absence of physical injury  12/22/2024 1105 by Dagmar Govea RN  Outcome: Adequate for Discharge  12/22/2024 0201 by Celio Osman RN  Outcome: Progressing     Problem: Skin/Tissue Integrity  Goal: Absence of new skin breakdown  Description: 1.  Monitor for areas of redness and/or skin breakdown  2.  Assess vascular access sites hourly  3.  Every 4-6 hours minimum:  Change oxygen saturation probe site  4.  Every 4-6 hours:  If on nasal continuous positive airway pressure, respiratory therapy assess nares and determine need for appliance change or resting period.  12/22/2024 1105 by  Dagmar Govea, RN  Outcome: Adequate for Discharge  12/22/2024 0201 by Celio Osman RN  Outcome: Progressing

## 2024-12-23 LAB
BACTERIA SPEC CULT: NORMAL
BACTERIA SPEC CULT: NORMAL
SERVICE CMNT-IMP: NORMAL
SERVICE CMNT-IMP: NORMAL

## 2024-12-24 ENCOUNTER — TELEPHONE (OUTPATIENT)
Age: 64
End: 2024-12-24

## 2024-12-24 NOTE — TELEPHONE ENCOUNTER
Someone called for pt and LVM on referral line that pt was told to call our office and schedule hosp FU with Dr Curtis, however pt is too sick at this time.   Asked for call back, but did not leave phone #.   Forwarding to nurse to FU with pt.

## 2024-12-24 NOTE — TELEPHONE ENCOUNTER
Reviewed pt's chart with Dr. Curtis. Per Dr. Curtis, no hospital follow-up appt required/recommended. Pt should follow up with PCP as planned. Per Dr. Curtis, EGD biopsy results reviewed by provider, overall unremarkable, non-concerning result that warrants no further action.     Returned call to pt as requested. No answer, LVM informing pt that no follow-up appt is needed per Dr. Curtis, pt should plan to follow up with PCP as scheduled. Informed pt of overall unremarkable EGD biopsy result per provider and instructed pt to call back with any further questions.

## 2024-12-27 NOTE — ED NOTES
Attempt made with case management to contact pt post discharge to inquire about follow up care. Unable to reach pt. VM left.      Sonia Goudl, RN  12/27/24 4224

## 2025-01-02 ENCOUNTER — APPOINTMENT (OUTPATIENT)
Dept: ULTRASOUND IMAGING | Age: 65
DRG: 391 | End: 2025-01-02
Payer: COMMERCIAL

## 2025-01-02 ENCOUNTER — HOSPITAL ENCOUNTER (INPATIENT)
Age: 65
LOS: 4 days | Discharge: HOME | DRG: 391 | End: 2025-01-06
Attending: EMERGENCY MEDICINE | Admitting: FAMILY MEDICINE
Payer: COMMERCIAL

## 2025-01-02 ENCOUNTER — OFFICE VISIT (OUTPATIENT)
Dept: SURGERY | Age: 65
End: 2025-01-02
Payer: COMMERCIAL

## 2025-01-02 ENCOUNTER — PREP FOR PROCEDURE (OUTPATIENT)
Dept: SURGERY | Age: 65
End: 2025-01-02

## 2025-01-02 VITALS — WEIGHT: 194 LBS | HEIGHT: 66 IN | BODY MASS INDEX: 31.18 KG/M2

## 2025-01-02 DIAGNOSIS — R94.8 ABNORMAL POSITRON EMISSION TOMOGRAPHY (PET) OF COLON: ICD-10-CM

## 2025-01-02 DIAGNOSIS — E86.0 DEHYDRATION: ICD-10-CM

## 2025-01-02 DIAGNOSIS — N63.10 LARGE MASS OF RIGHT BREAST: ICD-10-CM

## 2025-01-02 DIAGNOSIS — E87.29 HIGH ANION GAP METABOLIC ACIDOSIS: ICD-10-CM

## 2025-01-02 DIAGNOSIS — Z85.3 PERSONAL HISTORY OF MALIGNANT PHYLLOIDES TUMOR OF BREAST: Primary | ICD-10-CM

## 2025-01-02 DIAGNOSIS — R11.2 INTRACTABLE NAUSEA AND VOMITING: Primary | ICD-10-CM

## 2025-01-02 PROBLEM — D72.829 LEUKOCYTOSIS: Status: ACTIVE | Noted: 2025-01-02

## 2025-01-02 PROBLEM — C50.911: Status: ACTIVE | Noted: 2024-12-18

## 2025-01-02 PROBLEM — R73.9 HYPERGLYCEMIA: Status: ACTIVE | Noted: 2025-01-02

## 2025-01-02 PROBLEM — R63.4 UNINTENTIONAL WEIGHT LOSS: Status: ACTIVE | Noted: 2025-01-02

## 2025-01-02 PROBLEM — E46 HYPOALBUMINEMIA DUE TO PROTEIN-CALORIE MALNUTRITION (HCC): Status: ACTIVE | Noted: 2025-01-02

## 2025-01-02 PROBLEM — E88.09 HYPOALBUMINEMIA DUE TO PROTEIN-CALORIE MALNUTRITION (HCC): Status: ACTIVE | Noted: 2025-01-02

## 2025-01-02 LAB
ALBUMIN SERPL-MCNC: 1.9 G/DL (ref 3.2–4.6)
ALBUMIN/GLOB SERPL: 0.3 (ref 1–1.9)
ALP SERPL-CCNC: 102 U/L (ref 35–104)
ALT SERPL-CCNC: 13 U/L (ref 8–45)
ANION GAP SERPL CALC-SCNC: 25 MMOL/L (ref 7–16)
APPEARANCE UR: ABNORMAL
AST SERPL-CCNC: 50 U/L (ref 15–37)
BACTERIA URNS QL MICRO: ABNORMAL /HPF
BASOPHILS # BLD: 0 K/UL (ref 0–0.2)
BASOPHILS NFR BLD: 0 % (ref 0–2)
BILIRUB SERPL-MCNC: 0.9 MG/DL (ref 0–1.2)
BILIRUB UR QL: ABNORMAL
BUN SERPL-MCNC: 14 MG/DL (ref 8–23)
CALCIUM SERPL-MCNC: 9.1 MG/DL (ref 8.8–10.2)
CASTS URNS QL MICRO: ABNORMAL /LPF
CHLORIDE SERPL-SCNC: 90 MMOL/L (ref 98–107)
CO2 SERPL-SCNC: 21 MMOL/L (ref 20–29)
COLOR UR: ABNORMAL
CREAT SERPL-MCNC: 0.42 MG/DL (ref 0.6–1.1)
DIFFERENTIAL METHOD BLD: ABNORMAL
EOSINOPHIL # BLD: 0 K/UL (ref 0–0.8)
EOSINOPHIL NFR BLD: 0 % (ref 0.5–7.8)
EPI CELLS #/AREA URNS HPF: ABNORMAL /HPF
ERYTHROCYTE [DISTWIDTH] IN BLOOD BY AUTOMATED COUNT: 14.4 % (ref 11.9–14.6)
GLOBULIN SER CALC-MCNC: 5.5 G/DL (ref 2.3–3.5)
GLUCOSE SERPL-MCNC: 139 MG/DL (ref 70–99)
GLUCOSE UR STRIP.AUTO-MCNC: NEGATIVE MG/DL
HCT VFR BLD AUTO: 32.7 % (ref 35.8–46.3)
HGB BLD-MCNC: 10.4 G/DL (ref 11.7–15.4)
HGB UR QL STRIP: ABNORMAL
IMM GRANULOCYTES # BLD AUTO: 0.1 K/UL (ref 0–0.5)
IMM GRANULOCYTES NFR BLD AUTO: 1 % (ref 0–5)
KETONES UR QL STRIP.AUTO: >80 MG/DL
LEUKOCYTE ESTERASE UR QL STRIP.AUTO: ABNORMAL
LIPASE SERPL-CCNC: 18 U/L (ref 13–60)
LYMPHOCYTES # BLD: 0.8 K/UL (ref 0.5–4.6)
LYMPHOCYTES NFR BLD: 8 % (ref 13–44)
MCH RBC QN AUTO: 28.1 PG (ref 26.1–32.9)
MCHC RBC AUTO-ENTMCNC: 31.8 G/DL (ref 31.4–35)
MCV RBC AUTO: 88.4 FL (ref 82–102)
MONOCYTES # BLD: 0.4 K/UL (ref 0.1–1.3)
MONOCYTES NFR BLD: 4 % (ref 4–12)
NEUTS SEG # BLD: 9.7 K/UL (ref 1.7–8.2)
NEUTS SEG NFR BLD: 87 % (ref 43–78)
NITRITE UR QL STRIP.AUTO: NEGATIVE
NRBC # BLD: 0 K/UL (ref 0–0.2)
OTHER OBSERVATIONS: ABNORMAL
PH UR STRIP: 6.5 (ref 5–9)
PLATELET # BLD AUTO: 358 K/UL (ref 150–450)
PMV BLD AUTO: 9.4 FL (ref 9.4–12.3)
POTASSIUM SERPL-SCNC: 3.7 MMOL/L (ref 3.5–5.1)
PROT SERPL-MCNC: 7.4 G/DL (ref 6.3–8.2)
PROT UR STRIP-MCNC: 100 MG/DL
RBC # BLD AUTO: 3.7 M/UL (ref 4.05–5.2)
RBC #/AREA URNS HPF: ABNORMAL /HPF
SODIUM SERPL-SCNC: 136 MMOL/L (ref 136–145)
SP GR UR REFRACTOMETRY: 1.03 (ref 1–1.02)
UROBILINOGEN UR QL STRIP.AUTO: 2 EU/DL (ref 0.2–1)
WBC # BLD AUTO: 11.1 K/UL (ref 4.3–11.1)
WBC URNS QL MICRO: ABNORMAL /HPF

## 2025-01-02 PROCEDURE — 81001 URINALYSIS AUTO W/SCOPE: CPT

## 2025-01-02 PROCEDURE — 96374 THER/PROPH/DIAG INJ IV PUSH: CPT

## 2025-01-02 PROCEDURE — 1100000000 HC RM PRIVATE

## 2025-01-02 PROCEDURE — 2580000003 HC RX 258: Performed by: EMERGENCY MEDICINE

## 2025-01-02 PROCEDURE — 87086 URINE CULTURE/COLONY COUNT: CPT

## 2025-01-02 PROCEDURE — 85025 COMPLETE CBC W/AUTO DIFF WBC: CPT

## 2025-01-02 PROCEDURE — 99214 OFFICE O/P EST MOD 30 MIN: CPT | Performed by: SURGERY

## 2025-01-02 PROCEDURE — 96361 HYDRATE IV INFUSION ADD-ON: CPT

## 2025-01-02 PROCEDURE — 6360000002 HC RX W HCPCS: Performed by: EMERGENCY MEDICINE

## 2025-01-02 PROCEDURE — 80053 COMPREHEN METABOLIC PANEL: CPT

## 2025-01-02 PROCEDURE — 0202U NFCT DS 22 TRGT SARS-COV-2: CPT

## 2025-01-02 PROCEDURE — 96375 TX/PRO/DX INJ NEW DRUG ADDON: CPT

## 2025-01-02 PROCEDURE — 76641 ULTRASOUND BREAST COMPLETE: CPT

## 2025-01-02 PROCEDURE — 83690 ASSAY OF LIPASE: CPT

## 2025-01-02 PROCEDURE — 99285 EMERGENCY DEPT VISIT HI MDM: CPT

## 2025-01-02 RX ORDER — POTASSIUM CHLORIDE 1500 MG/1
40 TABLET, EXTENDED RELEASE ORAL PRN
Status: DISCONTINUED | OUTPATIENT
Start: 2025-01-02 | End: 2025-01-03

## 2025-01-02 RX ORDER — SODIUM CHLORIDE 0.9 % (FLUSH) 0.9 %
5-40 SYRINGE (ML) INJECTION PRN
Status: DISCONTINUED | OUTPATIENT
Start: 2025-01-02 | End: 2025-01-06 | Stop reason: HOSPADM

## 2025-01-02 RX ORDER — DEXTROSE MONOHYDRATE, SODIUM CHLORIDE, AND POTASSIUM CHLORIDE 50; 1.49; 4.5 G/1000ML; G/1000ML; G/1000ML
INJECTION, SOLUTION INTRAVENOUS CONTINUOUS
Status: DISPENSED | OUTPATIENT
Start: 2025-01-03 | End: 2025-01-03

## 2025-01-02 RX ORDER — METOCLOPRAMIDE HYDROCHLORIDE 5 MG/ML
10 INJECTION INTRAMUSCULAR; INTRAVENOUS ONCE
Status: COMPLETED | OUTPATIENT
Start: 2025-01-02 | End: 2025-01-02

## 2025-01-02 RX ORDER — 0.9 % SODIUM CHLORIDE 0.9 %
1000 INTRAVENOUS SOLUTION INTRAVENOUS ONCE
Status: COMPLETED | OUTPATIENT
Start: 2025-01-02 | End: 2025-01-02

## 2025-01-02 RX ORDER — ACETAMINOPHEN 650 MG/1
650 SUPPOSITORY RECTAL EVERY 6 HOURS PRN
Status: DISCONTINUED | OUTPATIENT
Start: 2025-01-02 | End: 2025-01-06 | Stop reason: HOSPADM

## 2025-01-02 RX ORDER — ENOXAPARIN SODIUM 100 MG/ML
40 INJECTION SUBCUTANEOUS DAILY
Status: DISCONTINUED | OUTPATIENT
Start: 2025-01-03 | End: 2025-01-06 | Stop reason: HOSPADM

## 2025-01-02 RX ORDER — SODIUM CHLORIDE 0.9 % (FLUSH) 0.9 %
5-40 SYRINGE (ML) INJECTION EVERY 12 HOURS SCHEDULED
Status: CANCELLED | OUTPATIENT
Start: 2025-01-02

## 2025-01-02 RX ORDER — POTASSIUM CHLORIDE 7.45 MG/ML
10 INJECTION INTRAVENOUS PRN
Status: DISCONTINUED | OUTPATIENT
Start: 2025-01-02 | End: 2025-01-03

## 2025-01-02 RX ORDER — MAGNESIUM SULFATE IN WATER 40 MG/ML
2000 INJECTION, SOLUTION INTRAVENOUS PRN
Status: DISCONTINUED | OUTPATIENT
Start: 2025-01-02 | End: 2025-01-06 | Stop reason: HOSPADM

## 2025-01-02 RX ORDER — METOCLOPRAMIDE HYDROCHLORIDE 5 MG/ML
10 INJECTION INTRAMUSCULAR; INTRAVENOUS EVERY 6 HOURS
Status: DISCONTINUED | OUTPATIENT
Start: 2025-01-03 | End: 2025-01-06 | Stop reason: HOSPADM

## 2025-01-02 RX ORDER — BISACODYL 10 MG
10 SUPPOSITORY, RECTAL RECTAL DAILY PRN
Status: DISCONTINUED | OUTPATIENT
Start: 2025-01-02 | End: 2025-01-06 | Stop reason: HOSPADM

## 2025-01-02 RX ORDER — SODIUM CHLORIDE 0.9 % (FLUSH) 0.9 %
5-40 SYRINGE (ML) INJECTION PRN
Status: CANCELLED | OUTPATIENT
Start: 2025-01-02

## 2025-01-02 RX ORDER — SODIUM CHLORIDE 9 MG/ML
INJECTION, SOLUTION INTRAVENOUS PRN
Status: DISCONTINUED | OUTPATIENT
Start: 2025-01-02 | End: 2025-01-06 | Stop reason: HOSPADM

## 2025-01-02 RX ORDER — SODIUM CHLORIDE 9 MG/ML
INJECTION, SOLUTION INTRAVENOUS PRN
Status: CANCELLED | OUTPATIENT
Start: 2025-01-02

## 2025-01-02 RX ORDER — SODIUM CHLORIDE 0.9 % (FLUSH) 0.9 %
5-40 SYRINGE (ML) INJECTION EVERY 12 HOURS SCHEDULED
Status: DISCONTINUED | OUTPATIENT
Start: 2025-01-03 | End: 2025-01-06 | Stop reason: HOSPADM

## 2025-01-02 RX ORDER — ONDANSETRON 2 MG/ML
4 INJECTION INTRAMUSCULAR; INTRAVENOUS ONCE
Status: COMPLETED | OUTPATIENT
Start: 2025-01-02 | End: 2025-01-02

## 2025-01-02 RX ADMIN — SODIUM CHLORIDE 1000 ML: 9 INJECTION, SOLUTION INTRAVENOUS at 17:19

## 2025-01-02 RX ADMIN — ONDANSETRON 4 MG: 2 INJECTION, SOLUTION INTRAMUSCULAR; INTRAVENOUS at 20:34

## 2025-01-02 RX ADMIN — METOCLOPRAMIDE 10 MG: 5 INJECTION, SOLUTION INTRAMUSCULAR; INTRAVENOUS at 21:30

## 2025-01-02 ASSESSMENT — PAIN - FUNCTIONAL ASSESSMENT: PAIN_FUNCTIONAL_ASSESSMENT: NONE - DENIES PAIN

## 2025-01-02 ASSESSMENT — LIFESTYLE VARIABLES
HOW MANY STANDARD DRINKS CONTAINING ALCOHOL DO YOU HAVE ON A TYPICAL DAY: PATIENT DOES NOT DRINK
HOW OFTEN DO YOU HAVE A DRINK CONTAINING ALCOHOL: NEVER

## 2025-01-02 ASSESSMENT — PAIN SCALES - GENERAL: PAINLEVEL_OUTOF10: 0

## 2025-01-02 NOTE — ED PROVIDER NOTES
Emergency Department Provider Note       PCP: Sara Newell APRN - NP   Age: 64 y.o.   Sex: female     DISPOSITION Decision To Admit 01/02/2025 09:05:50 PM    ICD-10-CM    1. Intractable nausea and vomiting  R11.2       2. High anion gap metabolic acidosis  E87.29       3. Dehydration  E86.0           Medical Decision Making     Patient being evaluated for intractable nausea and vomiting.  She looks dry on physical exam so has been ordered IV fluids.  This seems to be an ongoing issue and has had imaging, EGD, another workups performed as discovered in chart review.  Portable performed look for any metabolic, lecture light, or other abnormalities in regards to her nausea and vomiting.  She also has had increasing redness and swelling of her right breast after having recent biopsies.  Ultrasound will be performed to look for any signs of underlying abscess.  ED Course as of 01/02/25 2107   Thu Jan 02, 2025   1709 Blood work does not show leukocytosis.  She has normal renal function.  She does have a high anion gap acidosis with anion gap of 25. [SILVANO]   2044 The patient's heart rate and blood pressure is improving with IV fluids.  She has had further episodes of vomiting here despite Zofran so I have ordered Reglan. [SILVANO]   2105 Patient's urinalysis negative for infection but does show greater than 80 ketones.  This would be consistent with dehydration.  With the patient's intractable nausea and vomiting, metabolic acidosis and signs of dehydration I have asked the hospitalist for admission. [SILVANO]      ED Course User Index  [SILVANO] Darren Edwards, DO     1 or more acute illnesses that pose a threat to life or bodily function.   Discussion with external consultants.  Shared medical decision making was utilized in creating the patients health plan today.  I independently ordered and reviewed each unique test.    I reviewed external records: provider visit note from outside specialist.  I reviewed external

## 2025-01-02 NOTE — PROGRESS NOTES
H&P/Consult Note/Progress Note/Office Note:   July Kerr  MRN: [id number]  :1960  Age:64 y.o.    HPI: July Kerr is a 64 y.o. female who came to my office for her first visit on 24 with a massive right breast neoplasm.    She reported it was growing for several years  She recently went in for imaging which is shown below.    She has large pendulous breasts and the right breast mass is massive and feels the majority of the breast.        Maternal grandmother with history of breast cancer.    She did not come in for her office visit on 24--->I tried to call her but  no answer (left a generic message with no PHI)  She returned on 1/2/25        10/24/24 Bilat Dx mammo and right breast US  Hx: Palpable mass in the right breast  Estimated lifetime risk of breast cancer calculated to be 12.03% based on the Tyrer-Cuzick model, which is considered average risk.     MAMMOGRAM:  Tissue density: The breasts are heterogeneously dense, which may obscure small masses. (#BDC)     Within the right breast, there is a large partially calcified mass measuring at least 18 cm. This occupies nearly the entire breast.   A few benign coarse calcifications are scattered in the right breast.     In the left breast, there is dense heterogeneous nodular tissue with scattered densities but no evidence of a focal mass or distortion.   There are no suspicious calcifications. There are scattered benign coarse calcifications.     US:  US of the right breast shows a cystic and solid mass occupying the entire breast measured at 13 x 11 x 13 cm, although the entirety of the lesion  was not visualized. There is internal blood flow. A lymph node in the low axilla shows a prominent cortex measuring 4-5 mm.     IMPRESSION:  Indeterminate complex mass in the right breast, measuring about 18 cm on mammogram.  Prominent right axillary lymph node.     No suspicious findings in the left breast on mammogram.

## 2025-01-03 ENCOUNTER — APPOINTMENT (OUTPATIENT)
Dept: GENERAL RADIOLOGY | Age: 65
DRG: 391 | End: 2025-01-03
Payer: COMMERCIAL

## 2025-01-03 DIAGNOSIS — R94.8 ABNORMAL POSITRON EMISSION TOMOGRAPHY (PET) OF COLON: Primary | ICD-10-CM

## 2025-01-03 LAB
ALBUMIN SERPL-MCNC: 1.5 G/DL (ref 3.2–4.6)
ALBUMIN/GLOB SERPL: 0.3 (ref 1–1.9)
ALP SERPL-CCNC: 80 U/L (ref 35–104)
ALT SERPL-CCNC: 9 U/L (ref 8–45)
ANION GAP SERPL CALC-SCNC: 14 MMOL/L (ref 7–16)
AST SERPL-CCNC: 39 U/L (ref 15–37)
B PERT DNA SPEC QL NAA+PROBE: NOT DETECTED
BASOPHILS # BLD: 0 K/UL (ref 0–0.2)
BASOPHILS NFR BLD: 0 % (ref 0–2)
BILIRUB SERPL-MCNC: 0.6 MG/DL (ref 0–1.2)
BORDETELLA PARAPERTUSSIS BY PCR: NOT DETECTED
BUN SERPL-MCNC: 12 MG/DL (ref 8–23)
C PNEUM DNA SPEC QL NAA+PROBE: NOT DETECTED
CALCIUM SERPL-MCNC: 8.3 MG/DL (ref 8.8–10.2)
CHLORIDE SERPL-SCNC: 96 MMOL/L (ref 98–107)
CO2 SERPL-SCNC: 27 MMOL/L (ref 20–29)
CREAT SERPL-MCNC: 0.29 MG/DL (ref 0.6–1.1)
CRP SERPL-MCNC: 31.1 MG/DL (ref 0–0.4)
DIFFERENTIAL METHOD BLD: ABNORMAL
EOSINOPHIL # BLD: 0 K/UL (ref 0–0.8)
EOSINOPHIL NFR BLD: 0 % (ref 0.5–7.8)
ERYTHROCYTE [DISTWIDTH] IN BLOOD BY AUTOMATED COUNT: 14.5 % (ref 11.9–14.6)
ERYTHROCYTE [SEDIMENTATION RATE] IN BLOOD: 67 MM/HR (ref 0–30)
FLUAV SUBTYP SPEC NAA+PROBE: NOT DETECTED
FLUBV RNA SPEC QL NAA+PROBE: NOT DETECTED
GLOBULIN SER CALC-MCNC: 4.5 G/DL (ref 2.3–3.5)
GLUCOSE SERPL-MCNC: 156 MG/DL (ref 70–99)
HADV DNA SPEC QL NAA+PROBE: NOT DETECTED
HCOV 229E RNA SPEC QL NAA+PROBE: NOT DETECTED
HCOV HKU1 RNA SPEC QL NAA+PROBE: NOT DETECTED
HCOV NL63 RNA SPEC QL NAA+PROBE: NOT DETECTED
HCOV OC43 RNA SPEC QL NAA+PROBE: NOT DETECTED
HCT VFR BLD AUTO: 26.4 % (ref 35.8–46.3)
HGB BLD-MCNC: 8.4 G/DL (ref 11.7–15.4)
HMPV RNA SPEC QL NAA+PROBE: NOT DETECTED
HPIV1 RNA SPEC QL NAA+PROBE: NOT DETECTED
HPIV2 RNA SPEC QL NAA+PROBE: NOT DETECTED
HPIV3 RNA SPEC QL NAA+PROBE: NOT DETECTED
HPIV4 RNA SPEC QL NAA+PROBE: NOT DETECTED
IMM GRANULOCYTES # BLD AUTO: 0.1 K/UL (ref 0–0.5)
IMM GRANULOCYTES NFR BLD AUTO: 1 % (ref 0–5)
LYMPHOCYTES # BLD: 0.8 K/UL (ref 0.5–4.6)
LYMPHOCYTES NFR BLD: 12 % (ref 13–44)
M PNEUMO DNA SPEC QL NAA+PROBE: NOT DETECTED
MAGNESIUM SERPL-MCNC: 1.7 MG/DL (ref 1.8–2.4)
MCH RBC QN AUTO: 27.5 PG (ref 26.1–32.9)
MCHC RBC AUTO-ENTMCNC: 31.8 G/DL (ref 31.4–35)
MCV RBC AUTO: 86.3 FL (ref 82–102)
MONOCYTES # BLD: 0.3 K/UL (ref 0.1–1.3)
MONOCYTES NFR BLD: 5 % (ref 4–12)
NEUTS SEG # BLD: 5.3 K/UL (ref 1.7–8.2)
NEUTS SEG NFR BLD: 82 % (ref 43–78)
NRBC # BLD: 0 K/UL (ref 0–0.2)
PHOSPHATE SERPL-MCNC: 1.7 MG/DL (ref 2.5–4.5)
PHOSPHATE SERPL-MCNC: 2.6 MG/DL (ref 2.5–4.5)
PLATELET # BLD AUTO: 205 K/UL (ref 150–450)
PMV BLD AUTO: 8.9 FL (ref 9.4–12.3)
POTASSIUM SERPL-SCNC: 2.7 MMOL/L (ref 3.5–5.1)
PROT SERPL-MCNC: 6 G/DL (ref 6.3–8.2)
RBC # BLD AUTO: 3.06 M/UL (ref 4.05–5.2)
RSV RNA SPEC QL NAA+PROBE: NOT DETECTED
RV+EV RNA SPEC QL NAA+PROBE: NOT DETECTED
SARS-COV-2 RNA RESP QL NAA+PROBE: NOT DETECTED
SODIUM SERPL-SCNC: 137 MMOL/L (ref 136–145)
WBC # BLD AUTO: 6.5 K/UL (ref 4.3–11.1)

## 2025-01-03 PROCEDURE — 2500000003 HC RX 250 WO HCPCS: Performed by: FAMILY MEDICINE

## 2025-01-03 PROCEDURE — 74018 RADEX ABDOMEN 1 VIEW: CPT

## 2025-01-03 PROCEDURE — 1100000000 HC RM PRIVATE

## 2025-01-03 PROCEDURE — 2580000003 HC RX 258: Performed by: NURSE PRACTITIONER

## 2025-01-03 PROCEDURE — 2500000003 HC RX 250 WO HCPCS: Performed by: NURSE PRACTITIONER

## 2025-01-03 PROCEDURE — 97165 OT EVAL LOW COMPLEX 30 MIN: CPT

## 2025-01-03 PROCEDURE — 76937 US GUIDE VASCULAR ACCESS: CPT

## 2025-01-03 PROCEDURE — 86140 C-REACTIVE PROTEIN: CPT

## 2025-01-03 PROCEDURE — 97112 NEUROMUSCULAR REEDUCATION: CPT

## 2025-01-03 PROCEDURE — 84100 ASSAY OF PHOSPHORUS: CPT

## 2025-01-03 PROCEDURE — 2580000003 HC RX 258: Performed by: FAMILY MEDICINE

## 2025-01-03 PROCEDURE — 97161 PT EVAL LOW COMPLEX 20 MIN: CPT

## 2025-01-03 PROCEDURE — 6360000002 HC RX W HCPCS: Performed by: FAMILY MEDICINE

## 2025-01-03 PROCEDURE — 6360000002 HC RX W HCPCS: Performed by: NURSE PRACTITIONER

## 2025-01-03 PROCEDURE — 83735 ASSAY OF MAGNESIUM: CPT

## 2025-01-03 PROCEDURE — 36415 COLL VENOUS BLD VENIPUNCTURE: CPT

## 2025-01-03 PROCEDURE — 85025 COMPLETE CBC W/AUTO DIFF WBC: CPT

## 2025-01-03 PROCEDURE — 80053 COMPREHEN METABOLIC PANEL: CPT

## 2025-01-03 PROCEDURE — 99253 IP/OBS CNSLTJ NEW/EST LOW 45: CPT | Performed by: INTERNAL MEDICINE

## 2025-01-03 PROCEDURE — 85652 RBC SED RATE AUTOMATED: CPT

## 2025-01-03 PROCEDURE — 6370000000 HC RX 637 (ALT 250 FOR IP): Performed by: NURSE PRACTITIONER

## 2025-01-03 PROCEDURE — 97535 SELF CARE MNGMENT TRAINING: CPT

## 2025-01-03 PROCEDURE — 97530 THERAPEUTIC ACTIVITIES: CPT

## 2025-01-03 RX ORDER — MAGNESIUM SULFATE IN WATER 40 MG/ML
2000 INJECTION, SOLUTION INTRAVENOUS ONCE
Status: COMPLETED | OUTPATIENT
Start: 2025-01-03 | End: 2025-01-03

## 2025-01-03 RX ORDER — POTASSIUM CHLORIDE 7.45 MG/ML
10 INJECTION INTRAVENOUS PRN
Status: DISCONTINUED | OUTPATIENT
Start: 2025-01-03 | End: 2025-01-04

## 2025-01-03 RX ORDER — POTASSIUM CHLORIDE 29.8 MG/ML
20 INJECTION INTRAVENOUS PRN
Status: DISCONTINUED | OUTPATIENT
Start: 2025-01-03 | End: 2025-01-04

## 2025-01-03 RX ORDER — FLUCONAZOLE 100 MG/1
150 TABLET ORAL DAILY
Status: COMPLETED | OUTPATIENT
Start: 2025-01-03 | End: 2025-01-05

## 2025-01-03 RX ADMIN — METOCLOPRAMIDE 10 MG: 5 INJECTION, SOLUTION INTRAMUSCULAR; INTRAVENOUS at 11:17

## 2025-01-03 RX ADMIN — SODIUM PHOSPHATE, MONOBASIC, MONOHYDRATE AND SODIUM PHOSPHATE, DIBASIC, ANHYDROUS 20 MMOL: 276; 142 INJECTION, SOLUTION INTRAVENOUS at 14:59

## 2025-01-03 RX ADMIN — POTASSIUM CHLORIDE 10 MEQ: 7.45 INJECTION INTRAVENOUS at 06:48

## 2025-01-03 RX ADMIN — I.V. FAT EMULSION 250 ML: 20 EMULSION INTRAVENOUS at 18:14

## 2025-01-03 RX ADMIN — POTASSIUM CHLORIDE: 2 INJECTION, SOLUTION, CONCENTRATE INTRAVENOUS at 18:14

## 2025-01-03 RX ADMIN — POTASSIUM CHLORIDE 10 MEQ: 7.45 INJECTION INTRAVENOUS at 11:17

## 2025-01-03 RX ADMIN — POTASSIUM CHLORIDE, DEXTROSE MONOHYDRATE AND SODIUM CHLORIDE: 150; 5; 450 INJECTION, SOLUTION INTRAVENOUS at 00:28

## 2025-01-03 RX ADMIN — METOCLOPRAMIDE 10 MG: 5 INJECTION, SOLUTION INTRAMUSCULAR; INTRAVENOUS at 17:08

## 2025-01-03 RX ADMIN — METOCLOPRAMIDE 10 MG: 5 INJECTION, SOLUTION INTRAMUSCULAR; INTRAVENOUS at 05:34

## 2025-01-03 RX ADMIN — POTASSIUM CHLORIDE 10 MEQ: 7.45 INJECTION INTRAVENOUS at 08:37

## 2025-01-03 RX ADMIN — MAGNESIUM SULFATE HEPTAHYDRATE 2000 MG: 40 INJECTION, SOLUTION INTRAVENOUS at 11:25

## 2025-01-03 RX ADMIN — SODIUM CHLORIDE, PRESERVATIVE FREE 10 ML: 5 INJECTION INTRAVENOUS at 07:45

## 2025-01-03 RX ADMIN — SODIUM CHLORIDE 40 MG: 9 INJECTION INTRAMUSCULAR; INTRAVENOUS; SUBCUTANEOUS at 05:34

## 2025-01-03 RX ADMIN — FLUCONAZOLE 150 MG: 100 TABLET ORAL at 17:04

## 2025-01-03 RX ADMIN — METOCLOPRAMIDE 10 MG: 5 INJECTION, SOLUTION INTRAMUSCULAR; INTRAVENOUS at 00:26

## 2025-01-03 RX ADMIN — SODIUM CHLORIDE 40 MG: 9 INJECTION INTRAMUSCULAR; INTRAVENOUS; SUBCUTANEOUS at 17:12

## 2025-01-03 RX ADMIN — POTASSIUM CHLORIDE, DEXTROSE MONOHYDRATE AND SODIUM CHLORIDE: 150; 5; 450 INJECTION, SOLUTION INTRAVENOUS at 08:35

## 2025-01-03 RX ADMIN — ANTI-FUNGAL POWDER MICONAZOLE NITRATE TALC FREE: 1.42 POWDER TOPICAL at 22:03

## 2025-01-03 RX ADMIN — SODIUM CHLORIDE, PRESERVATIVE FREE 10 ML: 5 INJECTION INTRAVENOUS at 22:03

## 2025-01-03 ASSESSMENT — PAIN SCALES - GENERAL
PAINLEVEL_OUTOF10: 0

## 2025-01-03 NOTE — CONSULTS
General Surgery consult completed by Dr Cabral 1/2/25.  
Nutrition Assessment  Assessment Type: Initial, Positive nutrition screen, Consult  Reason for visit:  Best Practice Alert: Malnutrition Screening Tool  and Parenteral Nutrition Management (Hospitalists)  Malnutrition Screening Tool Score: 3    Nutrition Intervention:   Food and/or Nutrient Delivery:   Parenteral Nutrition:  Peripheral parenteral nutrition (Osmolarity 887)   Peripheral line infusion  Initiate: Dex 5%, 4.25% AA 1.56 L (65ml/hr)   Initiate 250 ml 20% lipids daily  Electrolytes:   Sodium ( 70 mEq/L sodium chloride), Potassium ( 20 mmol/L potassium phosphate, 10 mEq/L potassium chloride), Calcium gluconate (4.5 mEq/L), Magnesium sulfate 8 mEq/L   Additives:   Adult multivitamin with vit K  Trace Elements  Thiamine 100 mg daily x 7 days (EOT 1/9)  Folic Acid 1 mg daily x 7 days (EOT 1/9)  PN regimen provides per 24 hours: 1030 kcal/day (70% of needs), 78 grams of protein/day (100% of needs), 78 grams of CHO/day and 1560 ml of total volume/day.   Above regimen: Initiation regimen not intended to meet macronutrient needs  Labs:   Basic Metabolic Panel, Hepatic Function Panel, Magnesium and Phosphorus active per nutrition parameters  Triglyceride tomorrow  POC Glucoses/SSI Not indicated  Nutrition Related Medication Management:  Electrolyte Replacement:   Initiate prn protocol Phosphorus; continue for Mg; adjust K for IV  Initiating phos replacement protocol d/t high refeeding risk likely unable to support electrolytes with PPN alone  20 mmol NaPhos and 2 g MgSulfate today per discussion with KIRBY Coats.  Recheck Phos after repletion    Intravenous fluids:  Discontinue at 1800 with PN start  Meals and Snacks:  Diet: Continue current order  Medical Food Supplements:   Medical food supplement therapy:  Initiate Ensure Clear (clear liquid oral supplement) 240 calories, 8 grams protein per 8 ounce serving  Zero bed at next ambulation   Coordination of Nutrition Care:  Coordination with lina care provider 
25  0531   WBC 11.1 6.5   RBC 3.70* 3.06*   HGB 10.4* 8.4*   HCT 32.7* 26.4*   MCV 88.4 86.3   RDW 14.4 14.5    205     CHEMISTRIES:  Recent Labs     25  1602 25  0531    137   K 3.7 2.7*   CL 90* 96*   CO2 21 27   BUN 14 12   CREATININE 0.42* 0.29*   GLUCOSE 139* 156*   PHOS  --  1.7*   MG  --  1.7*     PT/INR:No results for input(s): \"PROTIME\", \"INR\" in the last 72 hours.  APTT:No results for input(s): \"APTT\" in the last 72 hours.  LIVER PROFILE:  Recent Labs     25  1602 25  0531   AST 50* 39*   ALT 13 9   BILITOT 0.9 0.6   ALKPHOS 102 80       Imaging/Diagnostics   US BREAST COMPLETE RIGHT    Result Date: 1/3/2025  A heterogeneous phyllodes tumor is again seen in the upper right breast. Complex fluid within the mass is indeterminate and infected fluid is not excluded. RECOMMENDATION:  Clinical follow-up. BI-RADS Assessment Category 6: Known Biopsy Proven Malignancy. 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 Chronic nausea and vomitin yo female with right breast cancer who has had ongoing nausea and vomiting since late 2024. Admitted recently with this issue and no reasoning was found. Has had significant weight loss and unable to keep anything but water down but does vomit that back up about 20-30 min later. Spoke with dietician who states they will start her on PPN tonight. Will discuss case with Dr. Chaudhari.     Electronically signed by YARON Bender - CNP on 1/3/25 at 11:08 AM EST

## 2025-01-03 NOTE — ED NOTES
TRANSFER - OUT REPORT:    Verbal report given to RN on July Kerr  being transferred to [facility] for routine progression of patient care       Report consisted of patient's Situation, Background, Assessment and   Recommendations(SBAR).     Information from the following report(s) Nurse Handoff Report, ED SBAR, Adult Overview, MAR, and Recent Results was reviewed with the receiving nurse.    [place] Assessment:    Presents to emergency department  because of falls (Syncope, seizure, or loss of consciousness): No  Age > 70: No  Altered Mental Status, Intoxication with alcohol or substance confusion (Disorientation, impaired judgment, poor safety awaremess, or inability to follow instructions): No  Impaired Mobility: Ambulates or transfers with assistive devices or assistance; Unable to ambulate or transer.: Yes  Nursing Judgement: Yes          Lines:   Peripheral IV 01/02/25 Left Wrist (Active)   Site Assessment Clean, dry & intact 01/02/25 2038   Line Status Blood return noted;Normal saline locked;Flushed 01/02/25 2038   Line Care Connections checked and tightened 01/02/25 2038   Phlebitis Assessment No symptoms 01/02/25 2038   Infiltration Assessment 0 01/02/25 2038   Alcohol Cap Used Yes 01/02/25 2038   Dressing Status Clean, dry & intact 01/02/25 2038   Dressing Type Transparent 01/02/25 2038        Opportunity for questions and clarification was provided.      Patient transported with:  Registered Nurse          Jules, Negin, RN  01/02/25 6275

## 2025-01-03 NOTE — ACP (ADVANCE CARE PLANNING)
Advance Care Planning   Healthcare Decision Maker:    Primary Decision Maker: JOSE LUIS GALLEGOS - Parent - [phone]    Secondary Decision Maker: Lelo Tidwell - Niece/Nephew - [phone]    Click here to complete Healthcare Decision Makers including selection of the Healthcare Decision Maker Relationship (ie \"Primary\").  Today we documented Decision Maker(s) consistent with Legal Next of Kin hierarchy.       If the relationship to the patient does NOT follow our state's Next of Kin hierarchy, the patient MUST complete an ACP Document to allow him/her to act on the patient's behalf. :  No ACP documents on file.  Full Code per physician order.

## 2025-01-03 NOTE — CARE COORDINATION
ASSESSMENT NOTE    Attending Physician: Hu Wilcox MD  Admit Problem: Dehydration [E86.0]  High anion gap metabolic acidosis [E87.29]  Intractable nausea and vomiting [R11.2]  Date/Time of Admission: 1/2/2025  3:56 PM  Problem List:  Patient Active Problem List   Diagnosis    Elevated blood pressure reading in office without diagnosis of hypertension    Large mass of right breast    Malignant phylloides tumor of right breast    Soft tissue neoplasm filling right breast    Skin yeast infection    Abnormal positron emission tomography (PET) scan    Axillary adenopathy    Intractable nausea and vomiting    High anion gap metabolic acidosis    Acute UTI    Hypokalemia    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)    Nausea and vomiting    Mass of breast    Nausea & vomiting    Abnormal positron emission tomography (PET) of colon    Unintentional weight loss    Hypoalbuminemia due to protein-calorie malnutrition (HCC)    Hyperglycemia    Leukocytosis       Service Assessment  Patient Orientation Alert and Oriented, Person, Place, Self   Cognition Alert   History Provided By Patient, Medical Record, Child/Family   Primary Caregiver Self   Accompanied By/Relationship Mother   Support Systems Family Members, Parent   Patient's Healthcare Decision Maker is: Legal Next of Kin   PCP Verified by CM Yes (Sara Newell NP)   Last Visit to PCP Within last 6 months   Prior Functional Level Independent in ADLs/IADLs   Current Functional Level Independent in ADLs/IADLs   Can patient return to prior living arrangement Yes   Ability to make needs known: Good   Family able to assist with home care needs: Yes   Would you like for me to discuss the discharge plan with any other family members/significant others, and if so, who? Yes (Mother)   Financial Resources Other (Comment) (Commercial: link [employer])   Community Resources None   CM/SW Referral Other (see comment) (None)     Social/Functional History  Lives With Parent   Type of

## 2025-01-03 NOTE — H&P
1.676 m (5' 6\").    Weight as of this encounter: 88 kg (194 lb).  No intake or output data in the 24 hours ending 01/02/25 7374      PE:    General:    Well-developed, well nourished female lying on stretcher in no acute distress.  Patient is not diaphoretic and is not toxic-appearing.  Head:  Normocephalic, atraumatic  Eyes:  Sclerae appear nonicteric, no coryza.  Pupils equally round and reactive to light.  EOMI  ENT:  Nares appear normal.  Dry lips and tacky oral mucosa.  No exudates appreciated.  Neck:  Supple, no meningeal signs , no restricted ROM.  Trachea midline.  No thyromegaly appreciated.   CV:   RRR.  No m/r/g.  No jugular venous distension.  Lungs:   CTAB.  No wheezing, rhonchi, or rales.  Symmetric expansion.  Abdomen:   Positive bowel sounds x 4 quadrants, soft, nontender, nondistended.  Extremities: No cyanosis or clubbing.  No edema  Skin:     No rashes.  Right breast is markedly swollen and tender and does have some moderate erythematous changes as well..   Skin otherwise warm and dry.    Neuro:  CN II-XII grossly intact.  Sensation intact.  Patient moves all 4 extremities appropriately and symmetrically.  Psych:  Flat mood and affect.      Orders Placed This Encounter   Medications    sodium chloride 0.9 % bolus 1,000 mL    ondansetron (ZOFRAN) injection 4 mg    metoclopramide (REGLAN) injection 10 mg       Prior to Admit Medications:  Current Outpatient Medications   Medication Instructions    metoclopramide (REGLAN) 10 mg, Oral, 4 TIMES DAILY BEFORE MEALS & NIGHTLY    ondansetron (ZOFRAN) 4 mg, Oral, 3 TIMES DAILY PRN       I have personally reviewed labs and tests:  Recent Results (from the past 24 hour(s))   CBC with Auto Differential    Collection Time: 01/02/25  4:02 PM   Result Value Ref Range    WBC 11.1 4.3 - 11.1 K/uL    RBC 3.70 (L) 4.05 - 5.2 M/uL    Hemoglobin 10.4 (L) 11.7 - 15.4 g/dL    Hematocrit 32.7 (L) 35.8 - 46.3 %    MCV 88.4 82 - 102 FL    MCH 28.1 26.1 - 32.9 PG    MCHC

## 2025-01-04 LAB
ALBUMIN SERPL-MCNC: 1.4 G/DL (ref 3.2–4.6)
ALBUMIN/GLOB SERPL: 0.3 (ref 1–1.9)
ALP SERPL-CCNC: 74 U/L (ref 35–104)
ALT SERPL-CCNC: 12 U/L (ref 8–45)
ANION GAP SERPL CALC-SCNC: 9 MMOL/L (ref 7–16)
AST SERPL-CCNC: 37 U/L (ref 15–37)
BACTERIA SPEC CULT: NORMAL
BILIRUB DIRECT SERPL-MCNC: 0.4 MG/DL (ref 0–0.4)
BILIRUB SERPL-MCNC: 0.6 MG/DL (ref 0–1.2)
BUN SERPL-MCNC: 9 MG/DL (ref 8–23)
CALCIUM SERPL-MCNC: 8.1 MG/DL (ref 8.8–10.2)
CHLORIDE SERPL-SCNC: 97 MMOL/L (ref 98–107)
CO2 SERPL-SCNC: 28 MMOL/L (ref 20–29)
CREAT SERPL-MCNC: 0.23 MG/DL (ref 0.6–1.1)
EKG ATRIAL RATE: 92 BPM
EKG DIAGNOSIS: NORMAL
EKG P AXIS: 47 DEGREES
EKG P-R INTERVAL: 138 MS
EKG Q-T INTERVAL: 366 MS
EKG QRS DURATION: 84 MS
EKG QTC CALCULATION (BAZETT): 452 MS
EKG R AXIS: -13 DEGREES
EKG T AXIS: -13 DEGREES
EKG VENTRICULAR RATE: 92 BPM
GLOBULIN SER CALC-MCNC: 4.2 G/DL (ref 2.3–3.5)
GLUCOSE SERPL-MCNC: 132 MG/DL (ref 70–99)
MAGNESIUM SERPL-MCNC: 1.9 MG/DL (ref 1.8–2.4)
PHOSPHATE SERPL-MCNC: 2.2 MG/DL (ref 2.5–4.5)
POTASSIUM SERPL-SCNC: 2.9 MMOL/L (ref 3.5–5.1)
PROT SERPL-MCNC: 5.6 G/DL (ref 6.3–8.2)
SERVICE CMNT-IMP: NORMAL
SODIUM SERPL-SCNC: 135 MMOL/L (ref 136–145)
TRIGL SERPL-MCNC: 131 MG/DL (ref 0–150)

## 2025-01-04 PROCEDURE — 6370000000 HC RX 637 (ALT 250 FOR IP): Performed by: NURSE PRACTITIONER

## 2025-01-04 PROCEDURE — 02HV33Z INSERTION OF INFUSION DEVICE INTO SUPERIOR VENA CAVA, PERCUTANEOUS APPROACH: ICD-10-PCS | Performed by: FAMILY MEDICINE

## 2025-01-04 PROCEDURE — 80076 HEPATIC FUNCTION PANEL: CPT

## 2025-01-04 PROCEDURE — 36569 INSJ PICC 5 YR+ W/O IMAGING: CPT

## 2025-01-04 PROCEDURE — 6360000002 HC RX W HCPCS: Performed by: NURSE PRACTITIONER

## 2025-01-04 PROCEDURE — 6360000002 HC RX W HCPCS: Performed by: FAMILY MEDICINE

## 2025-01-04 PROCEDURE — 84100 ASSAY OF PHOSPHORUS: CPT

## 2025-01-04 PROCEDURE — 2580000003 HC RX 258: Performed by: NURSE PRACTITIONER

## 2025-01-04 PROCEDURE — 1100000000 HC RM PRIVATE

## 2025-01-04 PROCEDURE — 36415 COLL VENOUS BLD VENIPUNCTURE: CPT

## 2025-01-04 PROCEDURE — 84478 ASSAY OF TRIGLYCERIDES: CPT

## 2025-01-04 PROCEDURE — C1751 CATH, INF, PER/CENT/MIDLINE: HCPCS

## 2025-01-04 PROCEDURE — 2580000003 HC RX 258: Performed by: FAMILY MEDICINE

## 2025-01-04 PROCEDURE — 80048 BASIC METABOLIC PNL TOTAL CA: CPT

## 2025-01-04 PROCEDURE — 93010 ELECTROCARDIOGRAM REPORT: CPT | Performed by: INTERNAL MEDICINE

## 2025-01-04 PROCEDURE — 93005 ELECTROCARDIOGRAM TRACING: CPT | Performed by: NURSE PRACTITIONER

## 2025-01-04 PROCEDURE — 83735 ASSAY OF MAGNESIUM: CPT

## 2025-01-04 PROCEDURE — 2500000003 HC RX 250 WO HCPCS: Performed by: NURSE PRACTITIONER

## 2025-01-04 PROCEDURE — 2500000003 HC RX 250 WO HCPCS: Performed by: FAMILY MEDICINE

## 2025-01-04 RX ORDER — LIDOCAINE HYDROCHLORIDE 10 MG/ML
50 INJECTION, SOLUTION EPIDURAL; INFILTRATION; INTRACAUDAL; PERINEURAL ONCE
Status: DISCONTINUED | OUTPATIENT
Start: 2025-01-04 | End: 2025-01-06 | Stop reason: HOSPADM

## 2025-01-04 RX ORDER — SODIUM CHLORIDE 0.9 % (FLUSH) 0.9 %
5-40 SYRINGE (ML) INJECTION EVERY 12 HOURS SCHEDULED
Status: DISCONTINUED | OUTPATIENT
Start: 2025-01-04 | End: 2025-01-06 | Stop reason: HOSPADM

## 2025-01-04 RX ORDER — SODIUM CHLORIDE 9 MG/ML
INJECTION, SOLUTION INTRAVENOUS PRN
Status: DISCONTINUED | OUTPATIENT
Start: 2025-01-04 | End: 2025-01-06 | Stop reason: HOSPADM

## 2025-01-04 RX ORDER — MENTHOL/CAMPHOR/ALLANTOIN/PHE 0.6-0.5-1%
OINTMENT(EA) TOPICAL PRN
Status: DISCONTINUED | OUTPATIENT
Start: 2025-01-04 | End: 2025-01-06 | Stop reason: HOSPADM

## 2025-01-04 RX ORDER — SODIUM CHLORIDE 0.9 % (FLUSH) 0.9 %
5-40 SYRINGE (ML) INJECTION PRN
Status: DISCONTINUED | OUTPATIENT
Start: 2025-01-04 | End: 2025-01-06 | Stop reason: HOSPADM

## 2025-01-04 RX ADMIN — POTASSIUM CHLORIDE 10 MEQ: 7.45 INJECTION INTRAVENOUS at 08:06

## 2025-01-04 RX ADMIN — METOCLOPRAMIDE 10 MG: 5 INJECTION, SOLUTION INTRAMUSCULAR; INTRAVENOUS at 23:48

## 2025-01-04 RX ADMIN — METOCLOPRAMIDE 10 MG: 5 INJECTION, SOLUTION INTRAMUSCULAR; INTRAVENOUS at 05:25

## 2025-01-04 RX ADMIN — I.V. FAT EMULSION 250 ML: 20 EMULSION INTRAVENOUS at 16:37

## 2025-01-04 RX ADMIN — METOCLOPRAMIDE 10 MG: 5 INJECTION, SOLUTION INTRAMUSCULAR; INTRAVENOUS at 16:25

## 2025-01-04 RX ADMIN — Medication 7 G: at 15:58

## 2025-01-04 RX ADMIN — SODIUM CHLORIDE, PRESERVATIVE FREE 10 ML: 5 INJECTION INTRAVENOUS at 20:20

## 2025-01-04 RX ADMIN — SODIUM CHLORIDE 40 MG: 9 INJECTION INTRAMUSCULAR; INTRAVENOUS; SUBCUTANEOUS at 05:25

## 2025-01-04 RX ADMIN — ANTI-FUNGAL POWDER MICONAZOLE NITRATE TALC FREE: 1.42 POWDER TOPICAL at 08:12

## 2025-01-04 RX ADMIN — ENOXAPARIN SODIUM 40 MG: 100 INJECTION SUBCUTANEOUS at 08:00

## 2025-01-04 RX ADMIN — ANTI-FUNGAL POWDER MICONAZOLE NITRATE TALC FREE: 1.42 POWDER TOPICAL at 20:19

## 2025-01-04 RX ADMIN — SODIUM PHOSPHATE, MONOBASIC, MONOHYDRATE AND SODIUM PHOSPHATE, DIBASIC, ANHYDROUS 15 MMOL: 276; 142 INJECTION, SOLUTION INTRAVENOUS at 10:18

## 2025-01-04 RX ADMIN — METOCLOPRAMIDE 10 MG: 5 INJECTION, SOLUTION INTRAMUSCULAR; INTRAVENOUS at 10:19

## 2025-01-04 RX ADMIN — SODIUM CHLORIDE, PRESERVATIVE FREE 10 ML: 5 INJECTION INTRAVENOUS at 20:19

## 2025-01-04 RX ADMIN — POTASSIUM CHLORIDE: 2 INJECTION, SOLUTION, CONCENTRATE INTRAVENOUS at 16:37

## 2025-01-04 RX ADMIN — POTASSIUM BICARBONATE 40 MEQ: 782 TABLET, EFFERVESCENT ORAL at 09:16

## 2025-01-04 RX ADMIN — SODIUM CHLORIDE, PRESERVATIVE FREE 10 ML: 5 INJECTION INTRAVENOUS at 08:00

## 2025-01-04 RX ADMIN — SODIUM CHLORIDE 40 MG: 9 INJECTION INTRAMUSCULAR; INTRAVENOUS; SUBCUTANEOUS at 16:24

## 2025-01-04 RX ADMIN — FLUCONAZOLE 150 MG: 100 TABLET ORAL at 08:00

## 2025-01-04 ASSESSMENT — PAIN SCALES - GENERAL: PAINLEVEL_OUTOF10: 0

## 2025-01-04 NOTE — PLAN OF CARE
Problem: Safety - Adult  Goal: Free from fall injury  1/4/2025 0718 by Dahlia Antunez, RN  Outcome: Progressing  1/4/2025 0246 by Shu Shi, RN  Outcome: Progressing     Problem: ABCDS Injury Assessment  Goal: Absence of physical injury  1/4/2025 0718 by Dahlia Antunez, RN  Outcome: Progressing  1/4/2025 0246 by Shu Shi, RN  Outcome: Progressing     Problem: Skin/Tissue Integrity  Goal: Absence of new skin breakdown  Description: 1.  Monitor for areas of redness and/or skin breakdown  2.  Assess vascular access sites hourly  3.  Every 4-6 hours minimum:  Change oxygen saturation probe site  4.  Every 4-6 hours:  If on nasal continuous positive airway pressure, respiratory therapy assess nares and determine need for appliance change or resting period.  1/4/2025 0718 by Dahlia Antunez, RN  Outcome: Progressing  1/4/2025 0246 by Shu Shi, RN  Outcome: Progressing

## 2025-01-05 LAB
ANION GAP SERPL CALC-SCNC: 8 MMOL/L (ref 7–16)
BUN SERPL-MCNC: 7 MG/DL (ref 8–23)
CALCIUM SERPL-MCNC: 8.3 MG/DL (ref 8.8–10.2)
CHLORIDE SERPL-SCNC: 97 MMOL/L (ref 98–107)
CO2 SERPL-SCNC: 30 MMOL/L (ref 20–29)
CREAT SERPL-MCNC: 0.33 MG/DL (ref 0.6–1.1)
GLUCOSE BLD STRIP.AUTO-MCNC: 156 MG/DL (ref 65–100)
GLUCOSE SERPL-MCNC: 131 MG/DL (ref 70–99)
MAGNESIUM SERPL-MCNC: 1.9 MG/DL (ref 1.8–2.4)
PHOSPHATE SERPL-MCNC: 3 MG/DL (ref 2.5–4.5)
POTASSIUM SERPL-SCNC: 3.1 MMOL/L (ref 3.5–5.1)
SERVICE CMNT-IMP: ABNORMAL
SODIUM SERPL-SCNC: 135 MMOL/L (ref 136–145)

## 2025-01-05 PROCEDURE — 84100 ASSAY OF PHOSPHORUS: CPT

## 2025-01-05 PROCEDURE — 2500000003 HC RX 250 WO HCPCS: Performed by: FAMILY MEDICINE

## 2025-01-05 PROCEDURE — 2500000003 HC RX 250 WO HCPCS: Performed by: NURSE PRACTITIONER

## 2025-01-05 PROCEDURE — 6360000002 HC RX W HCPCS: Performed by: FAMILY MEDICINE

## 2025-01-05 PROCEDURE — 2580000003 HC RX 258: Performed by: NURSE PRACTITIONER

## 2025-01-05 PROCEDURE — 83735 ASSAY OF MAGNESIUM: CPT

## 2025-01-05 PROCEDURE — 6370000000 HC RX 637 (ALT 250 FOR IP): Performed by: NURSE PRACTITIONER

## 2025-01-05 PROCEDURE — 82962 GLUCOSE BLOOD TEST: CPT

## 2025-01-05 PROCEDURE — 6370000000 HC RX 637 (ALT 250 FOR IP): Performed by: INTERNAL MEDICINE

## 2025-01-05 PROCEDURE — 80048 BASIC METABOLIC PNL TOTAL CA: CPT

## 2025-01-05 PROCEDURE — 1100000000 HC RM PRIVATE

## 2025-01-05 PROCEDURE — 6360000002 HC RX W HCPCS: Performed by: NURSE PRACTITIONER

## 2025-01-05 PROCEDURE — 2580000003 HC RX 258: Performed by: FAMILY MEDICINE

## 2025-01-05 RX ORDER — POTASSIUM CHLORIDE 29.8 MG/ML
20 INJECTION INTRAVENOUS ONCE
Status: CANCELLED | OUTPATIENT
Start: 2025-01-05 | End: 2025-01-05

## 2025-01-05 RX ADMIN — ANTI-FUNGAL POWDER MICONAZOLE NITRATE TALC FREE: 1.42 POWDER TOPICAL at 07:41

## 2025-01-05 RX ADMIN — SODIUM CHLORIDE, PRESERVATIVE FREE 10 ML: 5 INJECTION INTRAVENOUS at 07:39

## 2025-01-05 RX ADMIN — METOCLOPRAMIDE 10 MG: 5 INJECTION, SOLUTION INTRAMUSCULAR; INTRAVENOUS at 05:37

## 2025-01-05 RX ADMIN — SODIUM CHLORIDE, PRESERVATIVE FREE 10 ML: 5 INJECTION INTRAVENOUS at 07:37

## 2025-01-05 RX ADMIN — METOCLOPRAMIDE 10 MG: 5 INJECTION, SOLUTION INTRAMUSCULAR; INTRAVENOUS at 11:04

## 2025-01-05 RX ADMIN — ENOXAPARIN SODIUM 40 MG: 100 INJECTION SUBCUTANEOUS at 07:38

## 2025-01-05 RX ADMIN — SODIUM CHLORIDE, PRESERVATIVE FREE 10 ML: 5 INJECTION INTRAVENOUS at 21:30

## 2025-01-05 RX ADMIN — SODIUM CHLORIDE 40 MG: 9 INJECTION INTRAMUSCULAR; INTRAVENOUS; SUBCUTANEOUS at 05:37

## 2025-01-05 RX ADMIN — POTASSIUM BICARBONATE 40 MEQ: 782 TABLET, EFFERVESCENT ORAL at 07:38

## 2025-01-05 RX ADMIN — ANTI-FUNGAL POWDER MICONAZOLE NITRATE TALC FREE: 1.42 POWDER TOPICAL at 21:30

## 2025-01-05 RX ADMIN — METOCLOPRAMIDE 10 MG: 5 INJECTION, SOLUTION INTRAMUSCULAR; INTRAVENOUS at 16:11

## 2025-01-05 RX ADMIN — POTASSIUM CHLORIDE: 2 INJECTION, SOLUTION, CONCENTRATE INTRAVENOUS at 15:42

## 2025-01-05 RX ADMIN — SODIUM CHLORIDE 40 MG: 9 INJECTION INTRAMUSCULAR; INTRAVENOUS; SUBCUTANEOUS at 16:11

## 2025-01-05 RX ADMIN — SODIUM CHLORIDE, PRESERVATIVE FREE 10 ML: 5 INJECTION INTRAVENOUS at 21:29

## 2025-01-05 RX ADMIN — FLUCONAZOLE 150 MG: 100 TABLET ORAL at 07:38

## 2025-01-05 RX ADMIN — METOCLOPRAMIDE 10 MG: 5 INJECTION, SOLUTION INTRAMUSCULAR; INTRAVENOUS at 23:37

## 2025-01-05 ASSESSMENT — PAIN SCALES - GENERAL: PAINLEVEL_OUTOF10: 0

## 2025-01-05 NOTE — PLAN OF CARE
Problem: Safety - Adult  Goal: Free from fall injury  1/5/2025 0704 by Dahlia Antunez RN  Outcome: Progressing  1/4/2025 1913 by Elke Stacy RN  Outcome: Progressing     Problem: ABCDS Injury Assessment  Goal: Absence of physical injury  1/5/2025 0704 by Dahlia Antunez RN  Outcome: Progressing  1/4/2025 1913 by Elke Stacy RN  Outcome: Progressing     Problem: Skin/Tissue Integrity  Goal: Absence of new skin breakdown  Description: 1.  Monitor for areas of redness and/or skin breakdown  2.  Assess vascular access sites hourly  3.  Every 4-6 hours minimum:  Change oxygen saturation probe site  4.  Every 4-6 hours:  If on nasal continuous positive airway pressure, respiratory therapy assess nares and determine need for appliance change or resting period.  1/5/2025 0704 by Dahlia Antunez RN  Outcome: Progressing  1/4/2025 1913 by Elke Stacy RN  Outcome: Progressing

## 2025-01-06 VITALS
RESPIRATION RATE: 16 BRPM | TEMPERATURE: 99.1 F | HEART RATE: 116 BPM | BODY MASS INDEX: 28.91 KG/M2 | OXYGEN SATURATION: 97 % | HEIGHT: 66 IN | SYSTOLIC BLOOD PRESSURE: 125 MMHG | WEIGHT: 179.9 LBS | DIASTOLIC BLOOD PRESSURE: 74 MMHG

## 2025-01-06 LAB
ANION GAP SERPL CALC-SCNC: 9 MMOL/L (ref 7–16)
BUN SERPL-MCNC: 7 MG/DL (ref 8–23)
CALCIUM SERPL-MCNC: 8.4 MG/DL (ref 8.8–10.2)
CHLORIDE SERPL-SCNC: 97 MMOL/L (ref 98–107)
CO2 SERPL-SCNC: 27 MMOL/L (ref 20–29)
CREAT SERPL-MCNC: 0.28 MG/DL (ref 0.6–1.1)
GLUCOSE SERPL-MCNC: 109 MG/DL (ref 70–99)
MAGNESIUM SERPL-MCNC: 1.7 MG/DL (ref 1.8–2.4)
PHOSPHATE SERPL-MCNC: 3 MG/DL (ref 2.5–4.5)
POTASSIUM SERPL-SCNC: 3.6 MMOL/L (ref 3.5–5.1)
SODIUM SERPL-SCNC: 133 MMOL/L (ref 136–145)

## 2025-01-06 PROCEDURE — 6370000000 HC RX 637 (ALT 250 FOR IP): Performed by: INTERNAL MEDICINE

## 2025-01-06 PROCEDURE — 2500000003 HC RX 250 WO HCPCS: Performed by: NURSE PRACTITIONER

## 2025-01-06 PROCEDURE — 36592 COLLECT BLOOD FROM PICC: CPT

## 2025-01-06 PROCEDURE — 6360000002 HC RX W HCPCS: Performed by: FAMILY MEDICINE

## 2025-01-06 PROCEDURE — 80048 BASIC METABOLIC PNL TOTAL CA: CPT

## 2025-01-06 PROCEDURE — 84100 ASSAY OF PHOSPHORUS: CPT

## 2025-01-06 PROCEDURE — 83735 ASSAY OF MAGNESIUM: CPT

## 2025-01-06 PROCEDURE — 2580000003 HC RX 258: Performed by: FAMILY MEDICINE

## 2025-01-06 PROCEDURE — 2500000003 HC RX 250 WO HCPCS: Performed by: FAMILY MEDICINE

## 2025-01-06 RX ORDER — LANOLIN ALCOHOL/MO/W.PET/CERES
400 CREAM (GRAM) TOPICAL ONCE
Status: COMPLETED | OUTPATIENT
Start: 2025-01-06 | End: 2025-01-06

## 2025-01-06 RX ADMIN — MAGNESIUM GLUCONATE 500 MG ORAL TABLET 400 MG: 500 TABLET ORAL at 11:23

## 2025-01-06 RX ADMIN — SODIUM CHLORIDE 40 MG: 9 INJECTION INTRAMUSCULAR; INTRAVENOUS; SUBCUTANEOUS at 05:38

## 2025-01-06 RX ADMIN — SODIUM CHLORIDE, PRESERVATIVE FREE 10 ML: 5 INJECTION INTRAVENOUS at 07:28

## 2025-01-06 RX ADMIN — METOCLOPRAMIDE 10 MG: 5 INJECTION, SOLUTION INTRAMUSCULAR; INTRAVENOUS at 05:38

## 2025-01-06 RX ADMIN — ANTI-FUNGAL POWDER MICONAZOLE NITRATE TALC FREE: 1.42 POWDER TOPICAL at 07:28

## 2025-01-06 RX ADMIN — METOCLOPRAMIDE 10 MG: 5 INJECTION, SOLUTION INTRAMUSCULAR; INTRAVENOUS at 10:52

## 2025-01-06 NOTE — PLAN OF CARE
Problem: Safety - Adult  Goal: Free from fall injury  Outcome: Progressing  Flowsheets (Taken 1/5/2025 1945)  Free From Fall Injury: Instruct family/caregiver on patient safety     Problem: ABCDS Injury Assessment  Goal: Absence of physical injury  Outcome: Progressing

## 2025-01-06 NOTE — CARE COORDINATION
Pt to d/c home today.  Family will provide transportation.  PT/OT recommend STR but pt declined.  Pt also declined HH services.  No other supportive care needs identified.  Pt agrees with d/c plan.  Milestones met.  LOS = 4 days       01/03/25 0992   Service Assessment   Patient Orientation Alert and Oriented;Person;Place;Self   Cognition Alert   History Provided By Patient;Medical Record;Child/Family   Primary Caregiver Self   Accompanied By/Relationship Mother   Support Systems Family Members;Parent   Patient's Healthcare Decision Maker is: Legal Next of Kin   PCP Verified by CM Yes  (Sara Newell NP)   Last Visit to PCP Within last 6 months   Prior Functional Level Independent in ADLs/IADLs   Current Functional Level Independent in ADLs/IADLs   Can patient return to prior living arrangement Yes   Ability to make needs known: Good   Family able to assist with home care needs: Yes   Would you like for me to discuss the discharge plan with any other family members/significant others, and if so, who? Yes  (Mother)   Financial Resources Other (Comment)  (Commercial: [employer])   Community Resources None   CM/SW Referral Other (see comment)  (None)   Social/Functional History   Lives With Parent   Type of Home House   Home Layout One level   Bathroom Toilet Standard   Bathroom Equipment Commode   Bathroom Accessibility Accessible   Home Equipment None   Receives Help From Family   Prior Level of Assist for ADLs Independent   Prior Level of Assist for Homemaking Independent   Ambulation Assistance Independent   Prior Level of Assist for Transfers Independent   Active  Yes   Mode of Transportation Car   Occupation Retired   Discharge Planning   Type of Residence House   Living Arrangements Parent   Current Services Prior To Admission None   Potential Assistance Needed Home Care   DME Ordered? No   Potential Assistance Purchasing Medications No   Type of Home Care Services None   Patient expects to be discharged

## 2025-01-06 NOTE — PLAN OF CARE
Problem: Safety - Adult  Goal: Free from fall injury  1/6/2025 0708 by Dahlia Antunez RN  Outcome: Progressing  Flowsheets (Taken 1/6/2025 0200 by Paty March RN)  Free From Fall Injury: Instruct family/caregiver on patient safety  1/6/2025 0039 by Paty March RN  Outcome: Progressing  Flowsheets (Taken 1/5/2025 1945)  Free From Fall Injury: Instruct family/caregiver on patient safety     Problem: ABCDS Injury Assessment  Goal: Absence of physical injury  1/6/2025 0708 by Dahlia Antunez RN  Outcome: Progressing  1/6/2025 0039 by Paty March RN  Outcome: Progressing     Problem: Skin/Tissue Integrity  Goal: Absence of new skin breakdown  Description: 1.  Monitor for areas of redness and/or skin breakdown  2.  Assess vascular access sites hourly  3.  Every 4-6 hours minimum:  Change oxygen saturation probe site  4.  Every 4-6 hours:  If on nasal continuous positive airway pressure, respiratory therapy assess nares and determine need for appliance change or resting period.  Outcome: Progressing

## 2025-01-06 NOTE — DISCHARGE SUMMARY
Hospitalist Discharge Summary   Admit Date:  2025  3:56 PM   DC Note date: 2025  Name:  July Kerr   Age:  64 y.o.  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 *      Hospital Course:    July eKrr is a 64 y.o. female with medical history of   Malignant tumor of the right breast  Intractable nausea and vomiting      who presented with continuing nausea and vomiting.      Patient presented to hospital with similar complaints on 2024.  She had extensive workup.  EGD at that time was essentially normal.  Esophagus was dilated and biopsy was taken.  Which showed gastric metaplasia.  Patient's symptoms were reportedly improved with metoclopramide.      GI was consulted to see the patient.  They recommended J-tube placement.  IR is consulted for J-tube placement.     However, patient then got better. She was started on PPN and then she was able to eat solid food. She ate hamburger well and would like to go home under the care of her mother who agreed for the plan.     Patient is being discharged in stable condition.      Disposition: Home  Diet: ADULT DIET; Regular  ADULT ORAL NUTRITION SUPPLEMENT; Breakfast, Lunch, Dinner; Standard High Calorie/High Protein Oral Supplement  Code Status: Full Code    Follow Ups:  Follow-up Information       Follow up

## 2025-01-06 NOTE — PROGRESS NOTES
Physician Progress Note      PATIENT:               KRISSY GALLEGOS  [facility] #:                  [id number]  :                       1960  ADMIT DATE:       2024 7:07 PM  DISCH DATE:        2024 12:40 PM  RESPONDING  PROVIDER #:        Zohaib Pemberton MD          QUERY TEXT:    Pt admitted with nausea and vomiting. Pt noted to have right breast mass, UTI   and duodenitis. If possible, please document in progress notes and discharge   summary if you are evaluating and /or treating any of the following:    The medical record reflects the following:    Risk Factors: right breast mass, UTI and duodenitis  Clinical Indicators: per DC summary \"experiencing nausea and vomiting   therefore GI was consulted.  An EGD was performed showing duodenitis. Also had   UTI on admission treated with IV ATB, Despite all this patient was still   experiencing nausea and Reglan was started.  Patient did experience some   benefit from the Reglan.  At this point I feel that her right breast mass may   be playing a role in her nausea.\"  Treatment: IVF, IV Reglan, Zofran, Protonix  Options provided:  -- Nausea and Vomiting due to Duodenitis  -- Nausea and vomiting due to UTI  -- Nausea and vomiting due to right breast mass  -- Dehydration due to nausea and vomiting due to combination of duodenitis and   UTI.  -- Other - I will add my own diagnosis  -- Disagree - Not applicable / Not valid  -- Disagree - Clinically unable to determine / Unknown  -- Refer to Clinical Documentation Reviewer    PROVIDER RESPONSE TEXT:    This patient has nausea and vomiting due to duodenitis.    Query created by: Ricco Araiza on 2024 10:48 AM      Electronically signed by:  Zohaib Pemberton MD 2025 5:29 PM

## 2025-01-06 NOTE — PROGRESS NOTES
Hospitalist Progress Note   Admit Date:  2025  3:56 PM   Name:  July Kerr   Age:  64 y.o.  Sex:  female  :  1960   MRN:  [id number]   Room:  [facility]/    Presenting/Chief Complaint: Nausea and Breast Pain     Reason(s) for Admission: Dehydration [E86.0]  High anion gap metabolic acidosis [E87.29]  Intractable nausea and vomiting [R11.2]     Hospital Course:   Please see admitting H&P for details of presentation.  In brief, July Kerr is a 64 y.o. CF w/ a PMH of malignant tumor of the R breast, intractable nausea and vomiting since breast biopsy late 2024 with unintentional weight loss who presented to the ER on  after her consultation with Dr. Cabral of Gen surgery (for scheduling of bilateral mastectomy) because of continued problems with nausea and vomiting.  She was recently discharged on Dec/22 after undergoing extensive GI workup.  EGD at that time was essentially normal; esophagus was dilated and biopsy taken that later returned w/ gastric metaplasia (likely d/t 2 months of vomiting).  It was reported that there was some improvement w/ metoclopramide.    In the ER: temp 36.6 F, RR 18, , BP 97/66, SpO2 98% on room air.    WBC 11.1, K+ wnl but hemolyzed sample, AGAP 25.  Resp viral panel (-).  UA non infectious, severe ketonuria.  The patient denies any urinary burning, frequency, urgency, or flank pain.    She was admitted to the Hospitalist service.  GI consulted, recommends J-tube, signed off.  PPN started.  IR consulted for J-tube placement.    Subjective & 24hr Events:   No AEO.  Patient seen this afternoon.  She had bowel movement earlier today.  Clinically appears improved compared to yesterday.  She is tolerating clear liquids without N/V.  Continue Reglan.  J-tube placement reportedly planned for Monday.  Continue electrolyte replacement and trend labs in the morning.    Assessment & Plan:     Intractable nausea and vomiting  IVF, IV PPI, CLD and ok to 
       Hospitalist Progress Note   Admit Date:  2025  3:56 PM   Name:  July Kerr   Age:  64 y.o.  Sex:  female  :  1960   MRN:  [id number]   Room:  [facility]/    Presenting/Chief Complaint: Nausea and Breast Pain     Reason(s) for Admission: Dehydration [E86.0]  High anion gap metabolic acidosis [E87.29]  Intractable nausea and vomiting [R11.2]     Hospital Course:   Please see admitting H&P for details of presentation.  In brief, July Kerr is a 64 y.o. CF w/ a PMH of malignant tumor of the R breast, intractable nausea and vomiting since breast biopsy late 2024 with unintentional weight loss who presented to the ER on  after her consultation with Dr. Cabral of Gen surgery (for scheduling of bilateral mastectomy) because of continued problems with nausea and vomiting.  She was recently discharged on Dec/22 after undergoing extensive GI workup.  EGD at that time was essentially normal; esophagus was dilated and biopsy taken that later returned w/ gastric metaplasia (likely d/t 2 months of vomiting).  It was reported that there was some improvement w/ metoclopramide.    In the ER: temp 36.6 F, RR 18, , BP 97/66, SpO2 98% on room air.    WBC 11.1, K+ wnl but hemolyzed sample, AGAP 25.  Resp viral panel (-).  UA non infectious, severe ketonuria.  The patient denies any urinary burning, frequency, urgency, or flank pain.    She was admitted to the Hospitalist service.    Subjective & 24hr Events:   Patient seen w/ her mother at bedside.  The patient had just vomited prior to me entering the room.  I discussed the case w/ RD who will start PPN and replace electrolytes.  We think enteral supplementation will be needed.  GI agreed and signed off.  We will consult IR for J-tube placement and have RD manage feeds/flushes.  Trend labs in the morning.    Assessment & Plan:     Intractable nausea and vomiting  IVF, IV PPI, CLD  Consultation with GI and RD  Previous work-up in late December 
       Hospitalist Progress Note   Admit Date:  2025  3:56 PM   Name:  July Kerr   Age:  64 y.o.  Sex:  female  :  1960   MRN:  [id number]   Room:  [facility]    Presenting/Chief Complaint: Nausea and Breast Pain     Reason(s) for Admission: Dehydration [E86.0]  High anion gap metabolic acidosis [E87.29]  Intractable nausea and vomiting [R11.2]     Hospital Course:   July Kerr is a 64 y.o. female with medical history of   Malignant tumor of the right breast  Intractable nausea and vomiting     who presented with continuing nausea and vomiting.     Patient presented to hospital with similar complaints on 2024.  She had extensive workup.  EGD at that time was essentially normal.  Esophagus was dilated and biopsy was taken.  Which showed gastric metaplasia.  Patient's symptoms were reportedly improved with metoclopramide.     GI was consulted to see the patient.  They recommended J-tube placement.  IR is consulted for J-tube placement.     Subjective & 24hr Events:     25   Patient would like to change her diet from liquid to regular diet.   She would like to have a burger.   No fever. No shaking. No chills.   No shortness of breath.    No chest pain.       Assessment & Plan:     Principal Problem:    Intractable nausea and vomiting    High anion gap metabolic acidosis  Plan:   Improving with IV hydration and PPN.   Now patient would like to try  solid food by mouth.   I will change diet order from liquid diet to regular diet.   Will see how she responds to treatment.   If she can eat well then we can reconsider the plan for J-tube placement.       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 Cabral.       Hyperglycemia  Plan:   Improved since admission.   Monitor closely.      I have discussed the plan of care with patient.            Anticipated 
  Physician Progress Note      PATIENT:               KRISSY GALLEGOS  [facility] #:                  [id number]  :                       1960  ADMIT DATE:       2025 3:56 PM  DISCH DATE:  RESPONDING  PROVIDER #:        Jorge L Russell NP          QUERY TEXT:    Patient admitted with intractable nausea and vomiting. Noted to have severe   malnutrition documented in 1/3 RD consult. If possible, please document in   progress notes and discharge summary if you are evaluating and /or treating   any of the following:    The medical record reflects the following:  Risk Factors: 64 y.o. female with medical history of malignant [place]   tumor of the right breast, intractable nausea and vomiting since breast biopsy   late 2024 with unintentional weight loss  Clinical Indicators: 1/3 RD consult: Malnutrition Assessment:  Malnutrition Status: Severe malnutrition  Context: Acute Illness  Findings of clinical characteristics of malnutrition:  Energy Intake:  50% or less of estimated energy requirements for 5 or more   days (only PO intake of water x 1 month per pt report)  Weight Loss:  Greater than 7.5% over 3 months  Body Fat Loss:  No body fat loss  Muscle Mass Loss:  No muscle mass loss  Treatment: RD consult, Active Goal: Tolerate nutrition support at goal rate   (within 3 days), Start PPN    ASPEN Criteria:    https://aspenjournals.onlinelibrary.miller.com/doi/full/10.1177/827594761914137  5  Options provided:  -- Protein calorie malnutrition severe  -- Other - I will add my own diagnosis  -- Disagree - Not applicable / Not valid  -- Disagree - Clinically unable to determine / Unknown  -- Refer to Clinical Documentation Reviewer    PROVIDER RESPONSE TEXT:    This patient has severe protein calorie malnutrition.    Query created by: Shellie Nj on 1/3/2025 12:33 PM      Electronically signed by:  Jorge L Russell NP 1/3/2025 1:48 PM          
0759- New order received from Dr. Bell face to face to cancel patient's scheduled J-tube placement today and resume regular diet d/t patient tolerating diet without s/sx of N/V.    0803- Spoke to CURT Li in IR- J tube placement canceled per Dr. Bell.    1216- Discharge paperwork and education given to patient. All questions answered to the best of my ability.   
0810- Patient verbalized misunderstanding and does not want an NG tube; patient educated on PPN and lipds and verbalized understanding- patient is wanting to eat \"grits\"; Jorge L Russell NP aware via face to face.     0849- Patient c/o hand IV \"burning\" d/t IV K+ replacement; Jorge L Russell NP aware via perfect serve.    0850- Okay to order Potassium Effer-K 40 mEq per Jorge L Russell NP via perfect serve, for replacement.     1221- VAT requesting a PICC line. Jorge L Russell NP aware via perfect serve.     1222- Okay to place a double lumen PICC per Jorge L Russell NP via perfect serve. VAT aware of order.   
1542- Tappering PPN per oder; dose adjusted to 30ml/hr.     1712- PPN stopped per MD order.  
ACUTE OCCUPATIONAL THERAPY GOALS:   (Developed with and agreed upon by patient and/or caregiver.)  1. Patient will complete lower body bathing and dressing with MIN A and adaptive equipment as needed.   2.Patient will complete upper body bathing and dressing with SUPERVISION and adaptive equipment as needed.  3. Patient will complete toileting with MIN A.   4. Patient will tolerate 30 minutes of OT treatment with 1-2 rest breaks to increase activity tolerance for ADLs.   5. Patient will complete functional transfers with SUPERVISION and adaptive equipment as needed.   6. Patient will complete functional activity with SUPERVISION and adaptive equipment as needed.  7. Patient will complete simple grooming task in standing with SBA.    Timeframe: 7 visits      OCCUPATIONAL THERAPY Initial Assessment and Daily Note       OT Visit Days: 1  Acknowledge Orders  Time  OT Charge Capture  Rehab Caseload Tracker      July Kerr is a 64 y.o. female   PRIMARY DIAGNOSIS: Intractable nausea and vomiting  Dehydration [E86.0]  High anion gap metabolic acidosis [E87.29]  Intractable nausea and vomiting [R11.2]       Reason for Referral: Generalized Muscle Weakness (M62.81)  Other lack of cordination (R27.8)  Difficulty in walking, Not elsewhere classified (R26.2)  Inpatient: Payor: [employer] / Plan: [employer]TER / Product Type: *No Product type* /     ASSESSMENT:     REHAB RECOMMENDATIONS:   Recommendation to date pending progress:  Setting:  Short-term Rehab    Equipment:    To Be Determined     ASSESSMENT:  Ms. Kerr presented to the hospital with intractable nausea and vomiting. Pt with a PMH significant for malignant R breast tumor--has been undergoing further workup and followed by general surgery team. Pt reported she has had an approx 3 month decline. Pt has been primarily bed-bound due to weakness and requiring assist for all bADLs/SPTs from her 81 year old mother.   Today, pt was received supine in bed. Completed bed 
Change of shift report given to CURT Coleman at the bedside.   
Chart accessed for admission purposes  
End of Shift Note: 7p ~ 7a     Pt able to eat some of 1/2 hamburger and milk, tolerated well.  No c/o nausea this shift.  Reports great discomfort from weight of R breast, hoping to have surgery sooner than scheduled.  VSS.  Report to oncoming RN at      Paty March RN OCN  
Family members requesting placement of NG tube for feeding over the weekend since J tube won't be placed until Monday. Explained that feedings wouldn't be initiated until tomorrow at the earliest if this was the case. Charge Nurse made aware of request and will pass on to night shift RN during bedside shift report to please address during rounds in the morning.     Radha Russell RN   
IP Consult to Vascular Access Team  Consult performed by: Michell Lozano RN  Consult ordered by: Jorge L Russell APRN - CNP  Reason for consult: PICC insertion  Assessment/Recommendations: PICC Placement Note    Correct Procedure: Yes  Time out completed with assistant Marilou Abbott RN and all persons present in agreement with time out. Further documentation in Time Out flowsheet.  Risks and benefits reviewed with patient and informed consent obtained prior to assessment and procedure. Education materials for PICC Care given to patient or family.       A double lumen PICC line was started for Total parenteral nutrition.   Xylocaine used: Yes  Vein Selection for PICC: left basilic  Central Line Insertion Bundle followed: Yes  Complication Related to Insertion: none  Further documentation in IV flowsheet.      The location of the tip of the PICC is verified using ECG technology.  The tip is in the SVC per ECG reading.  See image below.     Line is okay to use: Yes      MICHELL LOZANO RN, CRNI, OCN                  
IP Consult to Vascular Access Team  Consult performed by: Rony Brumfield RN  Consult ordered by: Hu Wilcox MD  Reason for consult: PIV insertion  Assessment/Recommendations: Ultrasound was used to find the vein which was compressible and without any ultrasound features of an artery or nerve bundle. Skin was cleaned and disinfected prior to IV puncture.  Under real-time ultrasound guidance peripheral access was obtained in the left forearm using 20 G 1.75\" Peripheral IV catheter after 1 attempt(s). No immediate complications noted. Patient tolerated the procedure well.  Refer to IV flowsheet for further documentation.             
Nutrition Assessment  Assessment Type: Reassess  Reason for visit:  Best Practice Alert: Malnutrition Screening Tool  and Parenteral Nutrition Management (Hospitalists)  Malnutrition Screening Tool Score: 3    Nutrition Intervention:   Food and/or Nutrient Delivery:   Parenteral Nutrition:  Pt tolerating regular diet and full liquids this afternoon.   Will not re-order PN for tonight.   Please taper PN to 30 mL/hr @ 1600 and d/c @ 1800  Labs:   Basic Metabolic Panel, Hepatic Function Panel, Magnesium and Phosphorus active per nutrition parameters  Triglyceride weekly on Friday  POC Glucoses/SSI Not indicated  Nutrition Related Medication Management:  Electrolyte Replacement:   Continue prn protocol Magnesium and Potassium  Discontinue for phosphorous  Meals and Snacks:  Diet: Continue current order  Encourage family to bring in foods pt will eat  Medical Food Supplements:   Medical food supplement therapy:  Discontinue Magic Cup (frozen oral supplement) 290 calories, 9 grams protein per 4 ounce serving; add milk with each meal  Pt declines all other supplements  Coordination of Nutrition Care:  Coordination with lina care provider RNDahlia     Malnutrition Assessment:  Malnutrition Status: Severe malnutrition  Context: Acute Illness  Findings of clinical characteristics of malnutrition:   Energy Intake:  50% or less of estimated energy requirements for 5 or more days (only PO intake of water x 1 month per pt report)  Weight Loss:  Greater than 7.5% over 3 months     Body Fat Loss:  No body fat loss     Muscle Mass Loss:  No muscle mass loss      Nutrition Assessment:  Food/Nutrition Related History: Per H&P: \"Patient and family report that she has been unable to eat anything solid and has only retained liquid for 20 minutes or less before having vomiting since her prior hospitalization despite taking the Reglan and Zofran as prescribed.  Patient and family reports that she is very weak to the point that she cannot 
TRANSFER - IN REPORT:    Verbal report received from Kuldeep Kam  being received from for routine progression of patient care      Report consisted of patient's Situation, Background, Assessment and   Recommendations(SBAR).     Information from the following report(s) ED SBAR, Adult Overview, and MAR was reviewed with the receiving nurse.    Opportunity for questions and clarification was provided.      Assessment will be completed upon patient's arrival to unit and care assumed.     
AM   Result Value Ref Range    Phosphorus 3.0 2.5 - 4.5 MG/DL   POCT Glucose    Collection Time: 01/05/25  4:12 PM   Result Value Ref Range    POC Glucose 156 (H) 65 - 100 mg/dL    Performed by: Jonel    Basic Metabolic Panel    Collection Time: 01/06/25  4:25 AM   Result Value Ref Range    Sodium 133 (L) 136 - 145 mmol/L    Potassium 3.6 3.5 - 5.1 mmol/L    Chloride 97 (L) 98 - 107 mmol/L    CO2 27 20 - 29 mmol/L    Anion Gap 9 7 - 16 mmol/L    Glucose 109 (H) 70 - 99 mg/dL    BUN 7 (L) 8 - 23 MG/DL    Creatinine 0.28 (L) 0.60 - 1.10 MG/DL    Est, Glom Filt Rate >90 >60 ml/min/1.73m2    Calcium 8.4 (L) 8.8 - 10.2 MG/DL   Magnesium    Collection Time: 01/06/25  4:25 AM   Result Value Ref Range    Magnesium 1.7 (L) 1.8 - 2.4 mg/dL   Phosphorus    Collection Time: 01/06/25  4:25 AM   Result Value Ref Range    Phosphorus 3.0 2.5 - 4.5 MG/DL       No results for input(s): \"COVID19\" in the last 72 hours.      Current Meds:  Current Facility-Administered Medications   Medication Dose Route Frequency    sodium chloride flush 0.9 % injection 5-40 mL  5-40 mL IntraVENous 2 times per day    sodium chloride flush 0.9 % injection 5-40 mL  5-40 mL IntraVENous PRN    0.9 % sodium chloride infusion   IntraVENous PRN    lidocaine PF 1 % injection 50 mg  50 mg IntraDERmal Once    medicated lip ointment (BLISTEX)   Topical PRN    miconazole (MICOTIN) 2 % powder   Topical BID    metoclopramide (REGLAN) injection 10 mg  10 mg IntraVENous Q6H    ondansetron (ZOFRAN) 8 mg in sodium chloride 0.9 % 50 mL IVPB  8 mg IntraVENous Q6H PRN    pantoprazole (PROTONIX) 40 mg in sodium chloride (PF) 0.9 % 10 mL injection  40 mg IntraVENous Q12H    sodium chloride flush 0.9 % injection 5-40 mL  5-40 mL IntraVENous 2 times per day    sodium chloride flush 0.9 % injection 5-40 mL  5-40 mL IntraVENous PRN    0.9 % sodium chloride infusion   IntraVENous PRN    magnesium sulfate 2000 mg in 50 mL IVPB premix  2,000 mg IntraVENous PRN    enoxaparin 
lipids, Hepatic panel appropriate for daily MTE    Current Nutrition Therapies:  ADULT DIET; Clear Liquid  PN-Adult Premix 4.25/5 - Peripheral Line  Diet NPO  Active Parenteral Nutrition Order:  Composition: Premix Peripheral (D5/AA4.25)  Lipids: 250ml, Daily  Duration: Continuous  Volume/Rate: 65 mL/hr  Provides/24 hours: 1030 kcal/day (70% of needs), 78 grams of protein/day (100% of needs), 78 grams of CHO/day and 1560 ml of total volume/day.    Current Intake:   Average Meal Intake: 1-25% (sips of clears)        Anthropometric Measures:  Height: 167.6 cm (5' 5.98\")  Current Body Wt: 88 kg (194 lb 0.1 oz) (1/2), Weight source: Stated  BMI: 31.3, Obese Class 1 (BMI 30.0-34.9)  Admission Body Weight: 88 kg (194 lb 0.1 oz) (1/2)  Ideal Body Weight (Kg) (Calculated): 59 kg (130 lbs), 149.2 %  BMI Category Obese Class 1 (BMI 30.0-34.9)    Comparative Standards:  Energy (kcal/day): 1370-5966 (25-30 kcal/kg) (Kcal/kg Weight used: 59 kg Ideal  Protein (g/day): 71-89 (1.2-1.5 g/kg) Weight Used: (Ideal) 59 kg  Fluid (ml/day):   (1 ml/kcal)    Nutrition Diagnosis:   Inadequate oral intake related to altered GI function as evidenced by NPO or clear liquid status due to medical condition, vomiting, nausea  Severe malnutrition, in context of acute, non-illness related related to inadequate protein-energy intake as evidenced by criteria as identified in malnutrition assessment    Nutrition Goal(s):   Previous Goal Met: Progressing toward Goal(s)  Active Goal: Tolerate nutrition support at goal rate (within 3 days)  Type of Goal: Continue current goal    Discharge Planning:    Too soon to determine    Radha Bowman RD    
oswaldo , Decreased step clearance, Decreased step length, Step-to, and B LE weakness    Weightbearing Status      Stairs      I=Independent, Mod I=Modified Independent, S=Supervision, SBA=Standby Assistance, CGA=Contact Guard Assistance,   Min=Minimal Assistance, Mod=Moderate Assistance, Max=Maximal Assistance, Total=Total Assistance, NT=Not Tested    PLAN:   FREQUENCY AND DURATION: 3 times/week for duration of hospital stay or until stated goals are met, whichever comes first.    THERAPY PROGNOSIS: Good    PROBLEM LIST:   (Skilled intervention is medically necessary to address:)  Decreased ADL/Functional Activities  Decreased Activity Tolerance  Decreased Balance  Decreased Gait Ability  Decreased Strength  Decreased Transfer Abilities INTERVENTIONS PLANNED:   (Benefits and precautions of physical therapy have been discussed with the patient.)  Therapeutic Activity  Therapeutic Exercise/HEP  Neuromuscular Re-education  Gait Training  Education       TREATMENT:   EVALUATION: LOW COMPLEXITY: (Untimed Charge)  The initial evaluation charge encompasses clinical chart review, objective assessment, interpretation of assessment, and skilled monitoring of the patient's response to treatment in order to develop a plan of care. At this time, patient is appropriate for Co-treatment with occupational therapy due to patient's decreased overall endurance/tolerance levels, as well medical status. July Kerr is appropriate for a multidisciplinary co-treatment of PT and OT to address goals of both disciplines.   TREATMENT:   Neuromuscular Re-education (23 Minutes): Neuromuscular Re-education included Balance Training, Functional mobility with facilitation, Sitting balance training, Standing balance training, and pre gait transfer training with multimodal cues, weight shifts, stepping, and activity pacing to improve Balance and Functional Mobility.    TREATMENT GRID:  N/A    AFTER TREATMENT PRECAUTIONS: Bed, Bed/Chair Locked,

## 2025-01-07 ENCOUNTER — TELEPHONE (OUTPATIENT)
Dept: SURGERY | Age: 65
End: 2025-01-07

## 2025-01-07 DIAGNOSIS — D64.9 ANEMIA, UNSPECIFIED TYPE: Primary | ICD-10-CM

## 2025-01-07 NOTE — TELEPHONE ENCOUNTER
Chart placed for anesthesia review 1/6/25 Mg 1.7, 1/3/25 Hgb 8.4     Dr. Novoa reviewed chart and states \"ideally she sees PCP for iron infusion ASAP. Don't delay surgery if unable to accomplish for cancer surgery.\"

## 2025-01-10 NOTE — TELEPHONE ENCOUNTER
Called pt and left a voice message about the referral, also oncology has sent her a contact already to call them back to schedule appt.

## 2025-01-17 ENCOUNTER — APPOINTMENT (OUTPATIENT)
Dept: CT IMAGING | Age: 65
End: 2025-01-17
Payer: COMMERCIAL

## 2025-01-17 ENCOUNTER — APPOINTMENT (OUTPATIENT)
Dept: GENERAL RADIOLOGY | Age: 65
End: 2025-01-17
Payer: COMMERCIAL

## 2025-01-17 ENCOUNTER — HOSPITAL ENCOUNTER (INPATIENT)
Age: 65
LOS: 8 days | Discharge: HOME HEALTH | End: 2025-01-25
Attending: EMERGENCY MEDICINE | Admitting: HOSPITALIST
Payer: COMMERCIAL

## 2025-01-17 DIAGNOSIS — R79.89 ELEVATED D-DIMER: ICD-10-CM

## 2025-01-17 DIAGNOSIS — A41.9 SEPTICEMIA (HCC): Primary | ICD-10-CM

## 2025-01-17 DIAGNOSIS — R00.0 TACHYCARDIA: ICD-10-CM

## 2025-01-17 DIAGNOSIS — N30.00 ACUTE CYSTITIS WITHOUT HEMATURIA: ICD-10-CM

## 2025-01-17 DIAGNOSIS — R55 NEAR SYNCOPE: ICD-10-CM

## 2025-01-17 LAB
ALBUMIN SERPL-MCNC: 1.9 G/DL (ref 3.2–4.6)
ALBUMIN/GLOB SERPL: 0.3 (ref 1–1.9)
ALP SERPL-CCNC: 178 U/L (ref 35–104)
ALT SERPL-CCNC: 35 U/L (ref 8–45)
ANION GAP SERPL CALC-SCNC: 18 MMOL/L (ref 7–16)
APPEARANCE UR: ABNORMAL
AST SERPL-CCNC: 53 U/L (ref 15–37)
BACTERIA URNS QL MICRO: ABNORMAL /HPF
BASOPHILS # BLD: 0.05 K/UL (ref 0–0.2)
BASOPHILS NFR BLD: 0.3 % (ref 0–2)
BILIRUB SERPL-MCNC: 0.6 MG/DL (ref 0–1.2)
BILIRUB UR QL: NEGATIVE
BUN SERPL-MCNC: 9 MG/DL (ref 8–23)
CALCIUM SERPL-MCNC: 9.2 MG/DL (ref 8.8–10.2)
CHLORIDE SERPL-SCNC: 93 MMOL/L (ref 98–107)
CO2 SERPL-SCNC: 23 MMOL/L (ref 20–29)
COLOR UR: ABNORMAL
CREAT SERPL-MCNC: 0.44 MG/DL (ref 0.6–1.1)
D DIMER PPP FEU-MCNC: 9.93 UG/ML(FEU)
DIFFERENTIAL METHOD BLD: ABNORMAL
EKG ATRIAL RATE: 125 BPM
EKG DIAGNOSIS: NORMAL
EKG P AXIS: 64 DEGREES
EKG P-R INTERVAL: 141 MS
EKG Q-T INTERVAL: 285 MS
EKG QRS DURATION: 73 MS
EKG QTC CALCULATION (BAZETT): 411 MS
EKG R AXIS: 4 DEGREES
EKG T AXIS: 219 DEGREES
EKG VENTRICULAR RATE: 125 BPM
EOSINOPHIL # BLD: 0.07 K/UL (ref 0–0.8)
EOSINOPHIL NFR BLD: 0.5 % (ref 0.5–7.8)
EPI CELLS #/AREA URNS HPF: ABNORMAL /HPF
ERYTHROCYTE [DISTWIDTH] IN BLOOD BY AUTOMATED COUNT: 16.2 % (ref 11.9–14.6)
GLOBULIN SER CALC-MCNC: 5.8 G/DL (ref 2.3–3.5)
GLUCOSE SERPL-MCNC: 127 MG/DL (ref 70–99)
GLUCOSE UR STRIP.AUTO-MCNC: NEGATIVE MG/DL
HCT VFR BLD AUTO: 29.6 % (ref 35.8–46.3)
HGB BLD-MCNC: 9.1 G/DL (ref 11.7–15.4)
HGB UR QL STRIP: NEGATIVE
IMM GRANULOCYTES # BLD AUTO: 0.19 K/UL (ref 0–0.5)
IMM GRANULOCYTES NFR BLD AUTO: 1.3 % (ref 0–5)
KETONES UR QL STRIP.AUTO: 40 MG/DL
LACTATE SERPL-SCNC: 1.6 MMOL/L (ref 0.5–2)
LACTATE SERPL-SCNC: 2 MMOL/L (ref 0.5–2)
LEUKOCYTE ESTERASE UR QL STRIP.AUTO: ABNORMAL
LYMPHOCYTES # BLD: 1.03 K/UL (ref 0.5–4.6)
LYMPHOCYTES NFR BLD: 7.1 % (ref 13–44)
MCH RBC QN AUTO: 27.2 PG (ref 26.1–32.9)
MCHC RBC AUTO-ENTMCNC: 30.7 G/DL (ref 31.4–35)
MCV RBC AUTO: 88.4 FL (ref 82–102)
MONOCYTES # BLD: 0.66 K/UL (ref 0.1–1.3)
MONOCYTES NFR BLD: 4.6 % (ref 4–12)
NEUTS SEG # BLD: 12.45 K/UL (ref 1.7–8.2)
NEUTS SEG NFR BLD: 86.2 % (ref 43–78)
NITRITE UR QL STRIP.AUTO: NEGATIVE
NRBC # BLD: 0 K/UL (ref 0–0.2)
OTHER OBSERVATIONS: ABNORMAL
PH UR STRIP: 6 (ref 5–9)
PLATELET # BLD AUTO: 604 K/UL (ref 150–450)
PMV BLD AUTO: 8.8 FL (ref 9.4–12.3)
POTASSIUM SERPL-SCNC: 3.6 MMOL/L (ref 3.5–5.1)
PROCALCITONIN SERPL-MCNC: 2.03 NG/ML (ref 0–0.1)
PROT SERPL-MCNC: 7.7 G/DL (ref 6.3–8.2)
PROT UR STRIP-MCNC: 30 MG/DL
RBC # BLD AUTO: 3.35 M/UL (ref 4.05–5.2)
SODIUM SERPL-SCNC: 133 MMOL/L (ref 136–145)
SP GR UR REFRACTOMETRY: >1.035 (ref 1–1.02)
UROBILINOGEN UR QL STRIP.AUTO: 1 EU/DL (ref 0.2–1)
WBC # BLD AUTO: 14.5 K/UL (ref 4.3–11.1)
WBC URNS QL MICRO: ABNORMAL /HPF

## 2025-01-17 PROCEDURE — 93010 ELECTROCARDIOGRAM REPORT: CPT | Performed by: INTERNAL MEDICINE

## 2025-01-17 PROCEDURE — 1100000000 HC RM PRIVATE

## 2025-01-17 PROCEDURE — 6360000002 HC RX W HCPCS: Performed by: HOSPITALIST

## 2025-01-17 PROCEDURE — 2580000003 HC RX 258: Performed by: HOSPITALIST

## 2025-01-17 PROCEDURE — 83605 ASSAY OF LACTIC ACID: CPT

## 2025-01-17 PROCEDURE — 6360000004 HC RX CONTRAST MEDICATION: Performed by: EMERGENCY MEDICINE

## 2025-01-17 PROCEDURE — 71260 CT THORAX DX C+: CPT

## 2025-01-17 PROCEDURE — 2580000003 HC RX 258: Performed by: EMERGENCY MEDICINE

## 2025-01-17 PROCEDURE — 85025 COMPLETE CBC W/AUTO DIFF WBC: CPT

## 2025-01-17 PROCEDURE — 80053 COMPREHEN METABOLIC PANEL: CPT

## 2025-01-17 PROCEDURE — 87086 URINE CULTURE/COLONY COUNT: CPT

## 2025-01-17 PROCEDURE — 99285 EMERGENCY DEPT VISIT HI MDM: CPT

## 2025-01-17 PROCEDURE — 2500000003 HC RX 250 WO HCPCS: Performed by: HOSPITALIST

## 2025-01-17 PROCEDURE — 70450 CT HEAD/BRAIN W/O DYE: CPT

## 2025-01-17 PROCEDURE — 85379 FIBRIN DEGRADATION QUANT: CPT

## 2025-01-17 PROCEDURE — 36415 COLL VENOUS BLD VENIPUNCTURE: CPT

## 2025-01-17 PROCEDURE — 71045 X-RAY EXAM CHEST 1 VIEW: CPT

## 2025-01-17 PROCEDURE — 93005 ELECTROCARDIOGRAM TRACING: CPT | Performed by: EMERGENCY MEDICINE

## 2025-01-17 PROCEDURE — 81001 URINALYSIS AUTO W/SCOPE: CPT

## 2025-01-17 PROCEDURE — 6370000000 HC RX 637 (ALT 250 FOR IP): Performed by: EMERGENCY MEDICINE

## 2025-01-17 PROCEDURE — 6360000002 HC RX W HCPCS: Performed by: EMERGENCY MEDICINE

## 2025-01-17 PROCEDURE — 84145 PROCALCITONIN (PCT): CPT

## 2025-01-17 RX ORDER — ONDANSETRON 2 MG/ML
4 INJECTION INTRAMUSCULAR; INTRAVENOUS EVERY 4 HOURS PRN
Status: DISCONTINUED | OUTPATIENT
Start: 2025-01-17 | End: 2025-01-25 | Stop reason: HOSPADM

## 2025-01-17 RX ORDER — ACETAMINOPHEN 650 MG/1
650 SUPPOSITORY RECTAL EVERY 6 HOURS PRN
Status: DISCONTINUED | OUTPATIENT
Start: 2025-01-17 | End: 2025-01-25 | Stop reason: HOSPADM

## 2025-01-17 RX ORDER — 0.9 % SODIUM CHLORIDE 0.9 %
1000 INTRAVENOUS SOLUTION INTRAVENOUS ONCE
Status: COMPLETED | OUTPATIENT
Start: 2025-01-17 | End: 2025-01-17

## 2025-01-17 RX ORDER — METOCLOPRAMIDE HYDROCHLORIDE 5 MG/ML
5 INJECTION INTRAMUSCULAR; INTRAVENOUS EVERY 6 HOURS
Status: COMPLETED | OUTPATIENT
Start: 2025-01-17 | End: 2025-01-19

## 2025-01-17 RX ORDER — IOPAMIDOL 755 MG/ML
75 INJECTION, SOLUTION INTRAVASCULAR
Status: COMPLETED | OUTPATIENT
Start: 2025-01-17 | End: 2025-01-17

## 2025-01-17 RX ORDER — 0.9 % SODIUM CHLORIDE 0.9 %
1000 INTRAVENOUS SOLUTION INTRAVENOUS ONCE
Status: DISCONTINUED | OUTPATIENT
Start: 2025-01-17 | End: 2025-01-17

## 2025-01-17 RX ORDER — ACETAMINOPHEN 325 MG/1
650 TABLET ORAL EVERY 4 HOURS PRN
Status: DISCONTINUED | OUTPATIENT
Start: 2025-01-17 | End: 2025-01-25 | Stop reason: HOSPADM

## 2025-01-17 RX ORDER — PROMETHAZINE HYDROCHLORIDE 25 MG/1
25 TABLET ORAL EVERY 6 HOURS PRN
Status: DISCONTINUED | OUTPATIENT
Start: 2025-01-17 | End: 2025-01-25 | Stop reason: HOSPADM

## 2025-01-17 RX ORDER — SODIUM CHLORIDE 0.9 % (FLUSH) 0.9 %
5-40 SYRINGE (ML) INJECTION PRN
Status: DISCONTINUED | OUTPATIENT
Start: 2025-01-17 | End: 2025-01-25 | Stop reason: HOSPADM

## 2025-01-17 RX ORDER — LEVOFLOXACIN 5 MG/ML
750 INJECTION, SOLUTION INTRAVENOUS EVERY 24 HOURS
Status: DISCONTINUED | OUTPATIENT
Start: 2025-01-17 | End: 2025-01-17

## 2025-01-17 RX ORDER — LACTOBACILLUS RHAMNOSUS GG 10B CELL
1 CAPSULE ORAL
Status: DISCONTINUED | OUTPATIENT
Start: 2025-01-18 | End: 2025-01-25 | Stop reason: HOSPADM

## 2025-01-17 RX ORDER — SODIUM CHLORIDE 9 MG/ML
INJECTION, SOLUTION INTRAVENOUS PRN
Status: DISCONTINUED | OUTPATIENT
Start: 2025-01-17 | End: 2025-01-25 | Stop reason: HOSPADM

## 2025-01-17 RX ORDER — LOPERAMIDE HYDROCHLORIDE 2 MG/1
2 CAPSULE ORAL 4 TIMES DAILY PRN
Status: DISCONTINUED | OUTPATIENT
Start: 2025-01-17 | End: 2025-01-25 | Stop reason: HOSPADM

## 2025-01-17 RX ORDER — ENOXAPARIN SODIUM 100 MG/ML
40 INJECTION SUBCUTANEOUS EVERY EVENING
Status: DISCONTINUED | OUTPATIENT
Start: 2025-01-17 | End: 2025-01-25 | Stop reason: HOSPADM

## 2025-01-17 RX ORDER — SODIUM CHLORIDE, SODIUM LACTATE, POTASSIUM CHLORIDE, CALCIUM CHLORIDE 600; 310; 30; 20 MG/100ML; MG/100ML; MG/100ML; MG/100ML
INJECTION, SOLUTION INTRAVENOUS CONTINUOUS
Status: DISCONTINUED | OUTPATIENT
Start: 2025-01-17 | End: 2025-01-18

## 2025-01-17 RX ORDER — SODIUM CHLORIDE 0.9 % (FLUSH) 0.9 %
5-40 SYRINGE (ML) INJECTION EVERY 12 HOURS SCHEDULED
Status: DISCONTINUED | OUTPATIENT
Start: 2025-01-17 | End: 2025-01-25 | Stop reason: HOSPADM

## 2025-01-17 RX ADMIN — HYOSCYAMINE SULFATE 0.12 MG: 0.12 TABLET ORAL; SUBLINGUAL at 10:29

## 2025-01-17 RX ADMIN — METOCLOPRAMIDE 5 MG: 5 INJECTION, SOLUTION INTRAMUSCULAR; INTRAVENOUS at 16:01

## 2025-01-17 RX ADMIN — SODIUM CHLORIDE, POTASSIUM CHLORIDE, SODIUM LACTATE AND CALCIUM CHLORIDE: 600; 310; 30; 20 INJECTION, SOLUTION INTRAVENOUS at 17:58

## 2025-01-17 RX ADMIN — SODIUM BICARBONATE 50 MEQ: 84 INJECTION, SOLUTION INTRAVENOUS at 16:01

## 2025-01-17 RX ADMIN — ENOXAPARIN SODIUM 40 MG: 100 INJECTION SUBCUTANEOUS at 18:26

## 2025-01-17 RX ADMIN — PIPERACILLIN AND TAZOBACTAM 4500 MG: 4; .5 INJECTION, POWDER, LYOPHILIZED, FOR SOLUTION INTRAVENOUS at 13:41

## 2025-01-17 RX ADMIN — SODIUM CHLORIDE 1000 ML: 9 INJECTION, SOLUTION INTRAVENOUS at 10:31

## 2025-01-17 RX ADMIN — METOCLOPRAMIDE 5 MG: 5 INJECTION, SOLUTION INTRAMUSCULAR; INTRAVENOUS at 19:50

## 2025-01-17 RX ADMIN — SODIUM CHLORIDE 1000 ML: 9 INJECTION, SOLUTION INTRAVENOUS at 14:14

## 2025-01-17 RX ADMIN — PIPERACILLIN AND TAZOBACTAM 3375 MG: 3; .375 INJECTION, POWDER, LYOPHILIZED, FOR SOLUTION INTRAVENOUS at 19:58

## 2025-01-17 RX ADMIN — IOPAMIDOL 75 ML: 755 INJECTION, SOLUTION INTRAVENOUS at 12:07

## 2025-01-17 RX ADMIN — FAMOTIDINE 20 MG: 10 INJECTION, SOLUTION INTRAVENOUS at 16:01

## 2025-01-17 RX ADMIN — FAMOTIDINE 20 MG: 10 INJECTION, SOLUTION INTRAVENOUS at 19:50

## 2025-01-17 RX ADMIN — SODIUM CHLORIDE, PRESERVATIVE FREE 10 ML: 5 INJECTION INTRAVENOUS at 21:00

## 2025-01-17 ASSESSMENT — PAIN SCALES - GENERAL: PAINLEVEL_OUTOF10: 0

## 2025-01-17 NOTE — H&P
Hospitalist History and Physical   Admit Date:  2025 10:06 AM   Name:  July Kerr   Age:  64 y.o.  Sex:  female  :  1960   MRN:  [id number]   Room:  02/    Presenting/Chief Complaint: Loss of Consciousness     Reason(s) for Admission: Sepsis (HCC) [A41.9]     History of Present Illness:   July Kerr is a 64 y.o. female with medical history of malignant phyllodes tumor of right breast with axillary lymphadenopathy, presented to the ER today with chief complaint of near syncopal event.  Patient reported having weakness and numbness prior to having near syncopal event today.  Patient reports having loose stools for the past few days, had 2 episodes of diarrhea this morning.  She denies having any vomiting nausea.  Patient has been n.p.o. since midnight as she was scheduled for bilateral mastectomy by Dr. Cabral today.  Patient denies any acute fever, chills, shortness of breath, cough congestion.  She does report of extensive swelling in right breast with some discomfort and heaviness.  She denies having any paresthesia, headache, blurry vision/diplopia.  VS on arrival: Tmax 97.9, RR 16-22, , /62, room air sats of 97%.  Blood work remarkable for anion gap 18, lactic acid 2.0, Pro-Gopi 2.03, albumin 1.9, , WBC 14.5 K, UA with 3+ bacteria, small leukocyte esterase.  CT chest negative for PE, large right breast mass spanning 22 cm with equivocal metastatic nodes in right axilla.  Twelve-lead EKG with sinus tachycardia.      Assessment & Plan:     Principal Problem:    Sepsis (HCC)    Acute UTI  Plan: -  Patient met sepsis criteria based on tachycardia, leukocytosis, source of infection.  Follow-up with blood and urine culture.  UA suggestive of UTI.  Continue empiric IV Zosyn.  IV fluids for hydration.  Trend lactic acid until normalized.         Near Syncopal event  Plan: -  Likely from dehydration.  Check orthostatic vitals.  CT head without contrast

## 2025-01-17 NOTE — PROGRESS NOTES
4 Eyes Skin Assessment     NAME:  July Kerr  YOB: 1960  MEDICAL RECORD NUMBER:  [id number]    The patient is being assessed for  Admission    I agree that at least one RN has performed a thorough Head to Toe Skin Assessment on the patient. ALL assessment sites listed below have been assessed.      Areas assessed by both nurses:    Head, Face, Ears, Shoulders, Back, Chest, Arms, Elbows, Hands, Sacrum. Buttock, Coccyx, Ischium, Legs. Feet and Heels, and Under Medical Devices         Does the Patient have a Wound? No noted wound(s)       Garrison Prevention initiated by RN: No  Wound Care Orders initiated by RN: No    Pressure Injury (Stage 3,4, Unstageable, DTI, NWPT, and Complex wounds) if present, place Wound referral order by RN under : No    New Ostomies, if present place, Ostomy referral order under : No     Nurse 1 eSignature: Electronically signed by CHERELLE MCCOY RN on 1/17/25 at 6:19 PM EST    **SHARE this note so that the co-signing nurse can place an eSignature**    Nurse 2 eSignature: Electronically signed by Randee Carey RN on 1/17/25 at 6:21 PM EST

## 2025-01-17 NOTE — PROGRESS NOTES
TRANSFER - IN REPORT:    Verbal report received from CURT Fraser on July Kerr  being received from ED for routine progression of patient care      Report consisted of patient's Situation, Background, Assessment and   Recommendations(SBAR).     Information from the following report(s) Nurse Handoff Report was reviewed with the receiving nurse.    Opportunity for questions and clarification was provided.      Assessment completed upon patient's arrival to unit and care assumed. \

## 2025-01-17 NOTE — ED PROVIDER NOTES
Emergency Department Provider Note       PCP: Sara Newell APRN - NP   Age: 64 y.o.   Sex: female     DISPOSITION Decision To Admit 01/17/2025 01:15:13 PM    ICD-10-CM    1. Septicemia (HCC)  A41.9       2. Acute cystitis without hematuria  N30.00       3. Near syncope  R55           Medical Decision Making     Pt initially appears dehydrated from loose stools and recent illness.  She had no infectious symptoms.  She had an elevated WBC and sepsis workup was added.  Lactic is normal but she meets SIRS criteria and was eventually found to have UTI.  Given zosyn.  I advised GS of her being in the dept upon arrival.       1 or more acute illnesses that pose a threat to life or bodily function.   Prescription drug management performed.  Discussion with external consultants.  Shared medical decision making was utilized in creating the patients health plan today.  I independently ordered and reviewed each unique test.    I reviewed external records: provider visit note from outside specialist.       I interpreted the CT Scan no PE.  ED provider's independent EKG interpretation sinus tachycardia, nl QRS, no ST elevation.    The patient was admitted and I have discussed patient management with the admitting provider.  The management of this patient was discussed with an external consultant.  Shine Cabral    Exclusion criteria - the patient is NOT to be included for SEP-1 Core Measure due to: May have criteria for sepsis, but does not meet criteria for severe sepsis or septic shock       History     Presents with complaint of near syncopal episode.  Patient states she had 2 episodes of loose stools this morning.  No blood.  She states she was just too weak to get up afterwards.  She is post to have a double mastectomy today.  She denies any abdominal pain fever dysuria chest pain or shortness of breath.  Patient has had no treatment for her breast cancer as of yet.  Her only med is tylenol.    The history is  cm  craniocaudally.    UPPER ABDOMEN: No acute findings. No metastatic disease identified.    BONES: No suspicious osseous lesion.         Impression    1.  No pulmonary embolus. Normal thoracic aorta.  2.  No acute findings within the chest.  3.  Large right breast mass spanning 22 cm with equivocal metastatic nodes in  the right axilla.        Electronically signed by Carloz Flores   CBC with Auto Differential   Result Value Ref Range    WBC 14.5 (H) 4.3 - 11.1 K/uL    RBC 3.35 (L) 4.05 - 5.2 M/uL    Hemoglobin 9.1 (L) 11.7 - 15.4 g/dL    Hematocrit 29.6 (L) 35.8 - 46.3 %    MCV 88.4 82 - 102 FL    MCH 27.2 26.1 - 32.9 PG    MCHC 30.7 (L) 31.4 - 35.0 g/dL    RDW 16.2 (H) 11.9 - 14.6 %    Platelets 604 (H) 150 - 450 K/uL    MPV 8.8 (L) 9.4 - 12.3 FL    nRBC 0.00 0.0 - 0.2 K/uL    Differential Type AUTOMATED      Neutrophils % 86.2 (H) 43.0 - 78.0 %    Lymphocytes % 7.1 (L) 13.0 - 44.0 %    Monocytes % 4.6 4.0 - 12.0 %    Eosinophils % 0.5 0.5 - 7.8 %    Basophils % 0.3 0.0 - 2.0 %    Immature Granulocytes % 1.3 0.0 - 5.0 %    Neutrophils Absolute 12.45 (H) 1.70 - 8.20 K/UL    Lymphocytes Absolute 1.03 0.50 - 4.60 K/UL    Monocytes Absolute 0.66 0.10 - 1.30 K/UL    Eosinophils Absolute 0.07 0.00 - 0.80 K/UL    Basophils Absolute 0.05 0.00 - 0.20 K/UL    Immature Granulocytes Absolute 0.19 0.0 - 0.5 K/UL   Comprehensive Metabolic Panel   Result Value Ref Range    Sodium 133 (L) 136 - 145 mmol/L    Potassium 3.6 3.5 - 5.1 mmol/L    Chloride 93 (L) 98 - 107 mmol/L    CO2 23 20 - 29 mmol/L    Anion Gap 18 (H) 7 - 16 mmol/L    Glucose 127 (H) 70 - 99 mg/dL    BUN 9 8 - 23 MG/DL    Creatinine 0.44 (L) 0.60 - 1.10 MG/DL    Est, Glom Filt Rate >90 >60 ml/min/1.73m2    Calcium 9.2 8.8 - 10.2 MG/DL    Total Bilirubin 0.6 0.0 - 1.2 MG/DL    ALT 35 8 - 45 U/L    AST 53 (H) 15 - 37 U/L    Alk Phosphatase 178 (H) 35 - 104 U/L    Total Protein 7.7 6.3 - 8.2 g/dL    Albumin 1.9 (L) 3.2 - 4.6 g/dL    Globulin 5.8 (H) 2.3 - 3.5 g/dL

## 2025-01-17 NOTE — ED TRIAGE NOTES
Pt arrives via [facility] coming from home c/o near syncopal event witnessed by family. Per family, pt was using walker when she felt faint and lowered herself to her walker. No LOC, no head injury

## 2025-01-17 NOTE — PERIOP NOTE
Pt scheduled for bilateral mastectomy today. Noted that pt in ER for near syncopal event. Dr. Lugo and Dr. Cabral aware.

## 2025-01-17 NOTE — PROGRESS NOTES
Physical Therapy Note:    Attempted to see patient this PM for physical therapy evaluation session. Eval deferred for now per RN. Will follow and re-attempt as schedule permits/patient available. Thank you,    ILIANA DENNEY, PT     Rehab Caseload Tracker

## 2025-01-17 NOTE — PROGRESS NOTES
Occupational Therapy Note:    Attempted to see patient this PM for occupational therapy evaluation session. RN asked to hold for now, wait until transfer to the floor. Will follow and re-attempt as schedule permits/patient available. Thank you,    DANA Jean, OT    Rehab Caseload Tracker

## 2025-01-18 LAB
ALBUMIN SERPL-MCNC: 1.3 G/DL (ref 3.2–4.6)
ALBUMIN/GLOB SERPL: 0.3 (ref 1–1.9)
ALP SERPL-CCNC: 131 U/L (ref 35–104)
ALT SERPL-CCNC: 13 U/L (ref 8–45)
ANION GAP SERPL CALC-SCNC: 15 MMOL/L (ref 7–16)
APPEARANCE UR: ABNORMAL
AST SERPL-CCNC: 31 U/L (ref 15–37)
BACTERIA SPEC CULT: NORMAL
BACTERIA URNS QL MICRO: NEGATIVE /HPF
BASOPHILS # BLD: 0.02 K/UL (ref 0–0.2)
BASOPHILS NFR BLD: 0.3 % (ref 0–2)
BILIRUB SERPL-MCNC: 0.4 MG/DL (ref 0–1.2)
BILIRUB UR QL: NEGATIVE
BUN SERPL-MCNC: 6 MG/DL (ref 8–23)
CALCIUM SERPL-MCNC: 8.4 MG/DL (ref 8.8–10.2)
CHLORIDE SERPL-SCNC: 96 MMOL/L (ref 98–107)
CO2 SERPL-SCNC: 21 MMOL/L (ref 20–29)
COLOR UR: ABNORMAL
CREAT SERPL-MCNC: 0.33 MG/DL (ref 0.6–1.1)
DIFFERENTIAL METHOD BLD: ABNORMAL
EOSINOPHIL # BLD: 0 K/UL (ref 0–0.8)
EOSINOPHIL NFR BLD: 0 % (ref 0.5–7.8)
EPI CELLS #/AREA URNS HPF: ABNORMAL /HPF
ERYTHROCYTE [DISTWIDTH] IN BLOOD BY AUTOMATED COUNT: 16.5 % (ref 11.9–14.6)
FLUAV RNA SPEC QL NAA+PROBE: NOT DETECTED
FLUBV RNA SPEC QL NAA+PROBE: NOT DETECTED
GLOBULIN SER CALC-MCNC: 4.9 G/DL (ref 2.3–3.5)
GLUCOSE SERPL-MCNC: 71 MG/DL (ref 70–99)
GLUCOSE UR STRIP.AUTO-MCNC: NEGATIVE MG/DL
HCT VFR BLD AUTO: 24.7 % (ref 35.8–46.3)
HGB BLD-MCNC: 7.5 G/DL (ref 11.7–15.4)
HGB UR QL STRIP: ABNORMAL
HYALINE CASTS URNS QL MICRO: ABNORMAL /LPF
IMM GRANULOCYTES # BLD AUTO: 0.08 K/UL (ref 0–0.5)
IMM GRANULOCYTES NFR BLD AUTO: 1 % (ref 0–5)
KETONES UR QL STRIP.AUTO: 40 MG/DL
LEUKOCYTE ESTERASE UR QL STRIP.AUTO: NEGATIVE
LYMPHOCYTES # BLD: 0.92 K/UL (ref 0.5–4.6)
LYMPHOCYTES NFR BLD: 11.8 % (ref 13–44)
MAGNESIUM SERPL-MCNC: 1.8 MG/DL (ref 1.8–2.4)
MCH RBC QN AUTO: 27.4 PG (ref 26.1–32.9)
MCHC RBC AUTO-ENTMCNC: 30.4 G/DL (ref 31.4–35)
MCV RBC AUTO: 90.1 FL (ref 82–102)
MONOCYTES # BLD: 0.55 K/UL (ref 0.1–1.3)
MONOCYTES NFR BLD: 7.1 % (ref 4–12)
NEUTS SEG # BLD: 6.21 K/UL (ref 1.7–8.2)
NEUTS SEG NFR BLD: 79.8 % (ref 43–78)
NITRITE UR QL STRIP.AUTO: NEGATIVE
NRBC # BLD: 0 K/UL (ref 0–0.2)
PH UR STRIP: 5.5 (ref 5–9)
PLATELET # BLD AUTO: 426 K/UL (ref 150–450)
PMV BLD AUTO: 8.7 FL (ref 9.4–12.3)
POTASSIUM SERPL-SCNC: 3.3 MMOL/L (ref 3.5–5.1)
PROT SERPL-MCNC: 6.2 G/DL (ref 6.3–8.2)
PROT UR STRIP-MCNC: ABNORMAL MG/DL
RBC # BLD AUTO: 2.74 M/UL (ref 4.05–5.2)
RBC #/AREA URNS HPF: ABNORMAL /HPF
SARS-COV-2 RDRP RESP QL NAA+PROBE: NOT DETECTED
SERVICE CMNT-IMP: NORMAL
SODIUM SERPL-SCNC: 132 MMOL/L (ref 136–145)
SOURCE: NORMAL
SP GR UR REFRACTOMETRY: 1.03 (ref 1–1.02)
UROBILINOGEN UR QL STRIP.AUTO: 1 EU/DL (ref 0.2–1)
WBC # BLD AUTO: 7.8 K/UL (ref 4.3–11.1)
WBC URNS QL MICRO: ABNORMAL /HPF

## 2025-01-18 PROCEDURE — 97112 NEUROMUSCULAR REEDUCATION: CPT

## 2025-01-18 PROCEDURE — 1100000003 HC PRIVATE W/ TELEMETRY

## 2025-01-18 PROCEDURE — 6360000002 HC RX W HCPCS: Performed by: HOSPITALIST

## 2025-01-18 PROCEDURE — 2500000003 HC RX 250 WO HCPCS: Performed by: STUDENT IN AN ORGANIZED HEALTH CARE EDUCATION/TRAINING PROGRAM

## 2025-01-18 PROCEDURE — 87502 INFLUENZA DNA AMP PROBE: CPT

## 2025-01-18 PROCEDURE — 81001 URINALYSIS AUTO W/SCOPE: CPT

## 2025-01-18 PROCEDURE — 36415 COLL VENOUS BLD VENIPUNCTURE: CPT

## 2025-01-18 PROCEDURE — 2580000003 HC RX 258: Performed by: HOSPITALIST

## 2025-01-18 PROCEDURE — 97165 OT EVAL LOW COMPLEX 30 MIN: CPT

## 2025-01-18 PROCEDURE — 85025 COMPLETE CBC W/AUTO DIFF WBC: CPT

## 2025-01-18 PROCEDURE — 80053 COMPREHEN METABOLIC PANEL: CPT

## 2025-01-18 PROCEDURE — 87635 SARS-COV-2 COVID-19 AMP PRB: CPT

## 2025-01-18 PROCEDURE — 2500000003 HC RX 250 WO HCPCS: Performed by: HOSPITALIST

## 2025-01-18 PROCEDURE — 97530 THERAPEUTIC ACTIVITIES: CPT

## 2025-01-18 PROCEDURE — 2580000003 HC RX 258

## 2025-01-18 PROCEDURE — 97161 PT EVAL LOW COMPLEX 20 MIN: CPT

## 2025-01-18 PROCEDURE — 6370000000 HC RX 637 (ALT 250 FOR IP): Performed by: STUDENT IN AN ORGANIZED HEALTH CARE EDUCATION/TRAINING PROGRAM

## 2025-01-18 PROCEDURE — 6370000000 HC RX 637 (ALT 250 FOR IP): Performed by: HOSPITALIST

## 2025-01-18 PROCEDURE — 83735 ASSAY OF MAGNESIUM: CPT

## 2025-01-18 PROCEDURE — 6360000002 HC RX W HCPCS: Performed by: STUDENT IN AN ORGANIZED HEALTH CARE EDUCATION/TRAINING PROGRAM

## 2025-01-18 RX ORDER — 0.9 % SODIUM CHLORIDE 0.9 %
500 INTRAVENOUS SOLUTION INTRAVENOUS ONCE
Status: COMPLETED | OUTPATIENT
Start: 2025-01-18 | End: 2025-01-18

## 2025-01-18 RX ORDER — POTASSIUM CHLORIDE 1500 MG/1
40 TABLET, EXTENDED RELEASE ORAL PRN
Status: DISCONTINUED | OUTPATIENT
Start: 2025-01-18 | End: 2025-01-25 | Stop reason: HOSPADM

## 2025-01-18 RX ORDER — POTASSIUM CHLORIDE 7.45 MG/ML
10 INJECTION INTRAVENOUS PRN
Status: DISCONTINUED | OUTPATIENT
Start: 2025-01-18 | End: 2025-01-25 | Stop reason: HOSPADM

## 2025-01-18 RX ADMIN — METOCLOPRAMIDE 5 MG: 5 INJECTION, SOLUTION INTRAMUSCULAR; INTRAVENOUS at 20:28

## 2025-01-18 RX ADMIN — METOCLOPRAMIDE 5 MG: 5 INJECTION, SOLUTION INTRAMUSCULAR; INTRAVENOUS at 07:40

## 2025-01-18 RX ADMIN — POTASSIUM CHLORIDE 40 MEQ: 1500 TABLET, EXTENDED RELEASE ORAL at 07:35

## 2025-01-18 RX ADMIN — FAMOTIDINE 20 MG: 10 INJECTION, SOLUTION INTRAVENOUS at 07:38

## 2025-01-18 RX ADMIN — SODIUM CHLORIDE, POTASSIUM CHLORIDE, SODIUM LACTATE AND CALCIUM CHLORIDE: 600; 310; 30; 20 INJECTION, SOLUTION INTRAVENOUS at 07:47

## 2025-01-18 RX ADMIN — SODIUM CHLORIDE, PRESERVATIVE FREE 10 ML: 5 INJECTION INTRAVENOUS at 08:48

## 2025-01-18 RX ADMIN — FAMOTIDINE 20 MG: 10 INJECTION, SOLUTION INTRAVENOUS at 20:28

## 2025-01-18 RX ADMIN — PIPERACILLIN AND TAZOBACTAM 3375 MG: 3; .375 INJECTION, POWDER, LYOPHILIZED, FOR SOLUTION INTRAVENOUS at 03:22

## 2025-01-18 RX ADMIN — SODIUM CHLORIDE 500 ML: 9 INJECTION, SOLUTION INTRAVENOUS at 21:10

## 2025-01-18 RX ADMIN — ACETAMINOPHEN 650 MG: 325 TABLET ORAL at 20:27

## 2025-01-18 RX ADMIN — SODIUM CHLORIDE, PRESERVATIVE FREE 10 ML: 5 INJECTION INTRAVENOUS at 20:32

## 2025-01-18 RX ADMIN — SODIUM CHLORIDE, POTASSIUM CHLORIDE, SODIUM LACTATE AND CALCIUM CHLORIDE: 600; 310; 30; 20 INJECTION, SOLUTION INTRAVENOUS at 13:52

## 2025-01-18 RX ADMIN — PIPERACILLIN AND TAZOBACTAM 3375 MG: 3; .375 INJECTION, POWDER, LYOPHILIZED, FOR SOLUTION INTRAVENOUS at 11:11

## 2025-01-18 RX ADMIN — METOCLOPRAMIDE 5 MG: 5 INJECTION, SOLUTION INTRAMUSCULAR; INTRAVENOUS at 13:47

## 2025-01-18 RX ADMIN — WATER 1000 MG: 1 INJECTION INTRAMUSCULAR; INTRAVENOUS; SUBCUTANEOUS at 17:25

## 2025-01-18 RX ADMIN — METOCLOPRAMIDE 5 MG: 5 INJECTION, SOLUTION INTRAMUSCULAR; INTRAVENOUS at 03:18

## 2025-01-18 RX ADMIN — Medication 1 CAPSULE: at 07:38

## 2025-01-18 RX ADMIN — ENOXAPARIN SODIUM 40 MG: 100 INJECTION SUBCUTANEOUS at 17:24

## 2025-01-18 ASSESSMENT — PAIN DESCRIPTION - LOCATION: LOCATION: BREAST

## 2025-01-18 ASSESSMENT — PAIN SCALES - GENERAL
PAINLEVEL_OUTOF10: 0
PAINLEVEL_OUTOF10: 3

## 2025-01-18 ASSESSMENT — PAIN DESCRIPTION - ORIENTATION: ORIENTATION: RIGHT

## 2025-01-18 NOTE — PROGRESS NOTES
ACUTE OCCUPATIONAL THERAPY GOALS:   (Developed with and agreed upon by patient and/or caregiver.)  1. Patient will complete lower body bathing and dressing with MOD I and adaptive equipment as needed.   2.Patient will complete upper body bathing and dressing with MOD I and adaptive equipment as needed.  3. Patient will complete toileting with SUPERVISION.   4. Patient will tolerate 30 minutes of OT treatment with 1-2 rest breaks to increase activity tolerance for ADLs.   5. Patient will complete functional transfers with SUPERVISION and adaptive equipment as needed.   6. Patient will complete functional activity with SUPERVISION and adaptive equipment as needed.  7. Patient will complete simple grooming task in standing with SUPERVISION.    Timeframe: 7 visits      OCCUPATIONAL THERAPY Initial Assessment and Daily Note       OT Visit Days: 1  Acknowledge Orders  Time  OT Charge Capture  Rehab Caseload Tracker      July Kerr is a 64 y.o. female   PRIMARY DIAGNOSIS: Sepsis (HCC)  Septicemia (HCC) [A41.9]  Near syncope [R55]  Acute cystitis without hematuria [N30.00]  Sepsis (HCC) [A41.9]  Procedure(s) (LRB):  BREAST MASTECTOMY BILATERAL WITH RIGHT SENTINEL NODE EXCISION PreOp:          10:00 am  SN:                11:30 am  OR:                1:30 pm (Bilateral)  RIGHT SENTINEL NODE EXCISION (Right)     Reason for Referral: Generalized Muscle Weakness (M62.81)  Other lack of cordination (R27.8)  Difficulty in walking, Not elsewhere classified (R26.2)  Inpatient: Payor: AMBETTER / Plan: AMBETTER / Product Type: *No Product type* /     ASSESSMENT:     REHAB RECOMMENDATIONS:   Recommendation to date pending progress:  Setting:  Home Health Therapy    Equipment:    To Be Determined     ASSESSMENT:  Ms. Kerr presented to the hospital with syncope and generalized weakness--pt was scheduled to have double mastectomy with Dr. 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

## 2025-01-18 NOTE — PROGRESS NOTES
Hospitalist Progress Note   Admit Date:  2025 10:06 AM   Name:  July Kerr   Age:  64 y.o.  Sex:  female  :  1960   MRN:  [id number]   Room:  /    Presenting/Chief Complaint: Loss of Consciousness     Reason(s) for Admission: Septicemia (HCC) [A41.9]  Near syncope [R55]  Acute cystitis without hematuria [N30.00]  Sepsis (HCC) [A41.9]     Hospital Course:   July Kerr is a 64 y.o. 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  Patient states that she has been having intermittent weakness in lower extremities over the past few weeks and on day of arrival she stood up from a seated position and had near syncope.  Of note patient was scheduled to have bilateral mastectomy by Dr. Cabral on day of admission and was n.p.o.  She has had 2 previous hospital admissions with discharges on 2024 and 2025 both due to nausea/vomiting and GI evaluated during hospitalization.    On arrival to the ED notable labs include lactic acid 2.0, Pro-Gopi 2.03, albumin 1.9, WBCs 14.5, UA with 3+ bacteria and leukocyte Estrace.  CT chest was negative for PE but showed large breast mass which has been known.  Patient started on Zosyn for UTI.  CT head showed no acute infarct, hemorrhage, or mass but showed fluid throughout left mastoid air cells.      Subjective & 24hr Events:   Patient evaluated at bedside.  Patient resting in bed at time of exam with family member at bedside.  Patient endorses some continued shortness of breath which she attributes to her large right breast mass.      Assessment & Plan:     Sepsis:  UTI:  -Sepsis criteria met based on tachycardia, leukocytosis, UTI  -UA: 3+ bacteria, leukocyte esterase  -Urine cultures: Greater than 100,000 colonies of normal skin renee  -Initially started on Zosyn, de-escalated to Rocephin on  (EOT )    Near  Absolute 0.00 0.00 - 0.80 K/UL    Basophils Absolute 0.02 0.00 - 0.20 K/UL    Immature Granulocytes Absolute 0.08 0.0 - 0.5 K/UL   Magnesium    Collection Time: 01/18/25  4:25 AM   Result Value Ref Range    Magnesium 1.8 1.8 - 2.4 mg/dL       Recent Labs     01/18/25  0400   COVID19 Not detected       Current Meds:  Current Facility-Administered Medications   Medication Dose Route Frequency    potassium chloride (KLOR-CON M) extended release tablet 40 mEq  40 mEq Oral PRN    Or    potassium bicarb-citric acid (EFFER-K) effervescent tablet 40 mEq  40 mEq Oral PRN    Or    potassium chloride 10 mEq/100 mL IVPB (Peripheral Line)  10 mEq IntraVENous PRN    sodium chloride flush 0.9 % injection 5-40 mL  5-40 mL IntraVENous 2 times per day    sodium chloride flush 0.9 % injection 5-40 mL  5-40 mL IntraVENous PRN    0.9 % sodium chloride infusion   IntraVENous PRN    enoxaparin (LOVENOX) injection 40 mg  40 mg SubCUTAneous QPM    acetaminophen (TYLENOL) tablet 650 mg  650 mg Oral Q4H PRN    Or    acetaminophen (TYLENOL) suppository 650 mg  650 mg Rectal Q6H PRN    lactated ringers infusion   IntraVENous Continuous    famotidine (PEPCID) 20 mg in sodium chloride (PF) 0.9 % 10 mL injection  20 mg IntraVENous BID    piperacillin-tazobactam (ZOSYN) 3,375 mg in sodium chloride 0.9 % 50 mL IVPB (mini-bag)  3,375 mg IntraVENous Q8H    ondansetron (ZOFRAN) injection 4 mg  4 mg IntraVENous Q4H PRN    metoclopramide (REGLAN) injection 5 mg  5 mg IntraVENous Q6H    promethazine (PHENERGAN) tablet 25 mg  25 mg Oral Q6H PRN    loperamide (IMODIUM) capsule 2 mg  2 mg Oral 4x Daily PRN    lactobacillus (CULTURELLE) capsule 1 capsule  1 capsule Oral Daily with breakfast       Signed:  Tyrone Rios DO    Part of this note may have been written by using a voice dictation software.  The note has been proof read but may still contain some grammatical/other typographical errors.

## 2025-01-18 NOTE — THERAPY EVALUATION
ACUTE PHYSICAL THERAPY GOALS:   (Developed with and agreed upon by patient and/or caregiver.)    (1.) July Kerr  will move from supine to sit and sit to supine  and scoot up and down with INDEPENDENCE within 7 treatment day(s).    (2.) July Kerr will transfer from bed to chair and chair to bed with SUPERVISION using the least restrictive device within 7 treatment day(s).    (3.) July Kerr will ambulate with SUPERVISION for 50 feet with the least restrictive device within 7 treatment day(s).   (4.) July Kerr will perform standing static and dynamic balance activities x 10 minutes with SUPERVISION to improve safety within 7 treatment day(s).  (5.) July Kerr will perform therapeutic exercises x 15 min for HEP with INDEPENDENCE to improve strength, endurance, and functional mobility within 7 treatment day(s).       PHYSICAL THERAPY Initial Assessment, Daily Note, and AM  (Link to Caseload Tracking: PT Visit Days : 1  Acknowledge Orders  Time In/Out  PT Charge Capture  Rehab Caseload Tracker    July Kerr is a 64 y.o. female   PRIMARY DIAGNOSIS: Sepsis (HCC)  Septicemia (HCC) [A41.9]  Near syncope [R55]  Acute cystitis without hematuria [N30.00]  Sepsis (HCC) [A41.9]  Procedure(s) (LRB):  BREAST MASTECTOMY BILATERAL WITH RIGHT SENTINEL NODE EXCISION PreOp:          10:00 am  SN:                11:30 am  OR:                1:30 pm (Bilateral)  RIGHT SENTINEL NODE EXCISION (Right)     Reason for Referral: Generalized Muscle Weakness (M62.81)  Difficulty in walking, Not elsewhere classified (R26.2)  Inpatient: Payor: AMBETTER / Plan: AMBETTER / Product Type: *No Product type* /     ASSESSMENT:     REHAB RECOMMENDATIONS:   Recommendation to date pending progress:  Setting:  Home Health Therapy    Equipment:    To Be Determined     ASSESSMENT:  Ms. 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

## 2025-01-19 LAB
ALBUMIN SERPL-MCNC: 1.3 G/DL (ref 3.2–4.6)
ALBUMIN/GLOB SERPL: 0.3 (ref 1–1.9)
ALP SERPL-CCNC: 136 U/L (ref 35–104)
ALT SERPL-CCNC: 10 U/L (ref 8–45)
ANION GAP SERPL CALC-SCNC: 13 MMOL/L (ref 7–16)
AST SERPL-CCNC: 27 U/L (ref 15–37)
BASOPHILS # BLD: 0.03 K/UL (ref 0–0.2)
BASOPHILS NFR BLD: 0.3 % (ref 0–2)
BILIRUB SERPL-MCNC: 0.6 MG/DL (ref 0–1.2)
BUN SERPL-MCNC: 5 MG/DL (ref 8–23)
CALCIUM SERPL-MCNC: 8.6 MG/DL (ref 8.8–10.2)
CHLORIDE SERPL-SCNC: 95 MMOL/L (ref 98–107)
CO2 SERPL-SCNC: 23 MMOL/L (ref 20–29)
CREAT SERPL-MCNC: 0.33 MG/DL (ref 0.6–1.1)
DIFFERENTIAL METHOD BLD: ABNORMAL
EOSINOPHIL # BLD: 0 K/UL (ref 0–0.8)
EOSINOPHIL NFR BLD: 0 % (ref 0.5–7.8)
ERYTHROCYTE [DISTWIDTH] IN BLOOD BY AUTOMATED COUNT: 16.7 % (ref 11.9–14.6)
FERRITIN SERPL-MCNC: 1385 NG/ML (ref 8–388)
GLOBULIN SER CALC-MCNC: 5.1 G/DL (ref 2.3–3.5)
GLUCOSE SERPL-MCNC: 90 MG/DL (ref 70–99)
HCT VFR BLD AUTO: 24.5 % (ref 35.8–46.3)
HGB BLD-MCNC: 7.4 G/DL (ref 11.7–15.4)
IMM GRANULOCYTES # BLD AUTO: 0.12 K/UL (ref 0–0.5)
IMM GRANULOCYTES NFR BLD AUTO: 1.1 % (ref 0–5)
IRON SATN MFR SERPL: 13 % (ref 20–50)
IRON SERPL-MCNC: 12 UG/DL (ref 35–100)
LYMPHOCYTES # BLD: 1.17 K/UL (ref 0.5–4.6)
LYMPHOCYTES NFR BLD: 11.1 % (ref 13–44)
MCH RBC QN AUTO: 27.3 PG (ref 26.1–32.9)
MCHC RBC AUTO-ENTMCNC: 30.2 G/DL (ref 31.4–35)
MCV RBC AUTO: 90.4 FL (ref 82–102)
MONOCYTES # BLD: 0.76 K/UL (ref 0.1–1.3)
MONOCYTES NFR BLD: 7.2 % (ref 4–12)
NEUTS SEG # BLD: 8.47 K/UL (ref 1.7–8.2)
NEUTS SEG NFR BLD: 80.3 % (ref 43–78)
NRBC # BLD: 0 K/UL (ref 0–0.2)
PLATELET # BLD AUTO: 469 K/UL (ref 150–450)
PMV BLD AUTO: 8.9 FL (ref 9.4–12.3)
POTASSIUM SERPL-SCNC: 3.8 MMOL/L (ref 3.5–5.1)
PROT SERPL-MCNC: 6.5 G/DL (ref 6.3–8.2)
RBC # BLD AUTO: 2.71 M/UL (ref 4.05–5.2)
SODIUM SERPL-SCNC: 131 MMOL/L (ref 136–145)
TIBC SERPL-MCNC: 91 UG/DL (ref 240–450)
UIBC SERPL-MCNC: 78.7 UG/DL (ref 112–347)
WBC # BLD AUTO: 10.6 K/UL (ref 4.3–11.1)

## 2025-01-19 PROCEDURE — 1100000003 HC PRIVATE W/ TELEMETRY

## 2025-01-19 PROCEDURE — 6360000002 HC RX W HCPCS: Performed by: HOSPITALIST

## 2025-01-19 PROCEDURE — 6360000002 HC RX W HCPCS: Performed by: STUDENT IN AN ORGANIZED HEALTH CARE EDUCATION/TRAINING PROGRAM

## 2025-01-19 PROCEDURE — 82728 ASSAY OF FERRITIN: CPT

## 2025-01-19 PROCEDURE — 83550 IRON BINDING TEST: CPT

## 2025-01-19 PROCEDURE — 6370000000 HC RX 637 (ALT 250 FOR IP): Performed by: HOSPITALIST

## 2025-01-19 PROCEDURE — 2500000003 HC RX 250 WO HCPCS: Performed by: HOSPITALIST

## 2025-01-19 PROCEDURE — 2580000003 HC RX 258: Performed by: HOSPITALIST

## 2025-01-19 PROCEDURE — 2500000003 HC RX 250 WO HCPCS: Performed by: STUDENT IN AN ORGANIZED HEALTH CARE EDUCATION/TRAINING PROGRAM

## 2025-01-19 PROCEDURE — 85025 COMPLETE CBC W/AUTO DIFF WBC: CPT

## 2025-01-19 PROCEDURE — 83540 ASSAY OF IRON: CPT

## 2025-01-19 PROCEDURE — 80053 COMPREHEN METABOLIC PANEL: CPT

## 2025-01-19 PROCEDURE — 6370000000 HC RX 637 (ALT 250 FOR IP): Performed by: STUDENT IN AN ORGANIZED HEALTH CARE EDUCATION/TRAINING PROGRAM

## 2025-01-19 PROCEDURE — 36415 COLL VENOUS BLD VENIPUNCTURE: CPT

## 2025-01-19 PROCEDURE — 2580000003 HC RX 258: Performed by: STUDENT IN AN ORGANIZED HEALTH CARE EDUCATION/TRAINING PROGRAM

## 2025-01-19 RX ORDER — SODIUM FERRIC GLUCONATE COMPLEX IN SUCROSE 12.5 MG/ML
125 INJECTION INTRAVENOUS ONCE
Status: COMPLETED | OUTPATIENT
Start: 2025-01-19 | End: 2025-01-19

## 2025-01-19 RX ORDER — FAMOTIDINE 20 MG/1
20 TABLET, FILM COATED ORAL 2 TIMES DAILY
Status: DISCONTINUED | OUTPATIENT
Start: 2025-01-19 | End: 2025-01-25 | Stop reason: HOSPADM

## 2025-01-19 RX ORDER — SODIUM CHLORIDE 9 MG/ML
INJECTION, SOLUTION INTRAVENOUS CONTINUOUS
Status: DISCONTINUED | OUTPATIENT
Start: 2025-01-19 | End: 2025-01-23

## 2025-01-19 RX ORDER — FERROUS SULFATE 325(65) MG
325 TABLET ORAL
Status: DISCONTINUED | OUTPATIENT
Start: 2025-01-20 | End: 2025-01-25 | Stop reason: HOSPADM

## 2025-01-19 RX ORDER — SODIUM CHLORIDE 9 MG/ML
INJECTION, SOLUTION INTRAVENOUS CONTINUOUS
Status: DISCONTINUED | OUTPATIENT
Start: 2025-01-19 | End: 2025-01-19

## 2025-01-19 RX ADMIN — Medication 1 CAPSULE: at 08:11

## 2025-01-19 RX ADMIN — ACETAMINOPHEN 650 MG: 325 TABLET ORAL at 06:06

## 2025-01-19 RX ADMIN — ACETAMINOPHEN 650 MG: 325 TABLET ORAL at 17:31

## 2025-01-19 RX ADMIN — SODIUM CHLORIDE: 9 INJECTION, SOLUTION INTRAVENOUS at 18:21

## 2025-01-19 RX ADMIN — ENOXAPARIN SODIUM 40 MG: 100 INJECTION SUBCUTANEOUS at 17:25

## 2025-01-19 RX ADMIN — SODIUM FERRIC GLUCONATE COMPLEX 125 MG: 12.5 INJECTION INTRAVENOUS at 08:22

## 2025-01-19 RX ADMIN — SODIUM CHLORIDE, PRESERVATIVE FREE 10 ML: 5 INJECTION INTRAVENOUS at 21:06

## 2025-01-19 RX ADMIN — FAMOTIDINE 20 MG: 20 TABLET, FILM COATED ORAL at 21:05

## 2025-01-19 RX ADMIN — METOCLOPRAMIDE 5 MG: 5 INJECTION, SOLUTION INTRAMUSCULAR; INTRAVENOUS at 00:50

## 2025-01-19 RX ADMIN — SODIUM CHLORIDE: 9 INJECTION, SOLUTION INTRAVENOUS at 06:04

## 2025-01-19 RX ADMIN — METOCLOPRAMIDE 5 MG: 5 INJECTION, SOLUTION INTRAMUSCULAR; INTRAVENOUS at 08:12

## 2025-01-19 RX ADMIN — WATER 1000 MG: 1 INJECTION INTRAMUSCULAR; INTRAVENOUS; SUBCUTANEOUS at 17:33

## 2025-01-19 RX ADMIN — SODIUM CHLORIDE, PRESERVATIVE FREE 10 ML: 5 INJECTION INTRAVENOUS at 09:33

## 2025-01-19 RX ADMIN — FAMOTIDINE 20 MG: 10 INJECTION, SOLUTION INTRAVENOUS at 08:14

## 2025-01-19 RX ADMIN — SODIUM CHLORIDE: 9 INJECTION, SOLUTION INTRAVENOUS at 18:28

## 2025-01-19 ASSESSMENT — PAIN SCALES - GENERAL
PAINLEVEL_OUTOF10: 8
PAINLEVEL_OUTOF10: 0

## 2025-01-19 ASSESSMENT — PAIN DESCRIPTION - LOCATION: LOCATION: BREAST

## 2025-01-19 ASSESSMENT — PAIN DESCRIPTION - ORIENTATION: ORIENTATION: RIGHT

## 2025-01-19 ASSESSMENT — PAIN DESCRIPTION - DESCRIPTORS: DESCRIPTORS: BURNING

## 2025-01-19 NOTE — PROGRESS NOTES
Writer communication with Maritza Zaomra:    Patient Reason for Admission: Septicemia; Acute cystitis without hematuria. Currently on remote tele. HR trending up 110's-120's. Pt denies symptoms. Fluids discontinued today. Please advise.     NP placed order for NS bolus

## 2025-01-19 NOTE — PROGRESS NOTES
Hospitalist Progress Note   Admit Date:  2025 10:06 AM   Name:  July Kerr   Age:  64 y.o.  Sex:  female  :  1960   MRN:  [id number]   Room:  /    Presenting/Chief Complaint: Loss of Consciousness     Reason(s) for Admission: Septicemia (HCC) [A41.9]  Near syncope [R55]  Acute cystitis without hematuria [N30.00]  Sepsis (HCC) [A41.9]     Hospital Course:   July Kerr is a 64 y.o. 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  Patient states that she has been having intermittent weakness in lower extremities over the past few weeks and on day of arrival she stood up from a seated position and had near syncope.  Of note patient was scheduled to have bilateral mastectomy by Dr. Cabral on day of admission and was n.p.o.  She has had 2 previous hospital admissions with discharges on 2024 and 2025 both due to nausea/vomiting and GI evaluated during hospitalization.    On arrival to the ED notable labs include lactic acid 2.0, Pro-Gopi 2.03, albumin 1.9, WBCs 14.5, UA with 3+ bacteria and leukocyte Estrace.  CT chest was negative for PE but showed large breast mass which has been known.  Patient started on Zosyn for UTI, de-escalated to Rocephin.  CT head showed no acute infarct, hemorrhage, or mass but showed fluid throughout left mastoid air cells.  PT/OT consulted, recommend home health.      Subjective & 24hr Events:   Patient evaluated at bedside.  Patient resting in bed at time of exam with family member at bedside.  Patient endorses decreased p.o. intake secondary to lack of appetite.  Sinus tachycardia noted between 110-120.  Patient given IV iron this morning.  Continues to endorse generalized weakness but did work with physical therapy yesterday.      Assessment & Plan:     Sepsis:  UTI:  -Sepsis criteria met based on tachycardia, leukocytosis,  injection  20 mg IntraVENous BID    ondansetron (ZOFRAN) injection 4 mg  4 mg IntraVENous Q4H PRN    metoclopramide (REGLAN) injection 5 mg  5 mg IntraVENous Q6H    promethazine (PHENERGAN) tablet 25 mg  25 mg Oral Q6H PRN    loperamide (IMODIUM) capsule 2 mg  2 mg Oral 4x Daily PRN    lactobacillus (CULTURELLE) capsule 1 capsule  1 capsule Oral Daily with breakfast       Signed:  Tyrone Rios DO    Part of this note may have been written by using a voice dictation software.  The note has been proof read but may still contain some grammatical/other typographical errors.

## 2025-01-20 ENCOUNTER — APPOINTMENT (OUTPATIENT)
Dept: GENERAL RADIOLOGY | Age: 65
End: 2025-01-20
Payer: COMMERCIAL

## 2025-01-20 PROBLEM — N63.10 MASS OF RIGHT BREAST: Status: ACTIVE | Noted: 2025-01-02

## 2025-01-20 LAB
ALBUMIN SERPL-MCNC: 1.3 G/DL (ref 3.2–4.6)
ALBUMIN/GLOB SERPL: 0.2 (ref 1–1.9)
ALP SERPL-CCNC: 138 U/L (ref 35–104)
ALT SERPL-CCNC: 7 U/L (ref 8–45)
ANION GAP SERPL CALC-SCNC: 13 MMOL/L (ref 7–16)
AST SERPL-CCNC: 28 U/L (ref 15–37)
BASOPHILS # BLD: 0.04 K/UL (ref 0–0.2)
BASOPHILS NFR BLD: 0.4 % (ref 0–2)
BILIRUB SERPL-MCNC: 0.5 MG/DL (ref 0–1.2)
BUN SERPL-MCNC: 5 MG/DL (ref 8–23)
CALCIUM SERPL-MCNC: 8.1 MG/DL (ref 8.8–10.2)
CHLORIDE SERPL-SCNC: 96 MMOL/L (ref 98–107)
CO2 SERPL-SCNC: 22 MMOL/L (ref 20–29)
CREAT SERPL-MCNC: 0.24 MG/DL (ref 0.6–1.1)
DIFFERENTIAL METHOD BLD: ABNORMAL
EOSINOPHIL # BLD: 0 K/UL (ref 0–0.8)
EOSINOPHIL NFR BLD: 0 % (ref 0.5–7.8)
ERYTHROCYTE [DISTWIDTH] IN BLOOD BY AUTOMATED COUNT: 16.8 % (ref 11.9–14.6)
GLOBULIN SER CALC-MCNC: 5 G/DL (ref 2.3–3.5)
GLUCOSE SERPL-MCNC: 84 MG/DL (ref 70–99)
HCT VFR BLD AUTO: 24.4 % (ref 35.8–46.3)
HGB BLD-MCNC: 7.3 G/DL (ref 11.7–15.4)
IMM GRANULOCYTES # BLD AUTO: 0.14 K/UL (ref 0–0.5)
IMM GRANULOCYTES NFR BLD AUTO: 1.2 % (ref 0–5)
LYMPHOCYTES # BLD: 1.06 K/UL (ref 0.5–4.6)
LYMPHOCYTES NFR BLD: 9.4 % (ref 13–44)
MAGNESIUM SERPL-MCNC: 1.7 MG/DL (ref 1.8–2.4)
MCH RBC QN AUTO: 27.1 PG (ref 26.1–32.9)
MCHC RBC AUTO-ENTMCNC: 29.9 G/DL (ref 31.4–35)
MCV RBC AUTO: 90.7 FL (ref 82–102)
MONOCYTES # BLD: 0.71 K/UL (ref 0.1–1.3)
MONOCYTES NFR BLD: 6.3 % (ref 4–12)
NEUTS SEG # BLD: 9.31 K/UL (ref 1.7–8.2)
NEUTS SEG NFR BLD: 82.7 % (ref 43–78)
NRBC # BLD: 0 K/UL (ref 0–0.2)
PLATELET # BLD AUTO: 450 K/UL (ref 150–450)
PMV BLD AUTO: 9.2 FL (ref 9.4–12.3)
POTASSIUM SERPL-SCNC: 3.7 MMOL/L (ref 3.5–5.1)
PROCALCITONIN SERPL-MCNC: 0.86 NG/ML (ref 0–0.1)
PROT SERPL-MCNC: 6.3 G/DL (ref 6.3–8.2)
RBC # BLD AUTO: 2.69 M/UL (ref 4.05–5.2)
SODIUM SERPL-SCNC: 131 MMOL/L (ref 136–145)
WBC # BLD AUTO: 11.3 K/UL (ref 4.3–11.1)

## 2025-01-20 PROCEDURE — 6370000000 HC RX 637 (ALT 250 FOR IP): Performed by: HOSPITALIST

## 2025-01-20 PROCEDURE — 71045 X-RAY EXAM CHEST 1 VIEW: CPT

## 2025-01-20 PROCEDURE — 1100000003 HC PRIVATE W/ TELEMETRY

## 2025-01-20 PROCEDURE — 83735 ASSAY OF MAGNESIUM: CPT

## 2025-01-20 PROCEDURE — 2580000003 HC RX 258: Performed by: HOSPITALIST

## 2025-01-20 PROCEDURE — 6360000002 HC RX W HCPCS: Performed by: STUDENT IN AN ORGANIZED HEALTH CARE EDUCATION/TRAINING PROGRAM

## 2025-01-20 PROCEDURE — 6370000000 HC RX 637 (ALT 250 FOR IP): Performed by: STUDENT IN AN ORGANIZED HEALTH CARE EDUCATION/TRAINING PROGRAM

## 2025-01-20 PROCEDURE — 6360000002 HC RX W HCPCS: Performed by: HOSPITALIST

## 2025-01-20 PROCEDURE — 80053 COMPREHEN METABOLIC PANEL: CPT

## 2025-01-20 PROCEDURE — 36415 COLL VENOUS BLD VENIPUNCTURE: CPT

## 2025-01-20 PROCEDURE — 85025 COMPLETE CBC W/AUTO DIFF WBC: CPT

## 2025-01-20 PROCEDURE — 97530 THERAPEUTIC ACTIVITIES: CPT

## 2025-01-20 PROCEDURE — 97535 SELF CARE MNGMENT TRAINING: CPT

## 2025-01-20 PROCEDURE — 84145 PROCALCITONIN (PCT): CPT

## 2025-01-20 PROCEDURE — 87040 BLOOD CULTURE FOR BACTERIA: CPT

## 2025-01-20 PROCEDURE — 2580000003 HC RX 258: Performed by: STUDENT IN AN ORGANIZED HEALTH CARE EDUCATION/TRAINING PROGRAM

## 2025-01-20 RX ADMIN — Medication 1 CAPSULE: at 08:17

## 2025-01-20 RX ADMIN — FAMOTIDINE 20 MG: 20 TABLET, FILM COATED ORAL at 08:17

## 2025-01-20 RX ADMIN — ENOXAPARIN SODIUM 40 MG: 100 INJECTION SUBCUTANEOUS at 18:06

## 2025-01-20 RX ADMIN — FAMOTIDINE 20 MG: 20 TABLET, FILM COATED ORAL at 20:57

## 2025-01-20 RX ADMIN — FERROUS SULFATE TAB 325 MG (65 MG ELEMENTAL FE) 325 MG: 325 (65 FE) TAB at 08:17

## 2025-01-20 RX ADMIN — SODIUM CHLORIDE 3000 MG: 900 INJECTION INTRAVENOUS at 11:34

## 2025-01-20 RX ADMIN — SODIUM CHLORIDE: 9 INJECTION, SOLUTION INTRAVENOUS at 11:31

## 2025-01-20 RX ADMIN — ACETAMINOPHEN 650 MG: 325 TABLET ORAL at 08:17

## 2025-01-20 RX ADMIN — SODIUM CHLORIDE 3000 MG: 900 INJECTION INTRAVENOUS at 22:31

## 2025-01-20 RX ADMIN — SODIUM CHLORIDE 3000 MG: 900 INJECTION INTRAVENOUS at 18:06

## 2025-01-20 ASSESSMENT — PAIN SCALES - GENERAL: PAINLEVEL_OUTOF10: 0

## 2025-01-20 NOTE — PROGRESS NOTES
ACUTE OCCUPATIONAL THERAPY GOALS:   (Developed with and agreed upon by patient and/or caregiver.)  1. Patient will complete lower body bathing and dressing with MOD I and adaptive equipment as needed.   2.Patient will complete upper body bathing and dressing with MOD I and adaptive equipment as needed.  3. Patient will complete toileting with SUPERVISION.   4. Patient will tolerate 30 minutes of OT treatment with 1-2 rest breaks to increase activity tolerance for ADLs.   5. Patient will complete functional transfers with SUPERVISION and adaptive equipment as needed.   6. Patient will complete functional activity with SUPERVISION and adaptive equipment as needed.  7. Patient will complete simple grooming task in standing with SUPERVISION.     Timeframe: 7 visits           OCCUPATIONAL THERAPY: Daily Note PM   OT Visit Days: 2   Time In/Out  OT Charge Capture  Rehab Caseload Tracker  OT Orders    July Kerr is a 64 y.o. female   PRIMARY DIAGNOSIS: Sepsis (HCC)  Septicemia (HCC) [A41.9]  Near syncope [R55]  Acute cystitis without hematuria [N30.00]  Sepsis (HCC) [A41.9]  Procedure(s) (LRB):  BREAST MASTECTOMY BILATERAL WITH RIGHT SENTINEL NODE EXCISION PreOp:          10:00 am  SN:                11:30 am  OR:                1:30 pm (Bilateral)  RIGHT SENTINEL NODE EXCISION (Right)     Inpatient: Payor: AMBETTER / Plan: AMBETTER / Product Type: *No Product type* /     ASSESSMENT:     REHAB RECOMMENDATIONS:   Recommendation to date pending progress:  Setting:  Home Health Therapy    Equipment:    To Be Determined     ASSESSMENT:  Ms. Kerr was received supine in bed with mother present. Pt required assistance for functional mobility and ADLs today due to back pain from enlarged R breast compared to L, fatigue with exertion. Pt required multiple standing rest breaks in hallway today. Pt did sit to complete ADLs today. Non-symptomatic tachycardia today, resting HR 110s and 140s with activity. Pt is progressing  To: Patient  Education Provided: Role of Therapy;Plan of Care;Fall Prevention Strategies  Education Outcome: Verbalized understanding;Demonstrated understanding    TOTAL TREATMENT DURATION AND TIME:  Time In: 1332  Time Out: 1406  Minutes: 34    ALICIA REID, OT

## 2025-01-20 NOTE — PROGRESS NOTES
MPV 9.2 (L) 9.4 - 12.3 FL    nRBC 0.00 0.0 - 0.2 K/uL    Differential Type AUTOMATED      Neutrophils % 82.7 (H) 43.0 - 78.0 %    Lymphocytes % 9.4 (L) 13.0 - 44.0 %    Monocytes % 6.3 4.0 - 12.0 %    Eosinophils % 0.0 (L) 0.5 - 7.8 %    Basophils % 0.4 0.0 - 2.0 %    Immature Granulocytes % 1.2 0.0 - 5.0 %    Neutrophils Absolute 9.31 (H) 1.70 - 8.20 K/UL    Lymphocytes Absolute 1.06 0.50 - 4.60 K/UL    Monocytes Absolute 0.71 0.10 - 1.30 K/UL    Eosinophils Absolute 0.00 0.00 - 0.80 K/UL    Basophils Absolute 0.04 0.00 - 0.20 K/UL    Immature Granulocytes Absolute 0.14 0.0 - 0.5 K/UL   Procalcitonin    Collection Time: 01/20/25  4:08 AM   Result Value Ref Range    Procalcitonin 0.86 (H) 0.00 - 0.10 ng/mL   Magnesium    Collection Time: 01/20/25  4:08 AM   Result Value Ref Range    Magnesium 1.7 (L) 1.8 - 2.4 mg/dL       Recent Labs     01/18/25  0400   COVID19 Not detected       Current Meds:  Current Facility-Administered Medications   Medication Dose Route Frequency    ampicillin-sulbactam (UNASYN) 3,000 mg in sodium chloride 0.9 % 100 mL IVPB (mini-bag)  3,000 mg IntraVENous Q6H    ferrous sulfate (IRON 325) tablet 325 mg  325 mg Oral Daily with breakfast    famotidine (PEPCID) tablet 20 mg  20 mg Oral BID    0.9 % sodium chloride infusion   IntraVENous Continuous    potassium chloride (KLOR-CON M) extended release tablet 40 mEq  40 mEq Oral PRN    Or    potassium bicarb-citric acid (EFFER-K) effervescent tablet 40 mEq  40 mEq Oral PRN    Or    potassium chloride 10 mEq/100 mL IVPB (Peripheral Line)  10 mEq IntraVENous PRN    sodium chloride flush 0.9 % injection 5-40 mL  5-40 mL IntraVENous 2 times per day    sodium chloride flush 0.9 % injection 5-40 mL  5-40 mL IntraVENous PRN    0.9 % sodium chloride infusion   IntraVENous PRN    enoxaparin (LOVENOX) injection 40 mg  40 mg SubCUTAneous QPM    acetaminophen (TYLENOL) tablet 650 mg  650 mg Oral Q4H PRN    Or    acetaminophen (TYLENOL) suppository 650 mg

## 2025-01-20 NOTE — PROGRESS NOTES
END OF SHIFT NOTE: Requesting to start Doculax    INTAKE/OUTPUT  01/19 0701 - 01/20 0700  In: 1914.6 [P.O.:472; I.V.:1442.6]  Out: 2700 [Urine:2700]  Voiding: Yes  Catheter: No  Drain:   External Urinary Catheter (Active)   Site Assessment Clean,dry & intact 01/19/25 0941   Placement Replaced 01/19/25 0941   Securement Method Securing device (Describe) 01/19/25 0941   Catheter Care Catheter/Wick replaced;Suction Canister/Tubing changed 01/19/25 0941   Perineal Care Yes 01/19/25 0941   Suction 40 mmgHg continuous 01/19/25 0941   Urine Color Jesenia 01/19/25 1808   Urine Appearance Clear 01/19/25 1808   Output (mL) 550 mL 01/20/25 0320               Flatus: Patient does have flatus present.    Stool:  occurrences.    Characteristics:           Stool Assessment  Last BM (including prior to admit): 01/19/25    Emesis:  occurrences.    Characteristics:        VITAL SIGNS  Patient Vitals for the past 12 hrs:   Temp Pulse Resp BP SpO2   01/20/25 0320 98.6 °F (37 °C) (!) 120 17 112/60 97 %   01/19/25 2305 99.1 °F (37.3 °C) (!) 108 17 106/66 97 %   01/19/25 1908 99.3 °F (37.4 °C) (!) 115 18 (!) 108/54 94 %       Pain Assessment  Pain Level: 8 (pt states tylenol helps the burning) (01/19/25 7562)  Pain Location: Breast  Patient's Stated Pain Goal: 0 - No pain    Ambulating  No    Shift report given to oncoming nurse at the bedside.    Chelita Starks RN

## 2025-01-20 NOTE — PROGRESS NOTES
ACUTE PHYSICAL THERAPY GOALS:   (Developed with and agreed upon by patient and/or caregiver.)  (1.) July Kerr  will move from supine to sit and sit to supine  and scoot up and down with INDEPENDENCE within 7 treatment day(s).    (2.) July Kerr will transfer from bed to chair and chair to bed with SUPERVISION using the least restrictive device within 7 treatment day(s).    (3.) July Kerr will ambulate with SUPERVISION for 50 feet with the least restrictive device within 7 treatment day(s).   (4.) July Kerr will perform standing static and dynamic balance activities x 10 minutes with SUPERVISION to improve safety within 7 treatment day(s).  (5.) July Kerr will perform therapeutic exercises x 15 min for HEP with INDEPENDENCE to improve strength, endurance, and functional mobility within 7 treatment day(s).     PHYSICAL THERAPY: Daily Note PM   (Link to Caseload Tracking: PT Visit Days : 2  Time In/Out PT Charge Capture  Rehab Caseload Tracker  Orders    July Kerr is a 64 y.o. female   PRIMARY DIAGNOSIS: Sepsis (HCC)  Septicemia (HCC) [A41.9]  Near syncope [R55]  Acute cystitis without hematuria [N30.00]  Sepsis (HCC) [A41.9]  Procedure(s) (LRB):  BREAST MASTECTOMY BILATERAL WITH RIGHT SENTINEL NODE EXCISION PreOp:          10:00 am  SN:                11:30 am  OR:                1:30 pm (Bilateral)  RIGHT SENTINEL NODE EXCISION (Right)     Inpatient: Payor: AMBETTER / Plan: AMBETTER / Product Type: *No Product type* /     ASSESSMENT:     REHAB RECOMMENDATIONS:   Recommendation to date pending progress:  Setting:  Home Health Therapy    Equipment:    None     ASSESSMENT:  Ms. Kerr in good spirits today. Okay to treat per RN and pt is agreeable. She completed bed mobility with CGA. She transferred sit to stand with min/CGA to RW. She ambulated x 225 ft with RW and CGA with 3 standing rest breaks. She returned to room and left sitting up in chair with mother present. All needs in

## 2025-01-20 NOTE — CARE COORDINATION
RNCM met with patient and mother in room 212 to discuss discharge planning.    Patient lives with mother in own home. Patient completes ADLs independently at baseline and ambulates with a rolling walker as needed. Patient has no current home care services. Demographics and PCP verified. CM following.       01/20/25 1525   Service Assessment   Patient Orientation Alert and Oriented   Cognition Alert   History Provided By Patient;Medical Record   Primary Caregiver Self   Accompanied By/Relationship Mother   Support Systems Family Members   Patient's Healthcare Decision Maker is: Legal Next of Kin   PCP Verified by CM Yes   Last Visit to PCP Within last 6 months   Prior Functional Level Independent in ADLs/IADLs   Current Functional Level Independent in ADLs/IADLs   Can patient return to prior living arrangement Yes   Ability to make needs known: Good   Family able to assist with home care needs: Yes   Would you like for me to discuss the discharge plan with any other family members/significant others, and if so, who? Yes   Financial Resources Other (Comment)  (commercial insurance)   Community Resources None   Social/Functional History   Lives With Parent   Type of Home House   Home Layout One level   Home Equipment Walker - Rolling   Receives Help From Family   Prior Level of Assist for ADLs Independent   Ambulation Assistance Independent   Prior Level of Assist for Transfers Independent   Active  Yes   Discharge Planning   Type of Residence House   Living Arrangements Parent   Current Services Prior To Admission None   DME Ordered? No   Potential Assistance Purchasing Medications No   Type of Home Care Services None   Patient expects to be discharged to: House   One/Two Story Residence One story   History of falls? 0

## 2025-01-20 NOTE — PROGRESS NOTES
IS education. Pt demonstrated with the hold, to approx 900. Advised to use IS 10 times and hour while awake. Reports understanding and intended compliance.

## 2025-01-21 LAB
ANION GAP SERPL CALC-SCNC: 14 MMOL/L (ref 7–16)
BASOPHILS # BLD: 0.03 K/UL (ref 0–0.2)
BASOPHILS NFR BLD: 0.3 % (ref 0–2)
BUN SERPL-MCNC: 5 MG/DL (ref 8–23)
CALCIUM SERPL-MCNC: 8.4 MG/DL (ref 8.8–10.2)
CHLORIDE SERPL-SCNC: 95 MMOL/L (ref 98–107)
CO2 SERPL-SCNC: 23 MMOL/L (ref 20–29)
CREAT SERPL-MCNC: 0.31 MG/DL (ref 0.6–1.1)
DIFFERENTIAL METHOD BLD: ABNORMAL
EKG ATRIAL RATE: 119 BPM
EKG DIAGNOSIS: NORMAL
EKG P AXIS: 50 DEGREES
EKG P-R INTERVAL: 140 MS
EKG Q-T INTERVAL: 340 MS
EKG QRS DURATION: 74 MS
EKG QTC CALCULATION (BAZETT): 478 MS
EKG R AXIS: -10 DEGREES
EKG T AXIS: 37 DEGREES
EKG VENTRICULAR RATE: 119 BPM
EOSINOPHIL # BLD: 0.01 K/UL (ref 0–0.8)
EOSINOPHIL NFR BLD: 0.1 % (ref 0.5–7.8)
ERYTHROCYTE [DISTWIDTH] IN BLOOD BY AUTOMATED COUNT: 16.8 % (ref 11.9–14.6)
GLUCOSE SERPL-MCNC: 82 MG/DL (ref 70–99)
HCT VFR BLD AUTO: 24.3 % (ref 35.8–46.3)
HGB BLD-MCNC: 7.2 G/DL (ref 11.7–15.4)
IMM GRANULOCYTES # BLD AUTO: 0.16 K/UL (ref 0–0.5)
IMM GRANULOCYTES NFR BLD AUTO: 1.5 % (ref 0–5)
LYMPHOCYTES # BLD: 1.41 K/UL (ref 0.5–4.6)
LYMPHOCYTES NFR BLD: 12.9 % (ref 13–44)
MCH RBC QN AUTO: 27.1 PG (ref 26.1–32.9)
MCHC RBC AUTO-ENTMCNC: 29.6 G/DL (ref 31.4–35)
MCV RBC AUTO: 91.4 FL (ref 82–102)
MONOCYTES # BLD: 0.71 K/UL (ref 0.1–1.3)
MONOCYTES NFR BLD: 6.5 % (ref 4–12)
NEUTS SEG # BLD: 8.63 K/UL (ref 1.7–8.2)
NEUTS SEG NFR BLD: 78.7 % (ref 43–78)
NRBC # BLD: 0 K/UL (ref 0–0.2)
PLATELET # BLD AUTO: 484 K/UL (ref 150–450)
PMV BLD AUTO: 9.1 FL (ref 9.4–12.3)
POTASSIUM SERPL-SCNC: 4 MMOL/L (ref 3.5–5.1)
RBC # BLD AUTO: 2.66 M/UL (ref 4.05–5.2)
SODIUM SERPL-SCNC: 132 MMOL/L (ref 136–145)
WBC # BLD AUTO: 11 K/UL (ref 4.3–11.1)

## 2025-01-21 PROCEDURE — 97530 THERAPEUTIC ACTIVITIES: CPT

## 2025-01-21 PROCEDURE — 1100000003 HC PRIVATE W/ TELEMETRY

## 2025-01-21 PROCEDURE — 6370000000 HC RX 637 (ALT 250 FOR IP): Performed by: HOSPITALIST

## 2025-01-21 PROCEDURE — 93005 ELECTROCARDIOGRAM TRACING: CPT | Performed by: STUDENT IN AN ORGANIZED HEALTH CARE EDUCATION/TRAINING PROGRAM

## 2025-01-21 PROCEDURE — 85025 COMPLETE CBC W/AUTO DIFF WBC: CPT

## 2025-01-21 PROCEDURE — 6370000000 HC RX 637 (ALT 250 FOR IP): Performed by: STUDENT IN AN ORGANIZED HEALTH CARE EDUCATION/TRAINING PROGRAM

## 2025-01-21 PROCEDURE — 2580000003 HC RX 258: Performed by: STUDENT IN AN ORGANIZED HEALTH CARE EDUCATION/TRAINING PROGRAM

## 2025-01-21 PROCEDURE — 6360000002 HC RX W HCPCS: Performed by: STUDENT IN AN ORGANIZED HEALTH CARE EDUCATION/TRAINING PROGRAM

## 2025-01-21 PROCEDURE — 93010 ELECTROCARDIOGRAM REPORT: CPT | Performed by: INTERNAL MEDICINE

## 2025-01-21 PROCEDURE — 2500000003 HC RX 250 WO HCPCS: Performed by: HOSPITALIST

## 2025-01-21 PROCEDURE — 36415 COLL VENOUS BLD VENIPUNCTURE: CPT

## 2025-01-21 PROCEDURE — 80048 BASIC METABOLIC PNL TOTAL CA: CPT

## 2025-01-21 RX ADMIN — FAMOTIDINE 20 MG: 20 TABLET, FILM COATED ORAL at 21:00

## 2025-01-21 RX ADMIN — SODIUM CHLORIDE, PRESERVATIVE FREE 10 ML: 5 INJECTION INTRAVENOUS at 08:51

## 2025-01-21 RX ADMIN — SODIUM CHLORIDE: 9 INJECTION, SOLUTION INTRAVENOUS at 21:03

## 2025-01-21 RX ADMIN — SODIUM CHLORIDE 3000 MG: 900 INJECTION INTRAVENOUS at 05:14

## 2025-01-21 RX ADMIN — ACETAMINOPHEN 650 MG: 325 TABLET ORAL at 12:14

## 2025-01-21 RX ADMIN — SODIUM CHLORIDE 3000 MG: 900 INJECTION INTRAVENOUS at 16:32

## 2025-01-21 RX ADMIN — FERROUS SULFATE TAB 325 MG (65 MG ELEMENTAL FE) 325 MG: 325 (65 FE) TAB at 08:50

## 2025-01-21 RX ADMIN — SODIUM CHLORIDE 3000 MG: 900 INJECTION INTRAVENOUS at 22:42

## 2025-01-21 RX ADMIN — Medication 1 CAPSULE: at 08:50

## 2025-01-21 RX ADMIN — SODIUM CHLORIDE 3000 MG: 900 INJECTION INTRAVENOUS at 10:43

## 2025-01-21 RX ADMIN — FAMOTIDINE 20 MG: 20 TABLET, FILM COATED ORAL at 08:50

## 2025-01-21 RX ADMIN — SODIUM CHLORIDE: 9 INJECTION, SOLUTION INTRAVENOUS at 12:19

## 2025-01-21 ASSESSMENT — PAIN DESCRIPTION - LOCATION: LOCATION: BREAST

## 2025-01-21 ASSESSMENT — PAIN SCALES - GENERAL
PAINLEVEL_OUTOF10: 0
PAINLEVEL_OUTOF10: 3

## 2025-01-21 ASSESSMENT — PAIN DESCRIPTION - DESCRIPTORS: DESCRIPTORS: ACHING

## 2025-01-21 ASSESSMENT — PAIN DESCRIPTION - ORIENTATION: ORIENTATION: RIGHT

## 2025-01-21 NOTE — PROGRESS NOTES
Hospitalist Progress Note   Admit Date:  2025 10:06 AM   Name:  July Kerr   Age:  64 y.o.  Sex:  female  :  1960   MRN:  [id number]   Room:  /    Presenting/Chief Complaint: Loss of Consciousness     Reason(s) for Admission: Septicemia (HCC) [A41.9]  Near syncope [R55]  Acute cystitis without hematuria [N30.00]  Sepsis (HCC) [A41.9]     Hospital Course:   July Kerr is a 64 y.o. 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  Patient states that she has been having intermittent weakness in lower extremities over the past few weeks and on day of arrival she stood up from a seated position and had near syncope.  Of note patient was scheduled to have bilateral mastectomy by Dr. Cabral on day of admission and was n.p.o.  She has had 2 previous hospital admissions with discharges on 2024 and 2025 both due to nausea/vomiting and GI evaluated during hospitalization.    On arrival to the ED notable labs include lactic acid 2.0, Pro-Gopi 2.03, albumin 1.9, WBCs 14.5, UA with 3+ bacteria and leukocyte Estrace.  CT chest was negative for PE but showed large breast mass which has been known.  Patient status post Zosyn and Rocephin for UTI.  CT head showed no acute infarct, hemorrhage, or mass but showed fluid throughout left mastoid air cells.  PT/OT consulted, recommend home health.      Subjective & 24hr Events:   Patient evaluated at bedside.  Patient resting comfortably in bed with family member at bedside.  Patient did well with therapy services yesterday however states today she was having much more difficulty and palpitations when working with therapy.  Patient remained afebrile overnight.  Incentive spirometer at bedside.       Assessment & Plan:     Sepsis:  -Sepsis criteria met based on tachycardia, leukocytosis  -Etiology includes: UTI, otitis media/less

## 2025-01-21 NOTE — PLAN OF CARE
Problem: Discharge Planning  Goal: Discharge to home or other facility with appropriate resources  1/20/2025 2352 by Virginia Wyatt RN  Outcome: Progressing  1/20/2025 1103 by Michelle Macias RN  Outcome: Progressing     Problem: Skin/Tissue Integrity  Goal: Absence of new skin breakdown  Description: 1.  Monitor for areas of redness and/or skin breakdown  2.  Assess vascular access sites hourly  3.  Every 4-6 hours minimum:  Change oxygen saturation probe site  4.  Every 4-6 hours:  If on nasal continuous positive airway pressure, respiratory therapy assess nares and determine need for appliance change or resting period.  1/20/2025 2352 by Virginia Wyatt RN  Outcome: Progressing  1/20/2025 1103 by Michelle Macias RN  Outcome: Progressing     Problem: Safety - Adult  Goal: Free from fall injury  1/20/2025 2352 by Virginia Wyatt RN  Outcome: Progressing  1/20/2025 1103 by Michelle Macias RN  Outcome: Progressing     Problem: Pain  Goal: Verbalizes/displays adequate comfort level or baseline comfort level  1/20/2025 2352 by Virginia Wyatt RN  Outcome: Progressing  1/20/2025 1103 by Michelle Macias RN  Outcome: Progressing

## 2025-01-21 NOTE — PROGRESS NOTES
ACUTE PHYSICAL THERAPY GOALS:   (Developed with and agreed upon by patient and/or caregiver.)  (1.) July Kerr  will move from supine to sit and sit to supine  and scoot up and down with INDEPENDENCE within 7 treatment day(s).    (2.) July Kerr will transfer from bed to chair and chair to bed with SUPERVISION using the least restrictive device within 7 treatment day(s).    (3.) July Kerr will ambulate with SUPERVISION for 50 feet with the least restrictive device within 7 treatment day(s).   (4.) July Kerr will perform standing static and dynamic balance activities x 10 minutes with SUPERVISION to improve safety within 7 treatment day(s).  (5.) July Kerr will perform therapeutic exercises x 15 min for HEP with INDEPENDENCE to improve strength, endurance, and functional mobility within 7 treatment day(s).     PHYSICAL THERAPY: Daily Note AM   (Link to Caseload Tracking: PT Visit Days : 3  Time In/Out PT Charge Capture  Rehab Caseload Tracker  Orders    July Kerr is a 64 y.o. female   PRIMARY DIAGNOSIS: Sepsis (HCC)  Septicemia (HCC) [A41.9]  Near syncope [R55]  Acute cystitis without hematuria [N30.00]  Sepsis (HCC) [A41.9]  Procedure(s) (LRB):  BREAST MASTECTOMY BILATERAL WITH RIGHT SENTINEL NODE EXCISION PreOp:          10:00 am  SN:                11:30 am  OR:                1:30 pm (Bilateral)  RIGHT SENTINEL NODE EXCISION (Right)     Inpatient: Payor: AMBETTER / Plan: AMBETTER / Product Type: *No Product type* /     ASSESSMENT:     REHAB RECOMMENDATIONS:   Recommendation to date pending progress:  Setting:  Home Health Therapy    Equipment:    None     ASSESSMENT:  Ms. Kerr is supine in bed and agreeable to therapy .  Mother at bedside.  Patient states that her pain is 8/10 in the right breast.  Patient states that she had BP issues earlier.   BP are as follow:  Supine 116/58  Sitting 119/66  Standing 122/65  Bed mobility is with min assist to  get to the edge of the bed.

## 2025-01-21 NOTE — PROGRESS NOTES
Pt was working with PT/OT in the hallway when tele called and alerted that pt HR was 159 and came down to 145 sustaining. Sat pt down in chair and rolled her back to her room. Notified MD. Orders for STAT EKG were placed. /63, O2 93% RA. Pt family sttes that pt seems \"off\" and slower then usual.     1216: Pt reported to not be eating/drinking as much as usual because she vomits it all back up. Has been going on for the past month per pt. Pt has an order for NS at 100/hr that was stopped on Monday around 0500. Orders to restart the NS at 100/hr.       1219: Pt back in bed resting, HR back down to 122.     1641: Pt HR is SR at 98 currently. Pt is comfortable in bed.

## 2025-01-21 NOTE — PLAN OF CARE
Problem: Discharge Planning  Goal: Discharge to home or other facility with appropriate resources  1/21/2025 1135 by Reid Lopez  Outcome: Progressing  1/20/2025 2352 by Virginia Wyatt, RN  Outcome: Progressing     Problem: Skin/Tissue Integrity  Goal: Absence of new skin breakdown  Description: 1.  Monitor for areas of redness and/or skin breakdown  2.  Assess vascular access sites hourly  3.  Every 4-6 hours minimum:  Change oxygen saturation probe site  4.  Every 4-6 hours:  If on nasal continuous positive airway pressure, respiratory therapy assess nares and determine need for appliance change or resting period.  1/21/2025 1135 by Ried Lopez  Outcome: Progressing  1/20/2025 2352 by Virginia Wyatt, RN  Outcome: Progressing     Problem: Safety - Adult  Goal: Free from fall injury  1/21/2025 1135 by Reid Lopez  Outcome: Progressing  1/20/2025 2352 by Virginia Wyatt, RN  Outcome: Progressing     Problem: Pain  Goal: Verbalizes/displays adequate comfort level or baseline comfort level  1/21/2025 1135 by Reid Lopez  Outcome: Progressing  1/20/2025 2352 by Virginia Wyatt, RN  Outcome: Progressing

## 2025-01-22 LAB
ANION GAP SERPL CALC-SCNC: 12 MMOL/L (ref 7–16)
BASOPHILS # BLD: 0.02 K/UL (ref 0–0.2)
BASOPHILS NFR BLD: 0.2 % (ref 0–2)
BUN SERPL-MCNC: 4 MG/DL (ref 8–23)
CALCIUM SERPL-MCNC: 8.1 MG/DL (ref 8.8–10.2)
CHLORIDE SERPL-SCNC: 97 MMOL/L (ref 98–107)
CO2 SERPL-SCNC: 24 MMOL/L (ref 20–29)
CREAT SERPL-MCNC: 0.26 MG/DL (ref 0.6–1.1)
DIFFERENTIAL METHOD BLD: ABNORMAL
EOSINOPHIL # BLD: 0 K/UL (ref 0–0.8)
EOSINOPHIL NFR BLD: 0 % (ref 0.5–7.8)
ERYTHROCYTE [DISTWIDTH] IN BLOOD BY AUTOMATED COUNT: 16.5 % (ref 11.9–14.6)
GLUCOSE SERPL-MCNC: 84 MG/DL (ref 70–99)
HCT VFR BLD AUTO: 21.6 % (ref 35.8–46.3)
HCT VFR BLD AUTO: 29.6 % (ref 35.8–46.3)
HGB BLD-MCNC: 6.5 G/DL (ref 11.7–15.4)
HGB BLD-MCNC: 9.3 G/DL (ref 11.7–15.4)
HISTORY CHECK: NORMAL
IMM GRANULOCYTES # BLD AUTO: 0.12 K/UL (ref 0–0.5)
IMM GRANULOCYTES NFR BLD AUTO: 1.2 % (ref 0–5)
LYMPHOCYTES # BLD: 1.21 K/UL (ref 0.5–4.6)
LYMPHOCYTES NFR BLD: 12.4 % (ref 13–44)
MAGNESIUM SERPL-MCNC: 1.7 MG/DL (ref 1.8–2.4)
MCH RBC QN AUTO: 27.1 PG (ref 26.1–32.9)
MCHC RBC AUTO-ENTMCNC: 30.1 G/DL (ref 31.4–35)
MCV RBC AUTO: 90 FL (ref 82–102)
MONOCYTES # BLD: 0.62 K/UL (ref 0.1–1.3)
MONOCYTES NFR BLD: 6.4 % (ref 4–12)
NEUTS SEG # BLD: 7.77 K/UL (ref 1.7–8.2)
NEUTS SEG NFR BLD: 79.8 % (ref 43–78)
NRBC # BLD: 0 K/UL (ref 0–0.2)
PLATELET # BLD AUTO: 442 K/UL (ref 150–450)
PMV BLD AUTO: 8.8 FL (ref 9.4–12.3)
POTASSIUM SERPL-SCNC: 3.3 MMOL/L (ref 3.5–5.1)
RBC # BLD AUTO: 2.4 M/UL (ref 4.05–5.2)
SODIUM SERPL-SCNC: 133 MMOL/L (ref 136–145)
WBC # BLD AUTO: 9.7 K/UL (ref 4.3–11.1)

## 2025-01-22 PROCEDURE — 85014 HEMATOCRIT: CPT

## 2025-01-22 PROCEDURE — P9016 RBC LEUKOCYTES REDUCED: HCPCS

## 2025-01-22 PROCEDURE — 80048 BASIC METABOLIC PNL TOTAL CA: CPT

## 2025-01-22 PROCEDURE — 86901 BLOOD TYPING SEROLOGIC RH(D): CPT

## 2025-01-22 PROCEDURE — 6370000000 HC RX 637 (ALT 250 FOR IP): Performed by: STUDENT IN AN ORGANIZED HEALTH CARE EDUCATION/TRAINING PROGRAM

## 2025-01-22 PROCEDURE — 2580000003 HC RX 258: Performed by: STUDENT IN AN ORGANIZED HEALTH CARE EDUCATION/TRAINING PROGRAM

## 2025-01-22 PROCEDURE — 6370000000 HC RX 637 (ALT 250 FOR IP): Performed by: FAMILY MEDICINE

## 2025-01-22 PROCEDURE — 86850 RBC ANTIBODY SCREEN: CPT

## 2025-01-22 PROCEDURE — 85018 HEMOGLOBIN: CPT

## 2025-01-22 PROCEDURE — 6360000002 HC RX W HCPCS: Performed by: STUDENT IN AN ORGANIZED HEALTH CARE EDUCATION/TRAINING PROGRAM

## 2025-01-22 PROCEDURE — 36415 COLL VENOUS BLD VENIPUNCTURE: CPT

## 2025-01-22 PROCEDURE — 86923 COMPATIBILITY TEST ELECTRIC: CPT

## 2025-01-22 PROCEDURE — 6370000000 HC RX 637 (ALT 250 FOR IP): Performed by: HOSPITALIST

## 2025-01-22 PROCEDURE — 83735 ASSAY OF MAGNESIUM: CPT

## 2025-01-22 PROCEDURE — 30233N1 TRANSFUSION OF NONAUTOLOGOUS RED BLOOD CELLS INTO PERIPHERAL VEIN, PERCUTANEOUS APPROACH: ICD-10-PCS | Performed by: FAMILY MEDICINE

## 2025-01-22 PROCEDURE — 1100000003 HC PRIVATE W/ TELEMETRY

## 2025-01-22 PROCEDURE — 85025 COMPLETE CBC W/AUTO DIFF WBC: CPT

## 2025-01-22 PROCEDURE — 76937 US GUIDE VASCULAR ACCESS: CPT

## 2025-01-22 PROCEDURE — 2500000003 HC RX 250 WO HCPCS: Performed by: HOSPITALIST

## 2025-01-22 PROCEDURE — 36430 TRANSFUSION BLD/BLD COMPNT: CPT

## 2025-01-22 PROCEDURE — 86900 BLOOD TYPING SEROLOGIC ABO: CPT

## 2025-01-22 PROCEDURE — 6360000002 HC RX W HCPCS: Performed by: FAMILY MEDICINE

## 2025-01-22 RX ORDER — SODIUM CHLORIDE 9 MG/ML
INJECTION, SOLUTION INTRAVENOUS PRN
Status: DISCONTINUED | OUTPATIENT
Start: 2025-01-22 | End: 2025-01-25 | Stop reason: HOSPADM

## 2025-01-22 RX ORDER — POTASSIUM CHLORIDE 1500 MG/1
40 TABLET, EXTENDED RELEASE ORAL EVERY 4 HOURS
Status: DISPENSED | OUTPATIENT
Start: 2025-01-22 | End: 2025-01-22

## 2025-01-22 RX ORDER — MAGNESIUM SULFATE IN WATER 40 MG/ML
2000 INJECTION, SOLUTION INTRAVENOUS ONCE
Status: COMPLETED | OUTPATIENT
Start: 2025-01-22 | End: 2025-01-22

## 2025-01-22 RX ADMIN — SODIUM CHLORIDE 3000 MG: 900 INJECTION INTRAVENOUS at 10:37

## 2025-01-22 RX ADMIN — FAMOTIDINE 20 MG: 20 TABLET, FILM COATED ORAL at 07:55

## 2025-01-22 RX ADMIN — SODIUM CHLORIDE, PRESERVATIVE FREE 10 ML: 5 INJECTION INTRAVENOUS at 21:23

## 2025-01-22 RX ADMIN — Medication 1 CAPSULE: at 07:55

## 2025-01-22 RX ADMIN — SODIUM CHLORIDE 3000 MG: 900 INJECTION INTRAVENOUS at 05:41

## 2025-01-22 RX ADMIN — FAMOTIDINE 20 MG: 20 TABLET, FILM COATED ORAL at 21:23

## 2025-01-22 RX ADMIN — SODIUM CHLORIDE 3000 MG: 900 INJECTION INTRAVENOUS at 17:05

## 2025-01-22 RX ADMIN — MAGNESIUM SULFATE HEPTAHYDRATE 2000 MG: 40 INJECTION, SOLUTION INTRAVENOUS at 08:08

## 2025-01-22 RX ADMIN — SODIUM CHLORIDE 3000 MG: 900 INJECTION INTRAVENOUS at 23:21

## 2025-01-22 RX ADMIN — FERROUS SULFATE TAB 325 MG (65 MG ELEMENTAL FE) 325 MG: 325 (65 FE) TAB at 07:55

## 2025-01-22 RX ADMIN — POTASSIUM CHLORIDE 40 MEQ: 1500 TABLET, EXTENDED RELEASE ORAL at 17:02

## 2025-01-22 RX ADMIN — POTASSIUM CHLORIDE 40 MEQ: 1500 TABLET, EXTENDED RELEASE ORAL at 07:54

## 2025-01-22 RX ADMIN — SODIUM CHLORIDE: 9 INJECTION, SOLUTION INTRAVENOUS at 21:25

## 2025-01-22 ASSESSMENT — PAIN SCALES - GENERAL
PAINLEVEL_OUTOF10: 0
PAINLEVEL_OUTOF10: 0

## 2025-01-22 NOTE — ACP (ADVANCE CARE PLANNING)
Advance Care Planning   Healthcare Decision Maker:    Primary Decision Maker: JOSE LUIS GALLEGOS - Parent - [phone]    Secondary Decision Maker: Lelo Tidwell - Niece/Nephew - [phone]    Click here to complete Healthcare Decision Makers including selection of the Healthcare Decision Maker Relationship (ie \"Primary\").

## 2025-01-22 NOTE — PLAN OF CARE
Problem: Discharge Planning  Goal: Discharge to home or other facility with appropriate resources  1/22/2025 0058 by Marleni Martinez RN  Outcome: Progressing  1/21/2025 1135 by Reid Lopez  Outcome: Progressing     Problem: Skin/Tissue Integrity  Goal: Absence of new skin breakdown  Description: 1.  Monitor for areas of redness and/or skin breakdown  2.  Assess vascular access sites hourly  3.  Every 4-6 hours minimum:  Change oxygen saturation probe site  4.  Every 4-6 hours:  If on nasal continuous positive airway pressure, respiratory therapy assess nares and determine need for appliance change or resting period.  1/22/2025 0058 by aMrleni Martinez RN  Outcome: Progressing  1/21/2025 1135 by Reid Lopez  Outcome: Progressing     Problem: Safety - Adult  Goal: Free from fall injury  1/22/2025 0058 by Marleni Martinez RN  Outcome: Progressing  1/21/2025 1135 by Reid Lopez  Outcome: Progressing     Problem: Pain  Goal: Verbalizes/displays adequate comfort level or baseline comfort level  1/22/2025 0058 by Marleni Martinez RN  Outcome: Progressing  1/21/2025 1135 by Reid Lopez  Outcome: Progressing

## 2025-01-22 NOTE — PROGRESS NOTES
END OF SHIFT NOTE:    INTAKE/OUTPUT  01/21 0701 - 01/22 0700  In: 1240 [P.O.:1240]  Out: 1250 [Urine:1250]  Voiding: Yes  Catheter: No  Drain:   External Urinary Catheter (Active)   Site Assessment Clean,dry & intact 01/21/25 2056   Placement Replaced 01/21/25 1816   Securement Method Securing device (Describe) 01/21/25 2056   Catheter Care Catheter/Wick replaced;Suction Canister/Tubing changed 01/21/25 1816   Perineal Care Yes 01/21/25 1816   Suction 40 mmgHg continuous 01/21/25 2056   Urine Color Yellow 01/21/25 2056   Urine Appearance Clear 01/21/25 2056   Output (mL) 300 mL 01/22/25 0200               Flatus: Patient does have flatus present.    Stool: 0 occurrences.    Characteristics:  Stool Appearance: Soft  Stool Color: Tan  Stool Amount: Medium  Stool Assessment  Incontinence: No  Stool Appearance: Soft  Stool Color: Tan  Stool Amount: Medium  Stool Source: Rectum  Last BM (including prior to admit): 01/21/25 (per pt)    Emesis: 0 occurrences.    Characteristics:        VITAL SIGNS  Patient Vitals for the past 12 hrs:   Temp Pulse Resp BP SpO2   01/22/25 0556 -- (!) 107 -- (!) 115/58 93 %   01/22/25 0322 98.2 °F (36.8 °C) (!) 106 16 (!) 110/55 94 %   01/21/25 2306 98.6 °F (37 °C) (!) 104 19 110/61 96 %   01/21/25 1926 97.7 °F (36.5 °C) (!) 107 16 109/61 94 %       Pain Assessment  Pain Level: 0 (01/21/25 2056)  Pain Location: Breast  Patient's Stated Pain Goal: 0 - No pain    Ambulating  No    Shift report given to oncoming nurse at the bedside.    Marleni Martinez RN

## 2025-01-22 NOTE — PROGRESS NOTES
Critical hemoglobin of 6.5 received this AM from lab. Pt assessed and asymptomatic. On call provider notified and asked to continue to monitor. No new orders placed at this time.

## 2025-01-22 NOTE — CARE COORDINATION
Chart reviewed by RNCM and discussed in IDR.    PT/OT recommends home health at discharge.    Patient provided home health choice list for review.    Awaiting selection.    CM following.

## 2025-01-22 NOTE — PROGRESS NOTES
Hospitalist Progress Note   Admit Date:  2025 10:06 AM   Name:  July Kerr   Age:  64 y.o.  Sex:  female  :  1960   MRN:  [id number]   Room:  /    Presenting/Chief Complaint: Loss of Consciousness     Reason(s) for Admission: Septicemia (HCC) [A41.9]  Near syncope [R55]  Acute cystitis without hematuria [N30.00]  Sepsis (HCC) [A41.9]     Hospital Course:   July Kerr is a 64 y.o. 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  Patient states that she has been having intermittent weakness in lower extremities over the past few weeks and on day of arrival she stood up from a seated position and had near syncope.  Of note patient was scheduled to have bilateral mastectomy by Dr. Cabral on day of admission and was n.p.o.  She has had 2 previous hospital admissions with discharges on 2024 and 2025 both due to nausea/vomiting and GI evaluated during hospitalization.     On arrival to the ED notable labs include lactic acid 2.0, Pro-Gopi 2.03, albumin 1.9, WBCs 14.5, UA with 3+ bacteria and leukocyte Estrace.  CT chest was negative for PE but showed large breast mass which has been known.  Patient status post Zosyn and Rocephin for UTI.  CT head showed no acute infarct, hemorrhage, or mass but showed fluid throughout left mastoid air cells.  PT/OT consulted, recommend home health.    Subjective & 24hr Events:   Pt rt breast pain on movement  K 3.3, mag 1.7  Hb 6.5      Assessment & Plan:     Sepsis:  -Sepsis criteria met based on tachycardia, leukocytosis  -Etiology includes: UTI, otitis media/less likely mastoiditis, atelectasis  -UA: 3+ bacteria, leukocyte esterase  -Urine cultures: Greater than 100,000 colonies of normal skin renee  -Head CT : Fluid throughout left mastoid air cells suggesting mastoiditis  -Blood cultures no growth to date  -Chest x-ray:  capsule  1 capsule Oral Daily with breakfast       Signed:  JIMMIE BUCIO MD

## 2025-01-22 NOTE — CONSENT
Informed Consent for Blood Component Transfusion Note    I have discussed with the patient the rationale for blood component transfusion; its benefits in treating or preventing fatigue, organ damage, or death; and its risk which includes mild transfusion reactions, rare risk of blood borne infection, or more serious but rare reactions. I have discussed the alternatives to transfusion, including the risk and consequences of not receiving transfusion. The patient had an opportunity to ask questions and had agreed to proceed with transfusion of blood components.    Electronically signed by JIMMIE BUCIO MD on 1/22/25 at 8:52 AM EST

## 2025-01-22 NOTE — PROGRESS NOTES
Occupational Therapy Note:    Attempted to see patient this AM for occupational therapy treatment  session. Hold pt for low HGB of 6.5 per nurse. Needs two units of blood. Will follow and re-attempt as schedule permits/patient available.     Thank you,    ALICIA REID, OT    Rehab Caseload Tracker

## 2025-01-22 NOTE — NURSE NAVIGATOR
This RN Navigator introduced to patient in patient room.  Patient informed that this RN Navigator will be following them at least 30 days post discharge to act as a resource for any needs that may arise in relation to their sepsis diagnosis and treatment.      Patient states understanding of sepsis disease process.  Care action plan discussed.  Patient states understanding. Discussed     Patient verbalized understanding of importance of knowing s/s of sepsis and when to contact physician.    Sepsis educational sheet given to patient along with this RN Navigator contact information.  Patient informed that this RN Navigator will be contacting them within 48-72 hours of discharge. Contact information verified by patient.  RN Navigator will continue to follow.      Patient is currently receiving PRBC and discharge is TBD. Patient states she is to go home, where she lives with mother, with  services. Patient has established PCP.

## 2025-01-22 NOTE — PLAN OF CARE
Problem: Discharge Planning  Goal: Discharge to home or other facility with appropriate resources  1/22/2025 1118 by Marti Garcia, RN  Outcome: Progressing  1/22/2025 0058 by Marleni Martinez RN  Outcome: Progressing     Problem: Skin/Tissue Integrity  Goal: Absence of new skin breakdown  Description: 1.  Monitor for areas of redness and/or skin breakdown  2.  Assess vascular access sites hourly  3.  Every 4-6 hours minimum:  Change oxygen saturation probe site  4.  Every 4-6 hours:  If on nasal continuous positive airway pressure, respiratory therapy assess nares and determine need for appliance change or resting period.  1/22/2025 1118 by Marti Garcia, RN  Outcome: Progressing  1/22/2025 0058 by Marleni Martinez RN  Outcome: Progressing     Problem: Safety - Adult  Goal: Free from fall injury  1/22/2025 1118 by Marti Garcia, RN  Outcome: Progressing  1/22/2025 0058 by Marleni Martinez RN  Outcome: Progressing     Problem: Pain  Goal: Verbalizes/displays adequate comfort level or baseline comfort level  1/22/2025 1118 by Marti Garcia, RN  Outcome: Progressing  1/22/2025 0058 by Marleni Martinez RN  Outcome: Progressing

## 2025-01-22 NOTE — PROGRESS NOTES
IP Consult to Vascular Access Team  Consult performed by: Rony Brumfield RN  Consult ordered by: Rod Cabral MD  Reason for consult: PIV  Assessment/Recommendations: Ultrasound was used to find the vein which was compressible and without any ultrasound features of an artery or nerve bundle. Skin was cleaned and disinfected prior to IV puncture.  Under real-time ultrasound guidance peripheral access was obtained in the left forearm using 20 G 1.75\" Peripheral IV catheter after 1 attempt(s). No immediate complications noted. Patient tolerated the procedure well.  Refer to IV flowsheet for further documentation.

## 2025-01-22 NOTE — PROGRESS NOTES
Physical Therapy Note:    Attempted to see patient this AM for physical therapy treatment  session.  Treatment held per RN as the patients Hgb is 6.5. Will follow and re-attempt as schedule permits/patient available. Thank you,    Fabienne Holloway-Julia, [facility]     Rehab Caseload Tracker

## 2025-01-23 LAB
ABO + RH BLD: NORMAL
ANION GAP SERPL CALC-SCNC: 9 MMOL/L (ref 7–16)
BASOPHILS # BLD: 0.03 K/UL (ref 0–0.2)
BASOPHILS NFR BLD: 0.3 % (ref 0–2)
BLD PROD TYP BPU: NORMAL
BLD PROD TYP BPU: NORMAL
BLOOD BANK BLOOD PRODUCT EXPIRATION DATE: NORMAL
BLOOD BANK BLOOD PRODUCT EXPIRATION DATE: NORMAL
BLOOD BANK DISPENSE STATUS: NORMAL
BLOOD BANK DISPENSE STATUS: NORMAL
BLOOD BANK ISBT PRODUCT BLOOD TYPE: 6200
BLOOD BANK ISBT PRODUCT BLOOD TYPE: 6200
BLOOD BANK PRODUCT CODE: NORMAL
BLOOD BANK PRODUCT CODE: NORMAL
BLOOD BANK UNIT TYPE AND RH: NORMAL
BLOOD BANK UNIT TYPE AND RH: NORMAL
BLOOD GROUP ANTIBODIES SERPL: NORMAL
BPU ID: NORMAL
BPU ID: NORMAL
BUN SERPL-MCNC: 3 MG/DL (ref 8–23)
CALCIUM SERPL-MCNC: 8.2 MG/DL (ref 8.8–10.2)
CHLORIDE SERPL-SCNC: 100 MMOL/L (ref 98–107)
CO2 SERPL-SCNC: 25 MMOL/L (ref 20–29)
CREAT SERPL-MCNC: 0.32 MG/DL (ref 0.6–1.1)
CROSSMATCH RESULT: NORMAL
CROSSMATCH RESULT: NORMAL
DIFFERENTIAL METHOD BLD: ABNORMAL
EOSINOPHIL # BLD: 0.02 K/UL (ref 0–0.8)
EOSINOPHIL NFR BLD: 0.2 % (ref 0.5–7.8)
ERYTHROCYTE [DISTWIDTH] IN BLOOD BY AUTOMATED COUNT: 15.9 % (ref 11.9–14.6)
GLUCOSE SERPL-MCNC: 119 MG/DL (ref 70–99)
HCT VFR BLD AUTO: 28.4 % (ref 35.8–46.3)
HEMOCCULT STL QL: NEGATIVE
HGB BLD-MCNC: 8.8 G/DL (ref 11.7–15.4)
IMM GRANULOCYTES # BLD AUTO: 0.15 K/UL (ref 0–0.5)
IMM GRANULOCYTES NFR BLD AUTO: 1.4 % (ref 0–5)
LYMPHOCYTES # BLD: 1.48 K/UL (ref 0.5–4.6)
LYMPHOCYTES NFR BLD: 14 % (ref 13–44)
MAGNESIUM SERPL-MCNC: 1.9 MG/DL (ref 1.8–2.4)
MCH RBC QN AUTO: 27.8 PG (ref 26.1–32.9)
MCHC RBC AUTO-ENTMCNC: 31 G/DL (ref 31.4–35)
MCV RBC AUTO: 89.6 FL (ref 82–102)
MONOCYTES # BLD: 0.77 K/UL (ref 0.1–1.3)
MONOCYTES NFR BLD: 7.3 % (ref 4–12)
NEUTS SEG # BLD: 8.14 K/UL (ref 1.7–8.2)
NEUTS SEG NFR BLD: 76.8 % (ref 43–78)
NRBC # BLD: 0 K/UL (ref 0–0.2)
PLATELET # BLD AUTO: 515 K/UL (ref 150–450)
PMV BLD AUTO: 9 FL (ref 9.4–12.3)
POTASSIUM SERPL-SCNC: 3.9 MMOL/L (ref 3.5–5.1)
RBC # BLD AUTO: 3.17 M/UL (ref 4.05–5.2)
SODIUM SERPL-SCNC: 134 MMOL/L (ref 136–145)
SPECIMEN EXP DATE BLD: NORMAL
UNIT DIVISION: 0
UNIT DIVISION: 0
UNIT ISSUE DATE/TIME: NORMAL
UNIT ISSUE DATE/TIME: NORMAL
WBC # BLD AUTO: 10.6 K/UL (ref 4.3–11.1)

## 2025-01-23 PROCEDURE — 6370000000 HC RX 637 (ALT 250 FOR IP): Performed by: HOSPITALIST

## 2025-01-23 PROCEDURE — 2580000003 HC RX 258: Performed by: STUDENT IN AN ORGANIZED HEALTH CARE EDUCATION/TRAINING PROGRAM

## 2025-01-23 PROCEDURE — 6370000000 HC RX 637 (ALT 250 FOR IP): Performed by: STUDENT IN AN ORGANIZED HEALTH CARE EDUCATION/TRAINING PROGRAM

## 2025-01-23 PROCEDURE — 1100000003 HC PRIVATE W/ TELEMETRY

## 2025-01-23 PROCEDURE — 83735 ASSAY OF MAGNESIUM: CPT

## 2025-01-23 PROCEDURE — 97112 NEUROMUSCULAR REEDUCATION: CPT

## 2025-01-23 PROCEDURE — 85025 COMPLETE CBC W/AUTO DIFF WBC: CPT

## 2025-01-23 PROCEDURE — 6360000002 HC RX W HCPCS: Performed by: STUDENT IN AN ORGANIZED HEALTH CARE EDUCATION/TRAINING PROGRAM

## 2025-01-23 PROCEDURE — 97116 GAIT TRAINING THERAPY: CPT

## 2025-01-23 PROCEDURE — 97535 SELF CARE MNGMENT TRAINING: CPT

## 2025-01-23 PROCEDURE — 97530 THERAPEUTIC ACTIVITIES: CPT

## 2025-01-23 PROCEDURE — 80048 BASIC METABOLIC PNL TOTAL CA: CPT

## 2025-01-23 PROCEDURE — 82272 OCCULT BLD FECES 1-3 TESTS: CPT

## 2025-01-23 PROCEDURE — 36415 COLL VENOUS BLD VENIPUNCTURE: CPT

## 2025-01-23 RX ADMIN — SODIUM CHLORIDE 3000 MG: 900 INJECTION INTRAVENOUS at 16:39

## 2025-01-23 RX ADMIN — FERROUS SULFATE TAB 325 MG (65 MG ELEMENTAL FE) 325 MG: 325 (65 FE) TAB at 07:56

## 2025-01-23 RX ADMIN — FAMOTIDINE 20 MG: 20 TABLET, FILM COATED ORAL at 21:21

## 2025-01-23 RX ADMIN — SODIUM CHLORIDE 3000 MG: 900 INJECTION INTRAVENOUS at 11:29

## 2025-01-23 RX ADMIN — Medication 1 CAPSULE: at 07:56

## 2025-01-23 RX ADMIN — SODIUM CHLORIDE 3000 MG: 900 INJECTION INTRAVENOUS at 05:19

## 2025-01-23 RX ADMIN — SODIUM CHLORIDE: 9 INJECTION, SOLUTION INTRAVENOUS at 07:59

## 2025-01-23 RX ADMIN — FAMOTIDINE 20 MG: 20 TABLET, FILM COATED ORAL at 07:56

## 2025-01-23 ASSESSMENT — PAIN SCALES - GENERAL: PAINLEVEL_OUTOF10: 0

## 2025-01-23 NOTE — PROGRESS NOTES
END OF SHIFT NOTE:    INTAKE/OUTPUT  01/22 0701 - 01/23 0700  In: 1259 [P.O.:460]  Out: 2600 [Urine:2600]  Voiding: Yes  Catheter: No  Drain:   External Urinary Catheter (Active)   Site Assessment Clean,dry & intact 01/23/25 0617   Placement Replaced 01/23/25 0617   Securement Method Securing device (Describe) 01/22/25 2126   Catheter Care Catheter/Wick replaced 01/23/25 0617   Perineal Care Yes 01/23/25 0617   Suction 40 mmgHg continuous 01/23/25 0617   Urine Color Yellow 01/23/25 0617   Urine Appearance Clear 01/23/25 0617   Output (mL) 200 mL 01/23/25 0617               Flatus: Patient does have flatus present.    Stool: 0 occurrences.    Characteristics:  Stool Appearance: Soft  Stool Color: Tan  Stool Amount: Medium  Stool Assessment  Incontinence: No  Stool Appearance: Soft  Stool Color: Tan  Stool Amount: Medium  Stool Source: Rectum  Last BM (including prior to admit): 01/22/25    Emesis: 0 occurrences.    Characteristics:        VITAL SIGNS  Patient Vitals for the past 12 hrs:   Temp Pulse Resp BP SpO2   01/23/25 0306 98.8 °F (37.1 °C) (!) 103 18 105/65 95 %   01/22/25 2337 97.5 °F (36.4 °C) (!) 107 18 119/70 95 %       Pain Assessment  Pain Level: 0 (01/22/25 2025)  Pain Location: Breast  Patient's Stated Pain Goal: 0 - No pain    Ambulating  No    Shift report given to oncoming nurse at the bedside.    Marleni Martinez RN

## 2025-01-23 NOTE — PLAN OF CARE
Problem: Discharge Planning  Goal: Discharge to home or other facility with appropriate resources  1/23/2025 1107 by Marti Garcia, RN  Outcome: Progressing  1/23/2025 0306 by Marleni Martinez RN  Outcome: Progressing     Problem: Skin/Tissue Integrity  Goal: Absence of new skin breakdown  Description: 1.  Monitor for areas of redness and/or skin breakdown  2.  Assess vascular access sites hourly  3.  Every 4-6 hours minimum:  Change oxygen saturation probe site  4.  Every 4-6 hours:  If on nasal continuous positive airway pressure, respiratory therapy assess nares and determine need for appliance change or resting period.  1/23/2025 1107 by Marti Garcia, RN  Outcome: Progressing  1/23/2025 0306 by Marleni Martinez RN  Outcome: Progressing     Problem: Safety - Adult  Goal: Free from fall injury  1/23/2025 1107 by Marti Garcia, RN  Outcome: Progressing  1/23/2025 0306 by Marleni Mratinez RN  Outcome: Progressing     Problem: Pain  Goal: Verbalizes/displays adequate comfort level or baseline comfort level  1/23/2025 1107 by Marti Garcia, RN  Outcome: Progressing  1/23/2025 0306 by Marleni Martinez RN  Outcome: Progressing

## 2025-01-23 NOTE — PROGRESS NOTES
ACUTE PHYSICAL THERAPY GOALS:   (Developed with and agreed upon by patient and/or caregiver.)  (1.) July Kerr  will move from supine to sit and sit to supine  and scoot up and down with INDEPENDENCE within 7 treatment day(s).    (2.) July Kerr will transfer from bed to chair and chair to bed with SUPERVISION using the least restrictive device within 7 treatment day(s).    (3.) July Kerr will ambulate with SUPERVISION for 50 feet with the least restrictive device within 7 treatment day(s).   (4.) July Kerr will perform standing static and dynamic balance activities x 10 minutes with SUPERVISION to improve safety within 7 treatment day(s).  (5.) July Kerr will perform therapeutic exercises x 15 min for HEP with INDEPENDENCE to improve strength, endurance, and functional mobility within 7 treatment day(s).     PHYSICAL THERAPY: Daily Note PM   (Link to Caseload Tracking: PT Visit Days : 3  Time In/Out PT Charge Capture  Rehab Caseload Tracker  Orders    July Kerr is a 64 y.o. female   PRIMARY DIAGNOSIS: Sepsis (HCC)  Septicemia (HCC) [A41.9]  Near syncope [R55]  Acute cystitis without hematuria [N30.00]  Sepsis (HCC) [A41.9]  Procedure(s) (LRB):  BREAST MASTECTOMY BILATERAL WITH RIGHT SENTINEL NODE EXCISION PreOp:          10:00 am  SN:                11:30 am  OR:                1:30 pm (Bilateral)  RIGHT SENTINEL NODE EXCISION (Right)     Inpatient: Payor: AMBETTER / Plan: AMBETTER / Product Type: *No Product type* /     ASSESSMENT:     REHAB RECOMMENDATIONS:   Recommendation to date pending progress:  Setting:  Home Health Therapy    Equipment:    None     ASSESSMENT:  Ms. Kerr is supine in bed and agreeable to therapy.  OT present and mother at bedside.  Hgb is 8.8 today and the patient looks better, voice is stronger and looks like she is feeling a bit better.  Bed mobility is with min assist to get to the edge of the bed.  Sitting balance good.  Sit to stand with min assist

## 2025-01-23 NOTE — PROGRESS NOTES
Hospitalist Progress Note   Admit Date:  2025 10:06 AM   Name:  July Kerr   Age:  64 y.o.  Sex:  female  :  1960   MRN:  [id number]   Room:  /    Presenting/Chief Complaint: Loss of Consciousness     Reason(s) for Admission: Septicemia (HCC) [A41.9]  Near syncope [R55]  Acute cystitis without hematuria [N30.00]  Sepsis (HCC) [A41.9]     Hospital Course:   July Kerr is a 64 y.o. 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  Patient states that she has been having intermittent weakness in lower extremities over the past few weeks and on day of arrival she stood up from a seated position and had near syncope.  Of note patient was scheduled to have bilateral mastectomy by Dr. Cabral on day of admission and was n.p.o.  She has had 2 previous hospital admissions with discharges on 2024 and 2025 both due to nausea/vomiting and GI evaluated during hospitalization.     On arrival to the ED notable labs include lactic acid 2.0, Pro-Gopi 2.03, albumin 1.9, WBCs 14.5, UA with 3+ bacteria and leukocyte Estrace.  CT chest was negative for PE but showed large breast mass which has been known.  Patient status post Zosyn and Rocephin for UTI.  CT head showed no acute infarct, hemorrhage, or mass but showed fluid throughout left mastoid air cells.  PT/OT consulted, recommend home health.    Subjective & 24hr Events:     Pt rt breast pain on movement  K 3.3, mag 1.7  Hb 6.5      Patient hemoglobin of 8.8, received 2 units of PRBC yesterday, normal potassium 3.9, normal magnesium 1.9, says doing okay  Later on today Hemoccult was negative      Assessment & Plan:     Sepsis:  -Sepsis criteria met based on tachycardia, leukocytosis  -Etiology includes: UTI, otitis media/less likely mastoiditis, atelectasis  -UA: 3+ bacteria, leukocyte esterase  -Urine cultures:

## 2025-01-23 NOTE — PROGRESS NOTES
ACUTE OCCUPATIONAL THERAPY GOALS:   (Developed with and agreed upon by patient and/or caregiver.)  1. Patient will complete lower body bathing and dressing with MOD I and adaptive equipment as needed.   2.Patient will complete upper body bathing and dressing with MOD I and adaptive equipment as needed.  3. Patient will complete toileting with SUPERVISION.   4. Patient will tolerate 30 minutes of OT treatment with 1-2 rest breaks to increase activity tolerance for ADLs.   5. Patient will complete functional transfers with SUPERVISION and adaptive equipment as needed.   6. Patient will complete functional activity with SUPERVISION and adaptive equipment as needed.  7. Patient will complete simple grooming task in standing with SUPERVISION.     Timeframe: 7 visits           OCCUPATIONAL THERAPY: Daily Note PM   OT Visit Days: 3   Time In/Out  OT Charge Capture  Rehab Caseload Tracker  OT Orders    July Kerr is a 64 y.o. female   PRIMARY DIAGNOSIS: Sepsis (HCC)  Septicemia (HCC) [A41.9]  Near syncope [R55]  Acute cystitis without hematuria [N30.00]  Sepsis (HCC) [A41.9]  Procedure(s) (LRB):  BREAST MASTECTOMY BILATERAL WITH RIGHT SENTINEL NODE EXCISION PreOp:          10:00 am  SN:                11:30 am  OR:                1:30 pm (Bilateral)  RIGHT SENTINEL NODE EXCISION (Right)     Inpatient: Payor: AMBETTER / Plan: AMBETTER / Product Type: *No Product type* /     ASSESSMENT:     REHAB RECOMMENDATIONS:   Recommendation to date pending progress:  Setting:  Home Health Therapy    Equipment:    To Be Determined     ASSESSMENT:  Ms. Kerr was received supine in bed with mother present. Pt required assistance for functional mobility and ADLs today due to back pain from enlarged R breast compared to L, fatigue with exertion. Pt required multiple standing rest breaks in hallway today. Pt did sit to complete ADLs today. Non-symptomatic tachycardia today, resting -120s and 140s with activity. Pt is

## 2025-01-24 ENCOUNTER — APPOINTMENT (OUTPATIENT)
Dept: NON INVASIVE DIAGNOSTICS | Age: 65
End: 2025-01-24
Attending: FAMILY MEDICINE
Payer: COMMERCIAL

## 2025-01-24 ENCOUNTER — APPOINTMENT (OUTPATIENT)
Dept: ULTRASOUND IMAGING | Age: 65
End: 2025-01-24
Payer: COMMERCIAL

## 2025-01-24 PROBLEM — E44.0 MODERATE PROTEIN-CALORIE MALNUTRITION (HCC): Status: ACTIVE | Noted: 2025-01-24

## 2025-01-24 PROBLEM — R79.89 ELEVATED D-DIMER: Status: ACTIVE | Noted: 2025-01-24

## 2025-01-24 LAB
ANION GAP SERPL CALC-SCNC: 10 MMOL/L (ref 7–16)
BASOPHILS # BLD: 0.04 K/UL (ref 0–0.2)
BASOPHILS NFR BLD: 0.4 % (ref 0–2)
BUN SERPL-MCNC: 2 MG/DL (ref 8–23)
CALCIUM SERPL-MCNC: 8.2 MG/DL (ref 8.8–10.2)
CHLORIDE SERPL-SCNC: 98 MMOL/L (ref 98–107)
CO2 SERPL-SCNC: 25 MMOL/L (ref 20–29)
CREAT SERPL-MCNC: 0.3 MG/DL (ref 0.6–1.1)
DIFFERENTIAL METHOD BLD: ABNORMAL
ECHO AO ASC DIAM: 3.5 CM
ECHO AO ASCENDING AORTA INDEX: 1.84 CM/M2
ECHO AO ROOT DIAM: 3.3 CM
ECHO AO ROOT INDEX: 1.74 CM/M2
ECHO AV AREA PEAK VELOCITY: 2.6 CM2
ECHO AV AREA VTI: 2.9 CM2
ECHO AV AREA/BSA PEAK VELOCITY: 1.4 CM2/M2
ECHO AV AREA/BSA VTI: 1.5 CM2/M2
ECHO AV MEAN GRADIENT: 6 MMHG
ECHO AV MEAN VELOCITY: 1.1 M/S
ECHO AV PEAK GRADIENT: 13 MMHG
ECHO AV PEAK VELOCITY: 1.8 M/S
ECHO AV VELOCITY RATIO: 0.67
ECHO AV VTI: 29.1 CM
ECHO BSA: 1.95 M2
ECHO LA AREA 2C: 20.5 CM2
ECHO LA AREA 4C: 20.8 CM2
ECHO LA MAJOR AXIS: 6.2 CM
ECHO LA MINOR AXIS: 6.4 CM
ECHO LA VOL BP: 55 ML (ref 22–52)
ECHO LA VOL MOD A2C: 54 ML (ref 22–52)
ECHO LA VOL MOD A4C: 56 ML (ref 22–52)
ECHO LA VOL/BSA BIPLANE: 29 ML/M2 (ref 16–34)
ECHO LA VOLUME INDEX MOD A2C: 28 ML/M2 (ref 16–34)
ECHO LA VOLUME INDEX MOD A4C: 29 ML/M2 (ref 16–34)
ECHO LV E' LATERAL VELOCITY: 10.7 CM/S
ECHO LV E' SEPTAL VELOCITY: 7.74 CM/S
ECHO LV EDV A2C: 123 ML
ECHO LV EDV A4C: 116 ML
ECHO LV EDV INDEX A4C: 61 ML/M2
ECHO LV EDV NDEX A2C: 65 ML/M2
ECHO LV EJECTION FRACTION A2C: 56 %
ECHO LV EJECTION FRACTION A4C: 57 %
ECHO LV EJECTION FRACTION BIPLANE: 56 % (ref 55–100)
ECHO LV ESV A2C: 54 ML
ECHO LV ESV A4C: 50 ML
ECHO LV ESV INDEX A2C: 28 ML/M2
ECHO LV ESV INDEX A4C: 26 ML/M2
ECHO LV FRACTIONAL SHORTENING: 29 % (ref 28–44)
ECHO LV INTERNAL DIMENSION DIASTOLE INDEX: 2.68 CM/M2
ECHO LV INTERNAL DIMENSION DIASTOLIC: 5.1 CM (ref 3.9–5.3)
ECHO LV INTERNAL DIMENSION SYSTOLIC INDEX: 1.89 CM/M2
ECHO LV INTERNAL DIMENSION SYSTOLIC: 3.6 CM
ECHO LV IVSD: 0.8 CM (ref 0.6–0.9)
ECHO LV MASS 2D: 140.5 G (ref 67–162)
ECHO LV MASS INDEX 2D: 73.9 G/M2 (ref 43–95)
ECHO LV POSTERIOR WALL DIASTOLIC: 0.8 CM (ref 0.6–0.9)
ECHO LV RELATIVE WALL THICKNESS RATIO: 0.31
ECHO LVOT AREA: 3.8 CM2
ECHO LVOT AV VTI INDEX: 0.76
ECHO LVOT DIAM: 2.2 CM
ECHO LVOT MEAN GRADIENT: 3 MMHG
ECHO LVOT PEAK GRADIENT: 6 MMHG
ECHO LVOT PEAK VELOCITY: 1.2 M/S
ECHO LVOT STROKE VOLUME INDEX: 44.2 ML/M2
ECHO LVOT SV: 84 ML
ECHO LVOT VTI: 22.1 CM
ECHO PV ACCELERATION TIME (AT): 102 MS
ECHO PV MAX VELOCITY: 1.2 M/S
ECHO PV PEAK GRADIENT: 6 MMHG
ECHO RA AREA 4C: 18.1 CM2
ECHO RA END SYSTOLIC VOLUME APICAL 4 CHAMBER INDEX BSA: 27 ML/M2
ECHO RA VOLUME: 51 ML
ECHO RV FREE WALL PEAK S': 19.2 CM/S
ECHO RV TAPSE: 2.6 CM (ref 1.7–?)
EKG ATRIAL RATE: 110 BPM
EKG DIAGNOSIS: NORMAL
EKG P AXIS: 46 DEGREES
EKG P-R INTERVAL: 134 MS
EKG Q-T INTERVAL: 346 MS
EKG QRS DURATION: 70 MS
EKG QTC CALCULATION (BAZETT): 468 MS
EKG R AXIS: -12 DEGREES
EKG T AXIS: 66 DEGREES
EKG VENTRICULAR RATE: 110 BPM
EOSINOPHIL # BLD: 0.03 K/UL (ref 0–0.8)
EOSINOPHIL NFR BLD: 0.3 % (ref 0.5–7.8)
ERYTHROCYTE [DISTWIDTH] IN BLOOD BY AUTOMATED COUNT: 15.9 % (ref 11.9–14.6)
GLUCOSE SERPL-MCNC: 112 MG/DL (ref 70–99)
HCT VFR BLD AUTO: 28.9 % (ref 35.8–46.3)
HGB BLD-MCNC: 9 G/DL (ref 11.7–15.4)
IMM GRANULOCYTES # BLD AUTO: 0.12 K/UL (ref 0–0.5)
IMM GRANULOCYTES NFR BLD AUTO: 1.3 % (ref 0–5)
LYMPHOCYTES # BLD: 1.27 K/UL (ref 0.5–4.6)
LYMPHOCYTES NFR BLD: 13.3 % (ref 13–44)
MAGNESIUM SERPL-MCNC: 1.9 MG/DL (ref 1.8–2.4)
MCH RBC QN AUTO: 28.1 PG (ref 26.1–32.9)
MCHC RBC AUTO-ENTMCNC: 31.1 G/DL (ref 31.4–35)
MCV RBC AUTO: 90.3 FL (ref 82–102)
MONOCYTES # BLD: 0.7 K/UL (ref 0.1–1.3)
MONOCYTES NFR BLD: 7.3 % (ref 4–12)
NEUTS SEG # BLD: 7.41 K/UL (ref 1.7–8.2)
NEUTS SEG NFR BLD: 77.4 % (ref 43–78)
NRBC # BLD: 0.02 K/UL (ref 0–0.2)
PLATELET # BLD AUTO: 462 K/UL (ref 150–450)
PMV BLD AUTO: 8.8 FL (ref 9.4–12.3)
POTASSIUM SERPL-SCNC: 3.6 MMOL/L (ref 3.5–5.1)
RBC # BLD AUTO: 3.2 M/UL (ref 4.05–5.2)
SODIUM SERPL-SCNC: 133 MMOL/L (ref 136–145)
WBC # BLD AUTO: 9.6 K/UL (ref 4.3–11.1)

## 2025-01-24 PROCEDURE — 2580000003 HC RX 258: Performed by: STUDENT IN AN ORGANIZED HEALTH CARE EDUCATION/TRAINING PROGRAM

## 2025-01-24 PROCEDURE — 93970 EXTREMITY STUDY: CPT | Performed by: RADIOLOGY

## 2025-01-24 PROCEDURE — 36415 COLL VENOUS BLD VENIPUNCTURE: CPT

## 2025-01-24 PROCEDURE — 80048 BASIC METABOLIC PNL TOTAL CA: CPT

## 2025-01-24 PROCEDURE — 1100000003 HC PRIVATE W/ TELEMETRY

## 2025-01-24 PROCEDURE — 85025 COMPLETE CBC W/AUTO DIFF WBC: CPT

## 2025-01-24 PROCEDURE — 6370000000 HC RX 637 (ALT 250 FOR IP): Performed by: STUDENT IN AN ORGANIZED HEALTH CARE EDUCATION/TRAINING PROGRAM

## 2025-01-24 PROCEDURE — 93970 EXTREMITY STUDY: CPT

## 2025-01-24 PROCEDURE — 2580000003 HC RX 258: Performed by: HOSPITALIST

## 2025-01-24 PROCEDURE — 93306 TTE W/DOPPLER COMPLETE: CPT | Performed by: INTERNAL MEDICINE

## 2025-01-24 PROCEDURE — 93005 ELECTROCARDIOGRAM TRACING: CPT | Performed by: FAMILY MEDICINE

## 2025-01-24 PROCEDURE — C8929 TTE W OR WO FOL WCON,DOPPLER: HCPCS

## 2025-01-24 PROCEDURE — 6370000000 HC RX 637 (ALT 250 FOR IP): Performed by: FAMILY MEDICINE

## 2025-01-24 PROCEDURE — 83735 ASSAY OF MAGNESIUM: CPT

## 2025-01-24 PROCEDURE — 93010 ELECTROCARDIOGRAM REPORT: CPT | Performed by: INTERNAL MEDICINE

## 2025-01-24 PROCEDURE — 97530 THERAPEUTIC ACTIVITIES: CPT

## 2025-01-24 PROCEDURE — 6360000004 HC RX CONTRAST MEDICATION: Performed by: FAMILY MEDICINE

## 2025-01-24 PROCEDURE — 2500000003 HC RX 250 WO HCPCS: Performed by: HOSPITALIST

## 2025-01-24 PROCEDURE — 6360000002 HC RX W HCPCS: Performed by: STUDENT IN AN ORGANIZED HEALTH CARE EDUCATION/TRAINING PROGRAM

## 2025-01-24 PROCEDURE — 6370000000 HC RX 637 (ALT 250 FOR IP): Performed by: HOSPITALIST

## 2025-01-24 RX ORDER — METOPROLOL TARTRATE 1 MG/ML
2.5 INJECTION, SOLUTION INTRAVENOUS ONCE
Status: DISCONTINUED | OUTPATIENT
Start: 2025-01-24 | End: 2025-01-24

## 2025-01-24 RX ORDER — POTASSIUM CHLORIDE 1500 MG/1
40 TABLET, EXTENDED RELEASE ORAL ONCE
Status: COMPLETED | OUTPATIENT
Start: 2025-01-24 | End: 2025-01-24

## 2025-01-24 RX ORDER — METOPROLOL TARTRATE 25 MG/1
25 TABLET, FILM COATED ORAL 2 TIMES DAILY
Status: DISCONTINUED | OUTPATIENT
Start: 2025-01-24 | End: 2025-01-25 | Stop reason: HOSPADM

## 2025-01-24 RX ADMIN — SODIUM CHLORIDE: 9 INJECTION, SOLUTION INTRAVENOUS at 17:44

## 2025-01-24 RX ADMIN — SODIUM CHLORIDE 3000 MG: 900 INJECTION INTRAVENOUS at 05:26

## 2025-01-24 RX ADMIN — SODIUM CHLORIDE 3000 MG: 900 INJECTION INTRAVENOUS at 11:31

## 2025-01-24 RX ADMIN — METOPROLOL TARTRATE 25 MG: 25 TABLET, FILM COATED ORAL at 22:23

## 2025-01-24 RX ADMIN — FERROUS SULFATE TAB 325 MG (65 MG ELEMENTAL FE) 325 MG: 325 (65 FE) TAB at 08:26

## 2025-01-24 RX ADMIN — FAMOTIDINE 20 MG: 20 TABLET, FILM COATED ORAL at 22:18

## 2025-01-24 RX ADMIN — Medication 1 CAPSULE: at 08:26

## 2025-01-24 RX ADMIN — SODIUM CHLORIDE 3000 MG: 900 INJECTION INTRAVENOUS at 00:08

## 2025-01-24 RX ADMIN — SODIUM CHLORIDE: 9 INJECTION, SOLUTION INTRAVENOUS at 11:31

## 2025-01-24 RX ADMIN — SODIUM CHLORIDE 3000 MG: 900 INJECTION INTRAVENOUS at 22:27

## 2025-01-24 RX ADMIN — SODIUM CHLORIDE 3000 MG: 900 INJECTION INTRAVENOUS at 17:44

## 2025-01-24 RX ADMIN — SULFUR HEXAFLUORIDE 2 ML: KIT at 14:09

## 2025-01-24 RX ADMIN — FAMOTIDINE 20 MG: 20 TABLET, FILM COATED ORAL at 08:26

## 2025-01-24 RX ADMIN — POTASSIUM CHLORIDE 40 MEQ: 1500 TABLET, EXTENDED RELEASE ORAL at 09:36

## 2025-01-24 RX ADMIN — SODIUM CHLORIDE, PRESERVATIVE FREE 10 ML: 5 INJECTION INTRAVENOUS at 08:26

## 2025-01-24 NOTE — PROGRESS NOTES
Nutrition Assessment  Assessment Type: Initial  Reason for visit:  Length of Stay  Malnutrition Screening Tool Score: 1    Nutrition Intervention:   Food and/or Nutrient Delivery:   Meals and Snacks:  Diet: Continue current order  Medical Food Supplements:   Medical food supplement therapy:  Change Ensure Plus High Protein (high calorie high protein) 350 calories, 20 grams protein per 8 ounce serving        Malnutrition Assessment:  [facility]S.P[facility] Clinical Malnutrition Criteria  Malnutrition Status: Moderate malnutrition  Context: Acute Illness  Findings of clinical characteristics of malnutrition:   Energy Intake:  75% or less of estimated energy requirements for 7 or more days  Weight Loss:  Greater than 7.5% over 3 months     Body Fat Loss:  Unable to assess     Muscle Mass Loss:  Unable to assess    Fluid Accumulation:  Unable to assess     Strength:  Not Performed  Nutrition Focused Physical Exam:  quick visual unremarkable    Nutrition Assessment:  Food/Nutrition Related History: Patient being take for procedure; per 1/3 RD note:\"Patient and family report that she has been unable to eat anything solid and has only retained liquid for 20 minutes or less before having vomiting since her prior hospitalization despite taking the Reglan and Zofran as prescribed. Patient and family reports that she is very weak to the point that she cannot get up out of bed or walk on her own at this point, and that she has lost \"a lot of weight\".   During recent admission patient was on PPN and liquid diet     Do You Have Any Cultural, Sabianist, or Ethnic Food Preferences?: No   Weight History: 10/2/2019: 225# (Ortho), 10/14/24: 211# (PC), 1/2/24: 194# (Gen Surg)  8% body weight loss in ~3 months. This is significant per ASPEN/AND standards.   CBW with no source specified- patient out of room.     Nutrition Background:       PMH: tumor of R breast, intractable N/V since breast biopsy in late October of 2024, obseity, HTN.

## 2025-01-24 NOTE — PROGRESS NOTES
ACUTE PHYSICAL THERAPY GOALS:   (Developed with and agreed upon by patient and/or caregiver.)  (1.) July Kerr  will move from supine to sit and sit to supine  and scoot up and down with INDEPENDENCE within 7 treatment day(s).    (2.) July Kerr will transfer from bed to chair and chair to bed with SUPERVISION using the least restrictive device within 7 treatment day(s).    (3.) July Kerr will ambulate with SUPERVISION for 50 feet with the least restrictive device within 7 treatment day(s).   (4.) July Kerr will perform standing static and dynamic balance activities x 10 minutes with SUPERVISION to improve safety within 7 treatment day(s).  (5.) July Kerr will perform therapeutic exercises x 15 min for HEP with INDEPENDENCE to improve strength, endurance, and functional mobility within 7 treatment day(s).     PHYSICAL THERAPY: Daily Note PM   (Link to Caseload Tracking: PT Visit Days : 5  Time In/Out PT Charge Capture  Rehab Caseload Tracker  Orders    July Kerr is a 64 y.o. female   PRIMARY DIAGNOSIS: Sepsis (HCC)  Septicemia (HCC) [A41.9]  Near syncope [R55]  Acute cystitis without hematuria [N30.00]  Sepsis (HCC) [A41.9]  Procedure(s) (LRB):  BREAST MASTECTOMY BILATERAL WITH RIGHT SENTINEL NODE EXCISION PreOp:          10:00 am  SN:                11:30 am  OR:                1:30 pm (Bilateral)  RIGHT SENTINEL NODE EXCISION (Right)     Inpatient: Payor: AMBETTER / Plan: AMBETTER / Product Type: *No Product type* /     ASSESSMENT:     REHAB RECOMMENDATIONS:   Recommendation to date pending progress:  Setting:  Home Health Therapy    Equipment:    None     ASSESSMENT:  Ms. Kerr is supine in bed and agreeable to therapy.  OT present and mother at bedside.  Hgb is 8.8 today and the patient looks better, voice is stronger and looks like she is feeling a bit better.  Bed mobility is with min assist to get to the edge of the bed.  Sitting balance good.  Sit to stand with min assist

## 2025-01-24 NOTE — PROGRESS NOTES
END OF SHIFT NOTE:    INTAKE/OUTPUT  01/23 0701 - 01/24 0700  In: 820 [P.O.:820]  Out: 2850 [Urine:2850]  Voiding: Yes  Catheter: No  Drain:   External Urinary Catheter (Active)   Site Assessment Clean,dry & intact 01/24/25 0130   Placement Replaced 01/24/25 0130   Securement Method Securing device (Describe) 01/24/25 0130   Catheter Care Catheter/Wick replaced;Suction Canister/Tubing changed 01/24/25 0130   Perineal Care Yes 01/24/25 0130   Suction 40 mmgHg continuous 01/24/25 0130   Urine Color Yellow 01/24/25 0130   Urine Appearance Clear 01/24/25 0130   Output (mL) 300 mL 01/24/25 0339               Flatus: Patient does have flatus present.    Stool: 0 occurrences.    Characteristics:  Stool Appearance: Watery  Stool Color: Tan  Stool Amount: Large  Stool Assessment  Incontinence: No  Stool Appearance: Watery  Stool Color: Tan  Stool Amount: Large  Stool Source: Rectum  Last BM (including prior to admit): 01/23/25    Emesis: 0 occurrences.    Characteristics:        VITAL SIGNS  Patient Vitals for the past 12 hrs:   Temp Pulse Resp BP SpO2   01/24/25 0339 99.3 °F (37.4 °C) (!) 107 18 125/75 94 %   01/23/25 2250 98.2 °F (36.8 °C) (!) 102 18 126/70 94 %   01/23/25 1959 98.6 °F (37 °C) (!) 105 18 127/72 95 %       Pain Assessment  Pain Level: 0 (01/23/25 0750)  Pain Location: Breast  Patient's Stated Pain Goal: 0 - No pain    Ambulating  Yes    Shift report given to oncoming nurse at the bedside.    Marleni Martinez RN

## 2025-01-24 NOTE — PROGRESS NOTES
Hospitalist Progress Note   Admit Date:  2025 10:06 AM   Name:  July Kerr   Age:  64 y.o.  Sex:  female  :  1960   MRN:  [id number]   Room:      Presenting/Chief Complaint: Loss of Consciousness     Reason(s) for Admission: Septicemia (HCC) [A41.9]  Near syncope [R55]  Acute cystitis without hematuria [N30.00]  Sepsis (HCC) [A41.9]     Hospital Course:   July Kerr is a 64 y.o. 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  Patient states that she has been having intermittent weakness in lower extremities over the past few weeks and on day of arrival she stood up from a seated position and had near syncope.  Of note patient was scheduled to have bilateral mastectomy by Dr. Cabral on day of admission and was n.p.o.  She has had 2 previous hospital admissions with discharges on 2024 and 2025 both due to nausea/vomiting and GI evaluated during hospitalization.     On arrival to the ED notable labs include lactic acid 2.0, Pro-Gopi 2.03, albumin 1.9, WBCs 14.5, UA with 3+ bacteria and leukocyte Estrace.  CT chest was negative for PE but showed large breast mass which has been known.  Patient status post Zosyn and Rocephin for UTI.  CT head showed no acute infarct, hemorrhage, or mass but showed fluid throughout left mastoid air cells.  PT/OT consulted, recommend home health.    Subjective & 24hr Events:     Pt rt breast pain on movement  K 3.3, mag 1.7  Hb 6.5      Patient hemoglobin of 8.8, received 2 units of PRBC yesterday, normal potassium 3.9, normal magnesium 1.9, says doing okay  Later on today Hemoccult was negative      Patient heart rate 130s to 140s, sinus tachycardia when she is walking  Patient otherwise does not complain of any headache or dizziness or shortness of breath  Admission CT chest PE protocol negative for PE  Ordered  Collection Time: 01/24/25  5:40 AM   Result Value Ref Range    Sodium 133 (L) 136 - 145 mmol/L    Potassium 3.6 3.5 - 5.1 mmol/L    Chloride 98 98 - 107 mmol/L    CO2 25 20 - 29 mmol/L    Anion Gap 10 7 - 16 mmol/L    Glucose 112 (H) 70 - 99 mg/dL    BUN 2 (L) 8 - 23 MG/DL    Creatinine 0.30 (L) 0.60 - 1.10 MG/DL    Est, Glom Filt Rate >90 >60 ml/min/1.73m2    Calcium 8.2 (L) 8.8 - 10.2 MG/DL   Magnesium    Collection Time: 01/24/25  5:40 AM   Result Value Ref Range    Magnesium 1.9 1.8 - 2.4 mg/dL   EKG 12 Lead    Collection Time: 01/24/25  9:21 AM   Result Value Ref Range    Ventricular Rate 110 BPM    Atrial Rate 110 BPM    P-R Interval 134 ms    QRS Duration 70 ms    Q-T Interval 346 ms    QTc Calculation (Bazett) 468 ms    P Axis 46 degrees    R Axis -12 degrees    T Axis 66 degrees    Diagnosis       Sinus tachycardia  Minimal voltage criteria for LVH, may be normal variant ( R in aVL )  Anterolateral infarct , age undetermined  Abnormal ECG  When compared with ECG of 21-JAN-2025 12:08,  Anterolateral infarct is now Present         No results for input(s): \"COVID19\" in the last 72 hours.    Current Meds:  Current Facility-Administered Medications   Medication Dose Route Frequency    metoprolol (LOPRESSOR) injection 2.5 mg  2.5 mg IntraVENous Once    0.9 % sodium chloride infusion   IntraVENous PRN    ampicillin-sulbactam (UNASYN) 3,000 mg in sodium chloride 0.9 % 100 mL IVPB (mini-bag)  3,000 mg IntraVENous Q6H    ferrous sulfate (IRON 325) tablet 325 mg  325 mg Oral Daily with breakfast    famotidine (PEPCID) tablet 20 mg  20 mg Oral BID    potassium chloride (KLOR-CON M) extended release tablet 40 mEq  40 mEq Oral PRN    Or    potassium bicarb-citric acid (EFFER-K) effervescent tablet 40 mEq  40 mEq Oral PRN    Or    potassium chloride 10 mEq/100 mL IVPB (Peripheral Line)  10 mEq IntraVENous PRN    sodium chloride flush 0.9 % injection 5-40 mL  5-40 mL IntraVENous 2 times per day    sodium chloride flush 0.9

## 2025-01-24 NOTE — CARE COORDINATION
Chart reviewed by RNCM.    Patient has no preference for home health agency. Referral made to [facility]. Patient declined by [facility] because patient's insurance is not in network. Referral made to [facility]. Patient accepted. CM following.

## 2025-01-24 NOTE — PLAN OF CARE
Problem: Discharge Planning  Goal: Discharge to home or other facility with appropriate resources  1/24/2025 1227 by Holland Casas RN  Outcome: Progressing  1/24/2025 0450 by Marleni Martinez RN  Outcome: Progressing     Problem: Skin/Tissue Integrity  Goal: Absence of new skin breakdown  Description: 1.  Monitor for areas of redness and/or skin breakdown  2.  Assess vascular access sites hourly  3.  Every 4-6 hours minimum:  Change oxygen saturation probe site  4.  Every 4-6 hours:  If on nasal continuous positive airway pressure, respiratory therapy assess nares and determine need for appliance change or resting period.  1/24/2025 1227 by Holland Casas RN  Outcome: Progressing  1/24/2025 0450 by Marleni Martinez RN  Outcome: Progressing     Problem: Safety - Adult  Goal: Free from fall injury  1/24/2025 1227 by Holland Casas RN  Outcome: Progressing  1/24/2025 0450 by Marleni Martinez RN  Outcome: Progressing     Problem: Pain  Goal: Verbalizes/displays adequate comfort level or baseline comfort level  1/24/2025 1227 by Holland Casas RN  Outcome: Progressing  1/24/2025 0450 by Marleni Martinez RN  Outcome: Progressing

## 2025-01-24 NOTE — NURSE NAVIGATOR
2nd nurse navigator visit with patient. Patient states she is feeling better today and expects to be discharged this weekend. Patient asking about how to obtain pull-ups, wipes and nutritional drinks for home use. This navigator contacted [facility] to inquire. Spoke to Clair, who reviewed patients policy and informed that patient does not have OTC benefits on her policy. Patient will have to pay for supplies out of pocket. Informed patient of outcome.

## 2025-01-24 NOTE — PROGRESS NOTES
Spiritual Health History and Assessment/Progress Note  [facility]    Follow-up,  ,  ,      Name: July Kerr MRN: [id number]    Age: 64 y.o.     Sex: female   Language: English   Jew: None   Sepsis (HCC)     Date: 1/24/2025            Total Time Calculated: 20 min              Spiritual Assessment continued in [facility] 2 SURGICAL        Referral/Consult From: Rounding   Encounter Overview/Reason: Follow-up  Service Provided For: Patient and family together    [place], Belief, Meaning:   Patient identifies as spiritual and is connected with a [place]alia tradition or spiritual practice  Family/Friends identify as spiritual and are connected with a [place] tradition or spiritual practice      Importance and Influence:  Patient has spiritual/personal beliefs that influence decisions regarding their health  Family/Friends have spiritual/personal beliefs that influence decisions regarding the patient's health    Community:  Patient is connected with a spiritual community and feels well-supported. Support system includes: Parent/s, Children, and Extended family  Family/Friends Other: unknown    Assessment and Plan of Care:     Patient Interventions include: Facilitated expression of thoughts and feelings, Explored spiritual coping/struggle/distress, and Other: prayed with pt  Family/Friends Interventions include: Other: family joined us in prayer    Patient Plan of Care: Spiritual Care available upon further referral  Family/Friends Plan of Care: Spiritual Care available upon further referral    Electronically signed by Chaplain SALIMA on 1/24/2025 at 5:32 PM

## 2025-01-25 VITALS
DIASTOLIC BLOOD PRESSURE: 65 MMHG | TEMPERATURE: 98.1 F | WEIGHT: 182 LBS | HEART RATE: 96 BPM | HEIGHT: 65 IN | OXYGEN SATURATION: 94 % | RESPIRATION RATE: 14 BRPM | SYSTOLIC BLOOD PRESSURE: 117 MMHG | BODY MASS INDEX: 30.32 KG/M2

## 2025-01-25 LAB
ANION GAP SERPL CALC-SCNC: 11 MMOL/L (ref 7–16)
BACTERIA SPEC CULT: NORMAL
BACTERIA SPEC CULT: NORMAL
BASOPHILS # BLD: 0.05 K/UL (ref 0–0.2)
BASOPHILS NFR BLD: 0.5 % (ref 0–2)
BUN SERPL-MCNC: 3 MG/DL (ref 8–23)
CALCIUM SERPL-MCNC: 8.2 MG/DL (ref 8.8–10.2)
CHLORIDE SERPL-SCNC: 97 MMOL/L (ref 98–107)
CO2 SERPL-SCNC: 25 MMOL/L (ref 20–29)
CREAT SERPL-MCNC: 0.3 MG/DL (ref 0.6–1.1)
DIFFERENTIAL METHOD BLD: ABNORMAL
EOSINOPHIL # BLD: 0.02 K/UL (ref 0–0.8)
EOSINOPHIL NFR BLD: 0.2 % (ref 0.5–7.8)
ERYTHROCYTE [DISTWIDTH] IN BLOOD BY AUTOMATED COUNT: 16 % (ref 11.9–14.6)
GLUCOSE SERPL-MCNC: 132 MG/DL (ref 70–99)
HCT VFR BLD AUTO: 28.5 % (ref 35.8–46.3)
HGB BLD-MCNC: 8.8 G/DL (ref 11.7–15.4)
IMM GRANULOCYTES # BLD AUTO: 0.12 K/UL (ref 0–0.5)
IMM GRANULOCYTES NFR BLD AUTO: 1.3 % (ref 0–5)
LYMPHOCYTES # BLD: 1.36 K/UL (ref 0.5–4.6)
LYMPHOCYTES NFR BLD: 14.2 % (ref 13–44)
MAGNESIUM SERPL-MCNC: 1.8 MG/DL (ref 1.8–2.4)
MCH RBC QN AUTO: 27.9 PG (ref 26.1–32.9)
MCHC RBC AUTO-ENTMCNC: 30.9 G/DL (ref 31.4–35)
MCV RBC AUTO: 90.5 FL (ref 82–102)
MONOCYTES # BLD: 0.74 K/UL (ref 0.1–1.3)
MONOCYTES NFR BLD: 7.7 % (ref 4–12)
NEUTS SEG # BLD: 7.31 K/UL (ref 1.7–8.2)
NEUTS SEG NFR BLD: 76.1 % (ref 43–78)
NRBC # BLD: 0 K/UL (ref 0–0.2)
PLATELET # BLD AUTO: 500 K/UL (ref 150–450)
PMV BLD AUTO: 8.9 FL (ref 9.4–12.3)
POTASSIUM SERPL-SCNC: 3.9 MMOL/L (ref 3.5–5.1)
RBC # BLD AUTO: 3.15 M/UL (ref 4.05–5.2)
SERVICE CMNT-IMP: NORMAL
SERVICE CMNT-IMP: NORMAL
SODIUM SERPL-SCNC: 132 MMOL/L (ref 136–145)
TSH W FREE THYROID IF ABNORMAL: 1.8 UIU/ML (ref 0.27–4.2)
WBC # BLD AUTO: 9.6 K/UL (ref 4.3–11.1)

## 2025-01-25 PROCEDURE — 6360000002 HC RX W HCPCS: Performed by: STUDENT IN AN ORGANIZED HEALTH CARE EDUCATION/TRAINING PROGRAM

## 2025-01-25 PROCEDURE — 36415 COLL VENOUS BLD VENIPUNCTURE: CPT

## 2025-01-25 PROCEDURE — 6370000000 HC RX 637 (ALT 250 FOR IP): Performed by: HOSPITALIST

## 2025-01-25 PROCEDURE — 83735 ASSAY OF MAGNESIUM: CPT

## 2025-01-25 PROCEDURE — 6360000002 HC RX W HCPCS: Performed by: FAMILY MEDICINE

## 2025-01-25 PROCEDURE — 85025 COMPLETE CBC W/AUTO DIFF WBC: CPT

## 2025-01-25 PROCEDURE — 6370000000 HC RX 637 (ALT 250 FOR IP): Performed by: STUDENT IN AN ORGANIZED HEALTH CARE EDUCATION/TRAINING PROGRAM

## 2025-01-25 PROCEDURE — 2580000003 HC RX 258: Performed by: STUDENT IN AN ORGANIZED HEALTH CARE EDUCATION/TRAINING PROGRAM

## 2025-01-25 PROCEDURE — 6370000000 HC RX 637 (ALT 250 FOR IP): Performed by: FAMILY MEDICINE

## 2025-01-25 PROCEDURE — 80048 BASIC METABOLIC PNL TOTAL CA: CPT

## 2025-01-25 PROCEDURE — 84443 ASSAY THYROID STIM HORMONE: CPT

## 2025-01-25 RX ORDER — GUAIFENESIN 600 MG/1
600 TABLET, EXTENDED RELEASE ORAL 2 TIMES DAILY
Qty: 14 TABLET | Refills: 0 | Status: SHIPPED | OUTPATIENT
Start: 2025-01-25 | End: 2025-02-01

## 2025-01-25 RX ORDER — FERROUS SULFATE 325(65) MG
325 TABLET ORAL
Qty: 30 TABLET | Refills: 0 | Status: SHIPPED | OUTPATIENT
Start: 2025-01-26 | End: 2025-02-25

## 2025-01-25 RX ORDER — MAGNESIUM SULFATE IN WATER 40 MG/ML
2000 INJECTION, SOLUTION INTRAVENOUS ONCE
Status: COMPLETED | OUTPATIENT
Start: 2025-01-25 | End: 2025-01-25

## 2025-01-25 RX ORDER — SODIUM CHLORIDE 1 G/1
1 TABLET ORAL
Status: DISCONTINUED | OUTPATIENT
Start: 2025-01-25 | End: 2025-01-25 | Stop reason: HOSPADM

## 2025-01-25 RX ORDER — METOPROLOL TARTRATE 25 MG/1
25 TABLET, FILM COATED ORAL 2 TIMES DAILY
Qty: 20 TABLET | Refills: 0 | Status: SHIPPED | OUTPATIENT
Start: 2025-01-25 | End: 2025-02-04

## 2025-01-25 RX ORDER — MAGNESIUM OXIDE 400 MG/1
400 TABLET ORAL DAILY
Qty: 7 TABLET | Refills: 0 | Status: SHIPPED | OUTPATIENT
Start: 2025-01-25 | End: 2025-02-01

## 2025-01-25 RX ORDER — SODIUM CHLORIDE 1 G/1
1 TABLET ORAL
Qty: 6 TABLET | Refills: 0 | Status: SHIPPED | OUTPATIENT
Start: 2025-01-25 | End: 2025-01-27

## 2025-01-25 RX ADMIN — FAMOTIDINE 20 MG: 20 TABLET, FILM COATED ORAL at 09:41

## 2025-01-25 RX ADMIN — SODIUM CHLORIDE 3000 MG: 900 INJECTION INTRAVENOUS at 05:19

## 2025-01-25 RX ADMIN — FERROUS SULFATE TAB 325 MG (65 MG ELEMENTAL FE) 325 MG: 325 (65 FE) TAB at 09:41

## 2025-01-25 RX ADMIN — METOPROLOL TARTRATE 25 MG: 25 TABLET, FILM COATED ORAL at 09:41

## 2025-01-25 RX ADMIN — Medication 1 CAPSULE: at 09:41

## 2025-01-25 RX ADMIN — SODIUM CHLORIDE 3000 MG: 900 INJECTION INTRAVENOUS at 11:36

## 2025-01-25 RX ADMIN — MAGNESIUM SULFATE HEPTAHYDRATE 2000 MG: 40 INJECTION, SOLUTION INTRAVENOUS at 09:44

## 2025-01-25 RX ADMIN — Medication: at 11:33

## 2025-01-25 RX ADMIN — Medication 1 G: at 11:33

## 2025-01-25 NOTE — FLOWSHEET NOTE
01/25/25 1406   AVS Reviewed   AVS & discharge instructions reviewed with patient and/or representative? Yes   Reviewed instructions with Patient   Level of Understanding Verbalized understanding

## 2025-01-25 NOTE — PLAN OF CARE
Problem: Discharge Planning  Goal: Discharge to home or other facility with appropriate resources  1/25/2025 1144 by Holland Casas RN  Outcome: Progressing  1/25/2025 0600 by Lupe Sarmiento RN  Outcome: Progressing     Problem: Skin/Tissue Integrity  Goal: Absence of new skin breakdown  Description: 1.  Monitor for areas of redness and/or skin breakdown  2.  Assess vascular access sites hourly  3.  Every 4-6 hours minimum:  Change oxygen saturation probe site  4.  Every 4-6 hours:  If on nasal continuous positive airway pressure, respiratory therapy assess nares and determine need for appliance change or resting period.  1/25/2025 1144 by Holland Casas RN  Outcome: Progressing  1/25/2025 0600 by Lupe Sarmiento RN  Outcome: Progressing     Problem: Safety - Adult  Goal: Free from fall injury  1/25/2025 1144 by Holland Casas RN  Outcome: Progressing  1/25/2025 0600 by Lupe Sarmiento RN  Outcome: Progressing     Problem: Pain  Goal: Verbalizes/displays adequate comfort level or baseline comfort level  1/25/2025 1144 by Holland Casas RN  Outcome: Progressing  1/25/2025 0600 by Lupe Sarmiento RN  Outcome: Progressing      Writer spoke with patient. Patient plans on canceling appointment and making a new one. She was not able to do this at the time of phone call. She will call back

## 2025-01-25 NOTE — DISCHARGE SUMMARY
Hospitalist Discharge Summary   Admit Date:  2025 10:06 AM   DC Note date: 2025  Name:  July Kerr   Age:  64 y.o.  Sex:  female  :  1960   MRN:  [id number]   Room:    PCP:  Sara Newell APRN - NP    Presenting Complaint: Loss of Consciousness     Initial Admission Diagnosis: Septicemia (HCC) [A41.9]  Near syncope [R55]  Acute cystitis without hematuria [N30.00]  Sepsis (HCC) [A41.9]     Problem List for this Hospitalization (present on admission):    Principal Problem:    Sepsis (HCC)  Active Problems:    Large mass of right breast    Malignant phylloides tumor of right breast    High anion gap metabolic acidosis    Acute UTI    Hypoalbuminemia due to protein-calorie malnutrition (HCC)    Near syncope    Elevated d-dimer    Moderate protein-calorie malnutrition (HCC)  Resolved Problems:    * No resolved hospital problems. *    H&P  July Kerr is a 64 y.o. female with medical history of malignant phyllodes tumor of right breast with axillary lymphadenopathy, presented to the ER today with chief complaint of near syncopal event.  Patient reported having weakness and numbness prior to having near syncopal event today.  Patient reports having loose stools for the past few days, had 2 episodes of diarrhea this morning.  She denies having any vomiting nausea.  Patient has been n.p.o. since midnight as she was scheduled for bilateral mastectomy by Dr. Cabral today.  Patient denies any acute fever, chills, shortness of breath, cough congestion.  She does report of extensive swelling in right breast with some discomfort and heaviness.  She denies having any paresthesia, headache, blurry vision/diplopia.  VS on arrival: Tmax 97.9, RR 16-22, , /62, room air sats of 97%.  Blood work remarkable for anion gap 18, lactic acid 2.0, Pro-Gopi 2.03, albumin 1.9, , WBC 14.5 K, UA with 3+ bacteria, small leukocyte esterase.  CT chest negative for PE, large right breast mass  Last 24 Hrs:  Recent Results (from the past 24 hour(s))   Echo (TTE) complete (PRN contrast/bubble/strain/3D)    Collection Time: 01/24/25  1:55 PM   Result Value Ref Range    LA Minor Axis 6.4 cm    LA Major Axis 6.2 cm    LA Area 2C 20.5 cm2    LA Area 4C 20.8 cm2    LA Volume MOD A2C 54 (A) 22 - 52 mL    LA Volume MOD A4C 56 (A) 22 - 52 mL    LA Volume BP 55 (A) 22 - 52 mL    RA Area 4C 18.1 cm2    RA Volume 51 ml    AV Mean Velocity 1.1 m/s    AV Mean Gradient 6 mmHg    AV VTI 29.1 cm    AV Peak Velocity 1.8 m/s    AV Peak Gradient 13 mmHg    AV Area by VTI 2.9 cm2    AV Area by Peak Velocity 2.6 cm2    Aortic Root 3.3 cm    Ascending Aorta 3.5 cm    IVSd 0.8 0.6 - 0.9 cm    LVIDd 5.1 3.9 - 5.3 cm    LVIDs 3.6 cm    LVOT Diameter 2.2 cm    LVOT Mean Gradient 3 mmHg    LVOT VTI 22.1 cm    LVOT Peak Velocity 1.2 m/s    LVOT Peak Gradient 6 mmHg    LVPWd 0.8 0.6 - 0.9 cm    LV E' Lateral Velocity 10.70 cm/s    LV E' Septal Velocity 7.74 cm/s    LVOT Area 3.8 cm2    LVOT SV 84.0 ml    PV .0 ms    PV Max Velocity 1.2 m/s    PV Peak Gradient 6 mmHg    RV Free Wall Peak S' 19.2 cm/s    TAPSE 2.6 1.7 cm    Body Surface Area 1.95 m2    Fractional Shortening 2D 29 28 - 44 %    LVIDd Index 2.68 cm/m2    LVIDs Index 1.89 cm/m2    LV RWT Ratio 0.31     LV Mass 2D 140.5 67 - 162 g    LV Mass 2D Index 73.9 43 - 95 g/m2    LA Volume Index BP 29 16 - 34 ml/m2    LVOT Stroke Volume Index 44.2 mL/m2    LA Volume Index MOD A2C 28 16 - 34 ml/m2    LA Volume Index MOD A4C 29 16 - 34 ml/m2    RA Volume Index A4C 27 mL/m2    Ao Root Index 1.74 cm/m2    Ascending Aorta Index 1.84 cm/m2    AV Velocity Ratio 0.67     LVOT:AV VTI Index 0.76     YURI/BSA VTI 1.5 cm2/m2    YURI/BSA Peak Velocity 1.4 cm2/m2    LV EDV A2C 123 mL    LV EDV A4C 116 mL    LV ESV A2C 54 mL    LV ESV A4C 50 mL    LV Ejection Fraction A2C 56 %    LV Ejection Fraction A4C 57 %    EF BP 56 55 - 100 %    LV ESV Index A4C 26 mL/m2    LV EDV Index A4C 61 mL/m2    LV

## 2025-01-25 NOTE — CARE COORDINATION
Pt is for discharge home today with [facility] (RN, PT/OT).  Referral called/faxed to [facility] for follow up home care as ordered.  No additional CM orders received or supportive care needs expressed at this time.

## 2025-01-25 NOTE — DISCHARGE INSTRUCTIONS
ADVISED TO TAKE 3-4 MINUTES WHEN CHANGING POSITION FROM LYING TO SITTING, SITTING TO STANDING.  ADVISED TO WALK SLOWLY - COUPLE OF STEPS AT A TIME- REST FOR COUPLE MINUTES AND AGAIN WALK COUPLE OF STEPS.  COME BACK TO ER IF ANY NEW RIGHT BREAST PAIN OR ANY NEW FEVER OR ANY NEW SHORTNESS OF BREATH OR DIZZINESS OR PASSING OUT SPELLS.  ADVISED TO DRINK ATLEAST 2 LIT OF FLUIDS/DAY.  F/U WITH PCP  IN A WEEK WITH CBC,BMP AND MAGNESIUM.  METOPROLOL- HOLD IF SYSTOLIC LESS THEN 100 MMHG OR HEART RATE LESS THEN 55/MIN.

## 2025-01-27 ENCOUNTER — ANESTHESIA EVENT (OUTPATIENT)
Dept: SURGERY | Age: 65
End: 2025-01-27
Payer: COMMERCIAL

## 2025-01-27 ENCOUNTER — FOLLOWUP TELEPHONE ENCOUNTER (OUTPATIENT)
Dept: CASE MANAGEMENT | Age: 65
End: 2025-01-27

## 2025-01-27 NOTE — PERIOP NOTE
Phone pre-assessment completed.    Verified name & . Order to obtain consent not found in EHR, however patient verifies case posting.    Type 1B surgery,  assessment complete.  Orders not received.    Labs per surgeon: unknown  Labs per anesthesia protocol: labs from recent hospitalization reviewed. Pt reports 2 blood transfusions during hospitalization -25. Pt was seen in hospital by surgeon on 25 during hospitalization. This information has been provided on procedure pass to anesthesiologist and chart marked for review. Repeat HGB DOS per Ephraim AGRAWAL.     Patient answered medical/surgical history questions at their best of ability. All prior to admission medications documented in EPIC.    Patient instructed to continue all prescribed medications unless otherwise instructed below:    Prescription meds to hold: none, however stop all vitamins & supplements 7 days prior to surgery and stop all NSAIDS (ASPIRIN/Excedrin/BC & Goody Powder, ibuprofen/Motrin/Advil, naproxen/Aleve) 5 days before your surgery. Should you have a surgery date that does not allow for the amount of time instructed above, please stop taking vitamins, supplements, and NSAIDS IMMEDIATELY. ------- ONLY Tylenol (acetaminophen) may be taken as needed prior to surgery.    Patient instructed to take ONLY the following medications day of surgery per anesthesia guidelines with sip of water: metoprolol (Lopressor) as indicated by parametersdaly (Mucinex). Continue all prescription medication the day/night prior to surgery unless other wise instructed above.    If you have never been diagnosed with liver disease, take Acetaminophen 1000mg in the morning and then again before bed one day prior to surgery date. You may substitute Tylenol Regular Strength.    Instructed on the following:  > No food after midnight the night before surgery. UPDATE: a recent change per Dawood AGRAWAL, Please finish 32 ounces of non-caffeinated clear

## 2025-01-27 NOTE — PROGRESS NOTES
Sepsis nurse navigator contacted patient for first phone call following discharge. Patient has bilateral mastectomy scheduled for 2/3/25 with Dr Cabral. Patient is scheduled for PCP appointment on 1/28/25 at 9AM for hospital f/u. Patient informed of appointment and stated that she may not be able to make it since she continues to feel ill. Brandi TOLLIVER has not made first visit at time of call. RN navigator will continue to follow.

## 2025-02-03 ENCOUNTER — APPOINTMENT (OUTPATIENT)
Dept: NUCLEAR MEDICINE | Age: 65
End: 2025-02-03
Attending: SURGERY
Payer: COMMERCIAL

## 2025-02-03 ENCOUNTER — ANESTHESIA (OUTPATIENT)
Dept: SURGERY | Age: 65
End: 2025-02-03
Payer: COMMERCIAL

## 2025-02-03 ENCOUNTER — HOSPITAL ENCOUNTER (OUTPATIENT)
Age: 65
Setting detail: OBSERVATION
Discharge: HOME | End: 2025-02-04
Attending: SURGERY | Admitting: SURGERY
Payer: COMMERCIAL

## 2025-02-03 DIAGNOSIS — Z90.13 S/P BILATERAL MASTECTOMY: Primary | ICD-10-CM

## 2025-02-03 DIAGNOSIS — R94.8 ABNORMAL POSITRON EMISSION TOMOGRAPHY (PET) OF COLON: ICD-10-CM

## 2025-02-03 PROBLEM — D24.1: Status: ACTIVE | Noted: 2025-02-03

## 2025-02-03 LAB
ABO + RH BLD: NORMAL
BLOOD GROUP ANTIBODIES SERPL: NORMAL
HGB BLD-MCNC: 10 G/DL (ref 11.7–15.4)
SPECIMEN EXP DATE BLD: NORMAL

## 2025-02-03 PROCEDURE — 3600000012 HC SURGERY LEVEL 2 ADDTL 15MIN: Performed by: SURGERY

## 2025-02-03 PROCEDURE — G0378 HOSPITAL OBSERVATION PER HR: HCPCS

## 2025-02-03 PROCEDURE — 2709999900 HC NON-CHARGEABLE SUPPLY: Performed by: SURGERY

## 2025-02-03 PROCEDURE — 6360000002 HC RX W HCPCS: Performed by: SURGERY

## 2025-02-03 PROCEDURE — 6370000000 HC RX 637 (ALT 250 FOR IP): Performed by: SURGERY

## 2025-02-03 PROCEDURE — 3600000002 HC SURGERY LEVEL 2 BASE: Performed by: SURGERY

## 2025-02-03 PROCEDURE — 99024 POSTOP FOLLOW-UP VISIT: CPT | Performed by: SURGERY

## 2025-02-03 PROCEDURE — 6360000002 HC RX W HCPCS: Performed by: REGISTERED NURSE

## 2025-02-03 PROCEDURE — 7100000001 HC PACU RECOVERY - ADDTL 15 MIN: Performed by: SURGERY

## 2025-02-03 PROCEDURE — 19303 MAST SIMPLE COMPLETE: CPT | Performed by: SURGERY

## 2025-02-03 PROCEDURE — 86850 RBC ANTIBODY SCREEN: CPT

## 2025-02-03 PROCEDURE — 3700000001 HC ADD 15 MINUTES (ANESTHESIA): Performed by: SURGERY

## 2025-02-03 PROCEDURE — 38525 BIOPSY/REMOVAL LYMPH NODES: CPT | Performed by: SURGERY

## 2025-02-03 PROCEDURE — 86901 BLOOD TYPING SEROLOGIC RH(D): CPT

## 2025-02-03 PROCEDURE — 2500000003 HC RX 250 WO HCPCS: Performed by: SURGERY

## 2025-02-03 PROCEDURE — 7100000000 HC PACU RECOVERY - FIRST 15 MIN: Performed by: SURGERY

## 2025-02-03 PROCEDURE — 3700000000 HC ANESTHESIA ATTENDED CARE: Performed by: SURGERY

## 2025-02-03 PROCEDURE — 3430000000 HC RX DIAGNOSTIC RADIOPHARMACEUTICAL: Performed by: SURGERY

## 2025-02-03 PROCEDURE — 85018 HEMOGLOBIN: CPT

## 2025-02-03 PROCEDURE — 78195 LYMPH SYSTEM IMAGING: CPT

## 2025-02-03 PROCEDURE — 2580000003 HC RX 258: Performed by: REGISTERED NURSE

## 2025-02-03 PROCEDURE — A9541 TC99M SULFUR COLLOID: HCPCS | Performed by: SURGERY

## 2025-02-03 PROCEDURE — 2580000003 HC RX 258: Performed by: SURGERY

## 2025-02-03 PROCEDURE — 88307 TISSUE EXAM BY PATHOLOGIST: CPT

## 2025-02-03 PROCEDURE — 6370000000 HC RX 637 (ALT 250 FOR IP): Performed by: ANESTHESIOLOGY

## 2025-02-03 PROCEDURE — 38900 IO MAP OF SENT LYMPH NODE: CPT | Performed by: SURGERY

## 2025-02-03 PROCEDURE — 2500000003 HC RX 250 WO HCPCS: Performed by: REGISTERED NURSE

## 2025-02-03 PROCEDURE — 2580000003 HC RX 258: Performed by: ANESTHESIOLOGY

## 2025-02-03 PROCEDURE — 86900 BLOOD TYPING SEROLOGIC ABO: CPT

## 2025-02-03 RX ORDER — SODIUM CHLORIDE, SODIUM LACTATE, POTASSIUM CHLORIDE, CALCIUM CHLORIDE 600; 310; 30; 20 MG/100ML; MG/100ML; MG/100ML; MG/100ML
INJECTION, SOLUTION INTRAVENOUS CONTINUOUS
Status: DISCONTINUED | OUTPATIENT
Start: 2025-02-03 | End: 2025-02-03 | Stop reason: HOSPADM

## 2025-02-03 RX ORDER — BUPIVACAINE HYDROCHLORIDE 2.5 MG/ML
INJECTION, SOLUTION EPIDURAL; INFILTRATION; INTRACAUDAL PRN
Status: DISCONTINUED | OUTPATIENT
Start: 2025-02-03 | End: 2025-02-03 | Stop reason: ALTCHOICE

## 2025-02-03 RX ORDER — SODIUM CHLORIDE 9 MG/ML
INJECTION, SOLUTION INTRAVENOUS PRN
Status: DISCONTINUED | OUTPATIENT
Start: 2025-02-03 | End: 2025-02-03 | Stop reason: HOSPADM

## 2025-02-03 RX ORDER — NALOXONE HYDROCHLORIDE 0.4 MG/ML
INJECTION, SOLUTION INTRAMUSCULAR; INTRAVENOUS; SUBCUTANEOUS PRN
Status: DISCONTINUED | OUTPATIENT
Start: 2025-02-03 | End: 2025-02-03 | Stop reason: HOSPADM

## 2025-02-03 RX ORDER — PROCHLORPERAZINE EDISYLATE 5 MG/ML
5 INJECTION INTRAMUSCULAR; INTRAVENOUS
Status: DISCONTINUED | OUTPATIENT
Start: 2025-02-03 | End: 2025-02-03 | Stop reason: HOSPADM

## 2025-02-03 RX ORDER — OXYCODONE HYDROCHLORIDE 5 MG/1
5 TABLET ORAL
Status: DISCONTINUED | OUTPATIENT
Start: 2025-02-03 | End: 2025-02-03 | Stop reason: HOSPADM

## 2025-02-03 RX ORDER — PROPOFOL 10 MG/ML
INJECTION, EMULSION INTRAVENOUS
Status: DISCONTINUED | OUTPATIENT
Start: 2025-02-03 | End: 2025-02-03 | Stop reason: SDUPTHER

## 2025-02-03 RX ORDER — MEPERIDINE HYDROCHLORIDE 25 MG/ML
12.5 INJECTION INTRAMUSCULAR; INTRAVENOUS; SUBCUTANEOUS EVERY 5 MIN PRN
Status: DISCONTINUED | OUTPATIENT
Start: 2025-02-03 | End: 2025-02-03 | Stop reason: HOSPADM

## 2025-02-03 RX ORDER — ROCURONIUM BROMIDE 10 MG/ML
INJECTION, SOLUTION INTRAVENOUS
Status: DISCONTINUED | OUTPATIENT
Start: 2025-02-03 | End: 2025-02-03 | Stop reason: SDUPTHER

## 2025-02-03 RX ORDER — LIDOCAINE HYDROCHLORIDE 10 MG/ML
1 INJECTION, SOLUTION INFILTRATION; PERINEURAL
Status: DISCONTINUED | OUTPATIENT
Start: 2025-02-03 | End: 2025-02-03 | Stop reason: HOSPADM

## 2025-02-03 RX ORDER — DEXAMETHASONE SODIUM PHOSPHATE 4 MG/ML
INJECTION, SOLUTION INTRA-ARTICULAR; INTRALESIONAL; INTRAMUSCULAR; INTRAVENOUS; SOFT TISSUE
Status: DISCONTINUED | OUTPATIENT
Start: 2025-02-03 | End: 2025-02-03 | Stop reason: SDUPTHER

## 2025-02-03 RX ORDER — IBUPROFEN 600 MG/1
1 TABLET ORAL PRN
Status: DISCONTINUED | OUTPATIENT
Start: 2025-02-03 | End: 2025-02-03 | Stop reason: HOSPADM

## 2025-02-03 RX ORDER — OXYCODONE HYDROCHLORIDE 5 MG/1
10 TABLET ORAL EVERY 4 HOURS PRN
Status: DISCONTINUED | OUTPATIENT
Start: 2025-02-03 | End: 2025-02-04 | Stop reason: HOSPADM

## 2025-02-03 RX ORDER — FENTANYL CITRATE 50 UG/ML
25 INJECTION, SOLUTION INTRAMUSCULAR; INTRAVENOUS EVERY 5 MIN PRN
Status: DISCONTINUED | OUTPATIENT
Start: 2025-02-03 | End: 2025-02-03 | Stop reason: HOSPADM

## 2025-02-03 RX ORDER — ONDANSETRON 2 MG/ML
INJECTION INTRAMUSCULAR; INTRAVENOUS
Status: DISCONTINUED | OUTPATIENT
Start: 2025-02-03 | End: 2025-02-03 | Stop reason: SDUPTHER

## 2025-02-03 RX ORDER — SODIUM CHLORIDE 0.9 % (FLUSH) 0.9 %
5-40 SYRINGE (ML) INJECTION PRN
Status: DISCONTINUED | OUTPATIENT
Start: 2025-02-03 | End: 2025-02-03 | Stop reason: HOSPADM

## 2025-02-03 RX ORDER — FENTANYL CITRATE 50 UG/ML
INJECTION, SOLUTION INTRAMUSCULAR; INTRAVENOUS
Status: DISCONTINUED | OUTPATIENT
Start: 2025-02-03 | End: 2025-02-03 | Stop reason: SDUPTHER

## 2025-02-03 RX ORDER — ACETAMINOPHEN 500 MG
1000 TABLET ORAL ONCE
Status: COMPLETED | OUTPATIENT
Start: 2025-02-03 | End: 2025-02-03

## 2025-02-03 RX ORDER — SODIUM CHLORIDE 0.9 % (FLUSH) 0.9 %
5-40 SYRINGE (ML) INJECTION EVERY 12 HOURS SCHEDULED
Status: DISCONTINUED | OUTPATIENT
Start: 2025-02-03 | End: 2025-02-03 | Stop reason: HOSPADM

## 2025-02-03 RX ORDER — ACETAMINOPHEN 500 MG
1000 TABLET ORAL EVERY 6 HOURS PRN
Status: DISCONTINUED | OUTPATIENT
Start: 2025-02-03 | End: 2025-02-04 | Stop reason: HOSPADM

## 2025-02-03 RX ORDER — MORPHINE SULFATE 4 MG/ML
6 INJECTION INTRAVENOUS
Status: DISCONTINUED | OUTPATIENT
Start: 2025-02-03 | End: 2025-02-04 | Stop reason: HOSPADM

## 2025-02-03 RX ORDER — ESMOLOL HYDROCHLORIDE 10 MG/ML
INJECTION INTRAVENOUS
Status: DISCONTINUED | OUTPATIENT
Start: 2025-02-03 | End: 2025-02-03 | Stop reason: SDUPTHER

## 2025-02-03 RX ORDER — MORPHINE SULFATE 2 MG/ML
2 INJECTION, SOLUTION INTRAMUSCULAR; INTRAVENOUS
Status: DISCONTINUED | OUTPATIENT
Start: 2025-02-03 | End: 2025-02-04 | Stop reason: HOSPADM

## 2025-02-03 RX ORDER — FAMOTIDINE 20 MG/1
20 TABLET, FILM COATED ORAL 2 TIMES DAILY
Status: DISCONTINUED | OUTPATIENT
Start: 2025-02-03 | End: 2025-02-04 | Stop reason: HOSPADM

## 2025-02-03 RX ORDER — ONDANSETRON 2 MG/ML
4 INJECTION INTRAMUSCULAR; INTRAVENOUS EVERY 6 HOURS PRN
Status: DISCONTINUED | OUTPATIENT
Start: 2025-02-03 | End: 2025-02-04 | Stop reason: HOSPADM

## 2025-02-03 RX ORDER — MORPHINE SULFATE 4 MG/ML
4 INJECTION, SOLUTION INTRAMUSCULAR; INTRAVENOUS
Status: DISCONTINUED | OUTPATIENT
Start: 2025-02-03 | End: 2025-02-04 | Stop reason: HOSPADM

## 2025-02-03 RX ORDER — SUCCINYLCHOLINE CHLORIDE 20 MG/ML
INJECTION INTRAMUSCULAR; INTRAVENOUS
Status: DISCONTINUED | OUTPATIENT
Start: 2025-02-03 | End: 2025-02-03 | Stop reason: SDUPTHER

## 2025-02-03 RX ORDER — DIPHENHYDRAMINE HYDROCHLORIDE 50 MG/ML
12.5 INJECTION INTRAMUSCULAR; INTRAVENOUS
Status: DISCONTINUED | OUTPATIENT
Start: 2025-02-03 | End: 2025-02-03 | Stop reason: HOSPADM

## 2025-02-03 RX ORDER — MIDAZOLAM HYDROCHLORIDE 1 MG/ML
INJECTION, SOLUTION INTRAMUSCULAR; INTRAVENOUS
Status: DISCONTINUED | OUTPATIENT
Start: 2025-02-03 | End: 2025-02-03 | Stop reason: SDUPTHER

## 2025-02-03 RX ORDER — MAGNESIUM OXIDE TAB 400 MG (241.3 MG ELEMENTAL MG) 400 (241.3 MG) MG
400 TAB ORAL DAILY
COMMUNITY
Start: 2025-01-25

## 2025-02-03 RX ORDER — DEXTROSE MONOHYDRATE, SODIUM CHLORIDE, AND POTASSIUM CHLORIDE 50; 1.49; 4.5 G/1000ML; G/1000ML; G/1000ML
INJECTION, SOLUTION INTRAVENOUS CONTINUOUS
Status: DISCONTINUED | OUTPATIENT
Start: 2025-02-03 | End: 2025-02-04

## 2025-02-03 RX ORDER — LIDOCAINE HYDROCHLORIDE 20 MG/ML
INJECTION, SOLUTION EPIDURAL; INFILTRATION; INTRACAUDAL; PERINEURAL
Status: DISCONTINUED | OUTPATIENT
Start: 2025-02-03 | End: 2025-02-03 | Stop reason: SDUPTHER

## 2025-02-03 RX ORDER — ONDANSETRON 2 MG/ML
4 INJECTION INTRAMUSCULAR; INTRAVENOUS
Status: DISCONTINUED | OUTPATIENT
Start: 2025-02-03 | End: 2025-02-03 | Stop reason: HOSPADM

## 2025-02-03 RX ORDER — MIDAZOLAM HYDROCHLORIDE 2 MG/2ML
2 INJECTION, SOLUTION INTRAMUSCULAR; INTRAVENOUS
Status: DISCONTINUED | OUTPATIENT
Start: 2025-02-03 | End: 2025-02-03 | Stop reason: HOSPADM

## 2025-02-03 RX ORDER — OXYCODONE HYDROCHLORIDE 5 MG/1
5 TABLET ORAL EVERY 4 HOURS PRN
Status: DISCONTINUED | OUTPATIENT
Start: 2025-02-03 | End: 2025-02-04 | Stop reason: HOSPADM

## 2025-02-03 RX ORDER — DEXTROSE MONOHYDRATE 100 MG/ML
INJECTION, SOLUTION INTRAVENOUS CONTINUOUS PRN
Status: DISCONTINUED | OUTPATIENT
Start: 2025-02-03 | End: 2025-02-03 | Stop reason: HOSPADM

## 2025-02-03 RX ADMIN — HYDROMORPHONE HYDROCHLORIDE 0.2 MG: 0.5 INJECTION, SOLUTION INTRAMUSCULAR; INTRAVENOUS; SUBCUTANEOUS at 14:27

## 2025-02-03 RX ADMIN — SODIUM CHLORIDE, SODIUM LACTATE, POTASSIUM CHLORIDE, AND CALCIUM CHLORIDE: 600; 310; 30; 20 INJECTION, SOLUTION INTRAVENOUS at 12:41

## 2025-02-03 RX ADMIN — PHENYLEPHRINE HYDROCHLORIDE 100 MCG: 10 INJECTION INTRAVENOUS at 13:53

## 2025-02-03 RX ADMIN — LIDOCAINE HYDROCHLORIDE 80 MG: 20 INJECTION, SOLUTION EPIDURAL; INFILTRATION; INTRACAUDAL; PERINEURAL at 12:52

## 2025-02-03 RX ADMIN — ACETAMINOPHEN 1000 MG: 500 TABLET, FILM COATED ORAL at 11:35

## 2025-02-03 RX ADMIN — ROCURONIUM BROMIDE 5 MG: 10 INJECTION, SOLUTION INTRAVENOUS at 12:52

## 2025-02-03 RX ADMIN — CEFAZOLIN 2000 MG: 2 INJECTION, POWDER, FOR SOLUTION INTRAMUSCULAR; INTRAVENOUS at 13:03

## 2025-02-03 RX ADMIN — MIDAZOLAM 2 MG: 1 INJECTION INTRAMUSCULAR; INTRAVENOUS at 12:38

## 2025-02-03 RX ADMIN — HYDROMORPHONE HYDROCHLORIDE 0.4 MG: 0.5 INJECTION, SOLUTION INTRAMUSCULAR; INTRAVENOUS; SUBCUTANEOUS at 13:37

## 2025-02-03 RX ADMIN — PROPOFOL 140 MG: 10 INJECTION, EMULSION INTRAVENOUS at 12:52

## 2025-02-03 RX ADMIN — HYDROMORPHONE HYDROCHLORIDE 0.6 MG: 0.5 INJECTION, SOLUTION INTRAMUSCULAR; INTRAVENOUS; SUBCUTANEOUS at 13:18

## 2025-02-03 RX ADMIN — HYDROMORPHONE HYDROCHLORIDE 0.4 MG: 0.5 INJECTION, SOLUTION INTRAMUSCULAR; INTRAVENOUS; SUBCUTANEOUS at 15:05

## 2025-02-03 RX ADMIN — TECHNETIUM TC 99M SULFUR COLLOID 0.5 MILLICURIE: KIT at 10:50

## 2025-02-03 RX ADMIN — DEXAMETHASONE SODIUM PHOSPHATE 10 MG: 4 INJECTION INTRA-ARTICULAR; INTRALESIONAL; INTRAMUSCULAR; INTRAVENOUS; SOFT TISSUE at 13:10

## 2025-02-03 RX ADMIN — FAMOTIDINE 20 MG: 20 TABLET, FILM COATED ORAL at 20:50

## 2025-02-03 RX ADMIN — ESMOLOL HYDROCHLORIDE 30 MG: 10 INJECTION INTRAVENOUS at 14:08

## 2025-02-03 RX ADMIN — ONDANSETRON 4 MG: 2 INJECTION INTRAMUSCULAR; INTRAVENOUS at 13:10

## 2025-02-03 RX ADMIN — FENTANYL CITRATE 25 MCG: 50 INJECTION, SOLUTION INTRAMUSCULAR; INTRAVENOUS at 12:52

## 2025-02-03 RX ADMIN — Medication 120 MG: at 12:53

## 2025-02-03 RX ADMIN — FENTANYL CITRATE 75 MCG: 50 INJECTION, SOLUTION INTRAMUSCULAR; INTRAVENOUS at 13:01

## 2025-02-03 RX ADMIN — SODIUM CHLORIDE 1500 MG: 9 INJECTION, SOLUTION INTRAVENOUS at 21:26

## 2025-02-03 RX ADMIN — ESMOLOL HYDROCHLORIDE 30 MG: 10 INJECTION INTRAVENOUS at 13:31

## 2025-02-03 RX ADMIN — DEXTROSE MONOHYDRATE, SODIUM CHLORIDE, AND POTASSIUM CHLORIDE: 50; 4.5; 1.49 INJECTION, SOLUTION INTRAVENOUS at 20:53

## 2025-02-03 RX ADMIN — PROPOFOL 50 MG: 10 INJECTION, EMULSION INTRAVENOUS at 13:18

## 2025-02-03 ASSESSMENT — PAIN - FUNCTIONAL ASSESSMENT
PAIN_FUNCTIONAL_ASSESSMENT: NONE - DENIES PAIN
PAIN_FUNCTIONAL_ASSESSMENT: NONE - DENIES PAIN
PAIN_FUNCTIONAL_ASSESSMENT: 0-10
PAIN_FUNCTIONAL_ASSESSMENT: NONE - DENIES PAIN

## 2025-02-03 ASSESSMENT — PAIN SCALES - GENERAL: PAINLEVEL_OUTOF10: 0

## 2025-02-03 NOTE — PROGRESS NOTES
Spiritual Health History and Assessment/Progress Note  [facility]    Pre-Op,  ,  ,      Name: July Kerr MRN: [id number]    Age: 64 y.o.     Sex: female   Language: English   Yazdanism: None   Personal history of malignant phylloides tumor of breast     Date: 2/3/2025            Total Time Calculated: 10 min              Spiritual Assessment began in [facility]        Referral/Consult From: Family   Encounter Overview/Reason: Pre-Op  Service Provided For: Patient and family together    [place], Belief, Meaning:   Patient has beliefs or practices that help with coping during difficult times  Family/Friends have beliefs or practices that help with coping during difficult times      Importance and Influence:  Patient unable to assess at this time  Family/Friends have spiritual/personal beliefs that influence decisions regarding the patient's health    Community:  Patient feels well-supported. Support system includes: Parent/s and Children  Family/Friends feel well-supported. Support system includes: Extended family    Assessment and Plan of Care:     Patient Interventions include: Facilitated expression of thoughts and feelings and Other: prayer  Family/Friends Interventions include: Facilitated expression of thoughts and feelings, Explored spiritual coping/struggle/distress, and Other: prayer    Patient Plan of Care: Spiritual Care available upon further referral  Family/Friends Plan of Care: Spiritual Care available upon further referral    Electronically signed by Chaplain Jane on 2/3/2025 at 11:40 AM

## 2025-02-03 NOTE — OP NOTE
[place], [place] [id number] [phone]    OPERATVE REPORT    Name: July Kerr     Date of Surgery: 02/03/25  Med Record Number: [id number]   Age: 64 y.o.   Sex: female     Pre-operative Diagnosis: Malignant right breast phyllodes tumor  Post-operative Diagnosis: same    Procedure:   1.  Bilateral mastectomy (CPT 01946-14).  2.  Right axillary sentinel node excision following intraoperative mapping and identification (CPT 79454-19-IH, 45143+).    Surgeon: ROD POWERS MD  Asistants: none  Anesthesia:  General  Complications: none      Specimen:       ID Type Source Tests Collected by Time Destination   A : Left Prophylactic Mastectomy Tissue Breast SURGICAL PATHOLOGY Rod Powers MD 2/3/2025 1340    B : Right Mastectomy for Malignant Phyllodes Tumor short stitch marks superior margin long suture marks lateral margin Tissue Breast SURGICAL PATHOLOGY Rod Powers MD 2/3/2025 1505    C : RIGHT AXILLARY SENTINEL NODE CLUSTER Tissue Lymph Node SURGICAL PATHOLOGY Rod Powers MD 2/3/2025 1435    D : FINAL DEEP MARGIN RIGHT CHEST WALL Tissue Breast SURGICAL PATHOLOGY Rod Powers MD 2/3/2025 1512            Estimated Blood Loss: 250cc  Drains: 19 Round Chad drains x2       Findings:  Infection Present At Time Of Surgery (PATOS) (choose all levels that have infection present):  No infection present  Other Findings: none      [place] Node Biopsy for Breast Cancer    Operation performed with curative intent. Yes   Tracer(s) used to identify sentinel nodes in the upfront surgery (non-neoadjuvant) setting (select all that apply). Radioactive tracer   Tracer(s) used to identify sentinel nodes in the neoadjuvant setting (select all that apply). N/A   All nodes (colored or non-colored) present at the end of a dye-filled lymphatic channel were removed. N/A   All significantly radioactive nodes were removed. Yes   All palpably suspicious nodes were removed.

## 2025-02-03 NOTE — PROGRESS NOTES
TRANSFER - IN REPORT:    Verbal report received from CURT Seay on July Kerr  being received from [facility] PACU for routine progression of patient care      Report consisted of patient's Situation, Background, Assessment and   Recommendations(SBAR).     Information from the following report(s) Nurse Handoff Report was reviewed with the receiving nurse.    Opportunity for questions and clarification was provided.      Assessment to be completed upon patient's arrival to unit and care to be assumed.

## 2025-02-03 NOTE — PERIOP NOTE
TRANSFER - OUT REPORT:    Verbal report given to 2nd Floor RN on July Kerr  being transferred to [facility][note 3] for routine post-op       Report consisted of patient’s Situation, Background, Assessment and   Recommendations(SBAR).     Information from the following report(s) Nurse Handoff Report, Adult Overview, Surgery Report, Intake/Output, and MAR was reviewed with the receiving nurse.    Lines:   Peripheral IV 02/03/25 Left Wrist (Active)   Site Assessment Clean, dry & intact 02/03/25 1632   Line Status Flushed 02/03/25 1632   Line Care Connections checked and tightened 02/03/25 1632   Phlebitis Assessment No symptoms 02/03/25 1632   Infiltration Assessment 0 02/03/25 1632   Alcohol Cap Used No 02/03/25 1632   Dressing Status Clean, dry & intact 02/03/25 1632   Dressing Type Transparent 02/03/25 1632        Opportunity for questions and clarification was provided.      Patient transported with:   O2 @ 4 liters    VTE prophylaxis orders have been written for July Kerr.    Patient and family given floor number and nurses name.  Family updated re: pt status after security code verified.

## 2025-02-03 NOTE — H&P
H&P/Consult Note/Progress Note/Office Note:   July Kerr  MRN: [id number]  :1960  Age:64 y.o.    HPI: July Kerr is a 64 y.o. female who is here for bilateral mastectomy and right axillary sent node excision (no reconstruction)  for suspected massive malignant phyllodes tumor with abnormal indeterminate right axillary nodes on 2/3/25.      She was previously scheduled for the same surgery on 25  but this canceled as she had a pre-syncopal episode and was brought to the ER instead where she was admitted with sepsis from mastoiditis and UTI.  Leading up this admission she was profoundly weak and at times unable to get out of bed  WBC 17k  Low Na and Cl  +Leuk in urine  Chest CT showed no PE, effusion, or lung mets  Levaquin ordered for probably UTI  She was discharged on 24          She initially presented to surgery on 24 with a massive right breast neoplasm which was growing over many years.  She has large pendulous breasts and the right breast mass was massive and filled the majority of the breast.  She had imaging and biopsies shown below.        Maternal grandmother with history of breast cancer.    She did not come in for her office visit on 24--->I tried to call her but  no answer (left a generic message with no PHI)  She returned on 1/2/25        10/24/24 Bilat Dx mammo and right breast US  Hx: right breast mass  Estimated lifetime risk of breast cancer calculated to be 12.03% based on the Tyrer-Cuzick model, which is considered average risk.     MAMMOGRAM:  Tissue density: The breasts are heterogeneously dense, which may obscure small masses. (#BDC)     Within the right breast, there is a large partially calcified mass measuring at least 18 cm.   This occupies nearly the entire breast.   A few benign coarse calcifications are scattered in the right breast.     In the left breast, there is dense heterogeneous nodular tissue with scattered densities but no evidence of a

## 2025-02-03 NOTE — ANESTHESIA PRE PROCEDURE
Department of Anesthesiology  Preprocedure Note       Name:  July Kerr   Age:  64 y.o.  :  1960                                          MRN:  [id number]         Date:  2/3/2025      Surgeon: Surgeon(s):  Rod Cabral MD    Procedure: Procedure(s):  BREAST MASTECTOMY BILATERAL  AXILLARY RIGHT SENTINEL NODE BIOPSY PreOp:         9:00 am  SN:              10:30 am  OR:              12:30 pm    Medications prior to admission:   Prior to Admission medications    Medication Sig Start Date End Date Taking? Authorizing Provider   MAGNESIUM-OXIDE 400 (240 Mg) MG tablet Take 1 tablet by mouth daily 25  Yes Jaqueline Nath MD   ferrous sulfate (IRON 325) 325 (65 Fe) MG tablet Take 1 tablet by mouth daily (with breakfast) 25 Yes Kyleigh Moses MD   sodium chloride 1 g tablet Take 1 tablet by mouth 3 times daily (with meals) for 2 days 1/25/25 2/3/25 Yes Kyleigh Moses MD   acetaminophen (TYLENOL 8 HOUR ARTHRITIS PAIN) 650 MG extended release tablet Take 1 tablet by mouth every 8 hours as needed for Pain   Yes Jaqueline Nath MD   ondansetron (ZOFRAN) 4 MG tablet Take 1 tablet by mouth 3 times daily as needed for Nausea or Vomiting 24  Yes Elis Mahmood MD   metoprolol tartrate (LOPRESSOR) 25 MG tablet Take 1 tablet by mouth 2 times daily for 10 days HOLD IF SYSTOLIC BLOOD PRESSURE LESS THEN 100 MMHG 25  Kyleigh Moses MD       Current medications:    Current Facility-Administered Medications   Medication Dose Route Frequency Provider Last Rate Last Admin   • lidocaine 1 % injection 1 mL  1 mL IntraDERmal Once PRN Zackery Hay MD       • acetaminophen (TYLENOL) tablet 1,000 mg  1,000 mg Oral Once Zackery Hay MD       • lactated ringers infusion   IntraVENous Continuous Zackery Hay MD       • sodium chloride flush 0.9 % injection 5-40 mL  5-40 mL IntraVENous 2 times per day Zackery Hay MD       • sodium

## 2025-02-03 NOTE — ANESTHESIA POSTPROCEDURE EVALUATION
Department of Anesthesiology  Postprocedure Note    Patient: July Kerr  MRN: [id number]  YOB: 1960  Date of evaluation: 2/3/2025    Procedure Summary       Date: 02/03/25 Room / Location: [facility] MAIN OR 09 / [facility] MAIN OR    Anesthesia Start: 1238 Anesthesia Stop: 1557    Procedures:       BREAST MASTECTOMY BILATERAL (Bilateral: Breast)      AXILLARY RIGHT SENTINEL NODE BIOPSY (Right: Axilla) Diagnosis:       Mass of right breast      (Mass of right breast [N63.10])    Providers: Rod Cabral MD Responsible Provider: Zackery Hay MD    Anesthesia Type: General ASA Status: 3            Anesthesia Type: General    Buddy Phase I: Buddy Score: 10    Buddy Phase II:      Anesthesia Post Evaluation    Patient location during evaluation: PACU  Patient participation: complete - patient participated  Level of consciousness: awake and alert  Pain scale: pain adequately controlled.  Airway patency: patent  Nausea & Vomiting: no nausea and no vomiting  Cardiovascular status: hemodynamically stable  Respiratory status: acceptable  Hydration status: euvolemic  Multimodal analgesia pain management approach  Pain management: adequate    There were no known notable events for this encounter.

## 2025-02-03 NOTE — DISCHARGE INSTRUCTIONS
Dressings/Wound Care  Leave the mastectomy dressings alone until follow-up    Empty the drains as often as needed to keep them suctioning (compressed) at all times  Replace the dry gauze and tape around the drain exit site as needed for drainage    Try to keep the incisions as dry as possible to lower risk of infection.      Activity  No heavy lifting (>5-10lbs) for 6 weeks to reduce risk of swelling.  No driving for now    Pain prescription (Norco) electronically sent to your pharmacy    Follow-up with Dr Cabral in approx 2 weeks --Per Dr Cabral request Wednesday Feb 19 at 8:45am  --The hospital staff is to make this appt time for you before you leave the hospital  The appt is to be at:  [address] [place], [address]  [phone];  option 1 for the [facility] office    Diet  Resume prior diet        DISCHARGE SUMMARY from Nurse    PATIENT INSTRUCTIONS:    After general anesthesia or intravenous sedation, for 24 hours or while taking prescription Narcotics:  Limit your activities  Do not drive and operate hazardous machinery  Do not make important personal or business decisions  Do  not drink alcoholic beverages  If you have not urinated within 8 hours after discharge, please contact your surgeon on call.    Report the following to your surgeon:  Excessive pain, swelling, redness or odor of or around the surgical area  Temperature over 100.5  Nausea and vomiting lasting longer than 4 hours or if unable to take medications  Any signs of decreased circulation or nerve impairment to extremity: change in color, persistent  numbness, tingling, coldness or increase pain  Any questions    What to do at Home:  Recommended activity: activity as tolerated, no heavy lifting over 5lbs.     If you experience any of the following symptoms fever greater than 100.5, nausea/vomiting lasting longer than 4 hours and not relieved by anything, new onset of pain/swelling/drainage/odor/redness coming

## 2025-02-04 VITALS
HEIGHT: 66 IN | HEART RATE: 79 BPM | RESPIRATION RATE: 18 BRPM | OXYGEN SATURATION: 92 % | TEMPERATURE: 97.3 F | SYSTOLIC BLOOD PRESSURE: 127 MMHG | BODY MASS INDEX: 27.16 KG/M2 | DIASTOLIC BLOOD PRESSURE: 73 MMHG | WEIGHT: 169 LBS

## 2025-02-04 LAB
BASOPHILS # BLD: 0.03 K/UL (ref 0–0.2)
BASOPHILS NFR BLD: 0.4 % (ref 0–2)
DIFFERENTIAL METHOD BLD: ABNORMAL
EOSINOPHIL # BLD: 0 K/UL (ref 0–0.8)
EOSINOPHIL NFR BLD: 0 % (ref 0.5–7.8)
ERYTHROCYTE [DISTWIDTH] IN BLOOD BY AUTOMATED COUNT: 15.7 % (ref 11.9–14.6)
HCT VFR BLD AUTO: 23.9 % (ref 35.8–46.3)
HGB BLD-MCNC: 7.9 G/DL (ref 11.7–15.4)
IMM GRANULOCYTES # BLD AUTO: 0.14 K/UL (ref 0–0.5)
IMM GRANULOCYTES NFR BLD AUTO: 2.1 % (ref 0–5)
LYMPHOCYTES # BLD: 1.02 K/UL (ref 0.5–4.6)
LYMPHOCYTES NFR BLD: 15.2 % (ref 13–44)
MCH RBC QN AUTO: 28.4 PG (ref 26.1–32.9)
MCHC RBC AUTO-ENTMCNC: 33.1 G/DL (ref 31.4–35)
MCV RBC AUTO: 86 FL (ref 82–102)
MONOCYTES # BLD: 0.31 K/UL (ref 0.1–1.3)
MONOCYTES NFR BLD: 4.6 % (ref 4–12)
NEUTS SEG # BLD: 5.23 K/UL (ref 1.7–8.2)
NEUTS SEG NFR BLD: 77.7 % (ref 43–78)
NRBC # BLD: 0 K/UL (ref 0–0.2)
PLATELET # BLD AUTO: 380 K/UL (ref 150–450)
PMV BLD AUTO: 9.8 FL (ref 9.4–12.3)
RBC # BLD AUTO: 2.78 M/UL (ref 4.05–5.2)
WBC # BLD AUTO: 6.7 K/UL (ref 4.3–11.1)

## 2025-02-04 PROCEDURE — 99024 POSTOP FOLLOW-UP VISIT: CPT | Performed by: SURGERY

## 2025-02-04 PROCEDURE — G0378 HOSPITAL OBSERVATION PER HR: HCPCS

## 2025-02-04 PROCEDURE — 36415 COLL VENOUS BLD VENIPUNCTURE: CPT

## 2025-02-04 PROCEDURE — 6370000000 HC RX 637 (ALT 250 FOR IP): Performed by: SURGERY

## 2025-02-04 PROCEDURE — 2500000003 HC RX 250 WO HCPCS: Performed by: SURGERY

## 2025-02-04 PROCEDURE — 85025 COMPLETE CBC W/AUTO DIFF WBC: CPT

## 2025-02-04 RX ORDER — HYDROCODONE BITARTRATE AND ACETAMINOPHEN 5; 325 MG/1; MG/1
1-2 TABLET ORAL EVERY 8 HOURS PRN
Qty: 28 TABLET | Refills: 0 | Status: SHIPPED | OUTPATIENT
Start: 2025-02-04 | End: 2025-02-09

## 2025-02-04 RX ADMIN — DEXTROSE MONOHYDRATE, SODIUM CHLORIDE, AND POTASSIUM CHLORIDE: 50; 4.5; 1.49 INJECTION, SOLUTION INTRAVENOUS at 06:34

## 2025-02-04 RX ADMIN — FAMOTIDINE 20 MG: 20 TABLET, FILM COATED ORAL at 08:47

## 2025-02-04 NOTE — PLAN OF CARE
Problem: Skin/Tissue Integrity  Goal: Skin integrity remains intact  Description: 1.  Monitor for areas of redness and/or skin breakdown  2.  Assess vascular access sites hourly  3.  Every 4-6 hours minimum:  Change oxygen saturation probe site  4.  Every 4-6 hours:  If on nasal continuous positive airway pressure, respiratory therapy assess nares and determine need for appliance change or resting period  Outcome: Progressing  Flowsheets  Taken 2/4/2025 0742 by Shira Schreiber RN  Skin Integrity Remains Intact:   Monitor for areas of redness and/or skin breakdown   Assess vascular access sites hourly  Taken 2/3/2025 2020 by Sherine Canada RN  Skin Integrity Remains Intact: Monitor for areas of redness and/or skin breakdown     Problem: Pain  Goal: Verbalizes/displays adequate comfort level or baseline comfort level  Outcome: Progressing  Flowsheets (Taken 2/3/2025 2020 by Sherine Canada RN)  Verbalizes/displays adequate comfort level or baseline comfort level:   Encourage patient to monitor pain and request assistance   Assess pain using appropriate pain scale   Administer analgesics based on type and severity of pain and evaluate response     Problem: Safety - Adult  Goal: Free from fall injury  Outcome: Progressing  Flowsheets (Taken 2/4/2025 0742)  Free From Fall Injury: Instruct family/caregiver on patient safety     Problem: Discharge Planning  Goal: Discharge to home or other facility with appropriate resources  Outcome: Progressing

## 2025-02-04 NOTE — PROGRESS NOTES
0330: Difficulties with right SANDRA drain maintaining compression/suction. House supervisor notified for a need to send bulb to the floor for replacement.     0415: R SANDRA drain bulb replaced. Bulb maintaining compression/suction.    0630: Writer to assess R bulb: No longer holding compression/suction, despite several attempts to resolve. Dr Cabral notified of the ongoing issue this shift. Verbal order to place: gauze, various size tegaderms, scissors, and hemostats @ bedside.     0645: Patients PIV infiltrated.

## 2025-02-04 NOTE — PROGRESS NOTES
4 Eyes Skin Assessment     NAME:  July Kerr  YOB: 1960  MEDICAL RECORD NUMBER:  [id number]    The patient is being assessed for  Admission    I agree that at least one RN has performed a thorough Head to Toe Skin Assessment on the patient. ALL assessment sites listed below have been assessed.      Areas assessed by both nurses:    Head, Face, Ears, Shoulders, Back, Chest, Arms, Elbows, Hands, Sacrum. Buttock, Coccyx, Ischium, and Legs. Feet and Heels        Does the Patient have a Wound? No noted wound(s)       Garrison Prevention initiated by RN: No  Wound Care Orders initiated by RN: No    Pressure Injury (Stage 3,4, Unstageable, DTI, NWPT, and Complex wounds) if present, place Wound referral order by RN under : No    New Ostomies, if present place, Ostomy referral order under : No     Nurse 1 eSignature: Electronically signed by Sherine Canada RN on 2/4/25 at 3:46 AM EST    **SHARE this note so that the co-signing nurse can place an eSignature**    Nurse 2 eSignature: Electronically signed by IRENE CHAPIN RN on 2/4/25 at 4:53 AM EST

## 2025-02-04 NOTE — PROGRESS NOTES
H&P/Consult Note/Progress Note/Office Note:   July Kerr  MRN: [id number]  :1960  Age:64 y.o.    HPI: July Kerr is a 64 y.o. 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      She was previously scheduled for the same surgery on 25  but this canceled as she had a pre-syncopal episode and was brought to the ER instead where she was admitted with sepsis from mastoiditis and UTI.  Leading up this admission she was profoundly weak and at times unable to get out of bed  WBC 17k  Low Na and Cl  +Leuk in urine  Chest CT showed no PE, effusion, or lung mets  Levaquin ordered for probably UTI  She was discharged on 24          She initially presented to surgery on 24 with a massive right breast neoplasm which was growing over many years.  She has large pendulous breasts and the right breast mass was massive and filled the majority of the breast.  She had imaging and biopsies shown below.        Maternal grandmother with history of breast cancer.    She did not come in for her office visit on 24--->I tried to call her but  no answer (left a generic message with no PHI)  She returned on 1/2/25        10/24/24 Bilat Dx mammo and right breast US  Hx: right breast mass  Estimated lifetime risk of breast cancer calculated to be 12.03% based on the Tyrer-Cuzick model, which is considered average risk.     MAMMOGRAM:  Tissue density: The breasts are heterogeneously dense, which may obscure small masses. (#BDC)     Within the right breast, there is a large partially calcified mass measuring at least 18 cm.   This occupies nearly the entire breast.   A few benign coarse calcifications are scattered in the right breast.     In the left breast, there is dense heterogeneous nodular tissue with scattered densities but no evidence of

## 2025-02-04 NOTE — FLOWSHEET NOTE
02/03/25 2020   Food Insecurity   Within the past 12 months, you worried that your food would run out before you got the money to buy more. Never true   Within the past 12 months, the food you bought just didn't last and you didn't have money to get more. Never true   Transportation Needs   In the past 12 months, has lack of transportation kept you from medical appointments or from getting medications? no   In the past 12 months, has lack of transportation kept you from meetings, work, or from getting things needed for daily living? No   Housing Stability   In the last 12 months, was there a time when you were not able to pay the mortgage or rent on time? N   In the past 12 months, how many times have you moved where you were living? 0   At any time in the past 12 months, were you homeless or living in a shelter (including now)? N   Utilities   In the past 12 months has the electric, gas, oil, or water company threatened to shut off services in your home? No   Requested Resources   Would you like resources for any of these topics? Food Insecurity  (Patient would like assistance in purchasing Ensures. Patient w/ excessive amount of weight loss and depends on ensures, per patient)     Patient would like to discuss possible assistance to help with purchasing Ensures to help with diet and wound healing.

## 2025-02-04 NOTE — PROGRESS NOTES
Patient w/ Bilateral mastectomy today. Writer called MD for clarity concerning Lab sticks to [facility]. MD approved for labs to be drawn from upper extremities at this time.

## 2025-02-12 ENCOUNTER — TELEPHONE (OUTPATIENT)
Dept: PRIMARY CARE CLINIC | Facility: CLINIC | Age: 65
End: 2025-02-12

## 2025-02-12 NOTE — TELEPHONE ENCOUNTER
Verbal orders 1 a week for 7 weeks for home health physical therapy.    [phone] Chintan Herrera home health nurse.

## 2025-02-19 ENCOUNTER — OFFICE VISIT (OUTPATIENT)
Dept: SURGERY | Age: 65
End: 2025-02-19

## 2025-02-19 VITALS — HEIGHT: 66 IN | WEIGHT: 169 LBS | BODY MASS INDEX: 27.16 KG/M2

## 2025-02-19 DIAGNOSIS — Z85.3 PERSONAL HISTORY OF MALIGNANT PHYLLOIDES TUMOR OF BREAST: ICD-10-CM

## 2025-02-19 DIAGNOSIS — Z90.13 S/P BILATERAL MASTECTOMY: Primary | ICD-10-CM

## 2025-02-19 DIAGNOSIS — Z85.3 PERSONAL HISTORY OF MALIGNANT PHYLLOIDES TUMOR OF BREAST: Primary | ICD-10-CM

## 2025-02-19 DIAGNOSIS — R59.0 AXILLARY ADENOPATHY: ICD-10-CM

## 2025-02-19 PROCEDURE — 99024 POSTOP FOLLOW-UP VISIT: CPT | Performed by: SURGERY

## 2025-02-19 NOTE — PROGRESS NOTES
metoplasia, improved with metoclopramide    Left foot pain 09/11/2019    Added automatically from request for surgery [id number]      Phyllodes neoplasm of breast     UTI (urinary tract infection)     UTI (urinary tract infection) 01/17/2025    admitted to [facility]     Past Surgical History:   Procedure Laterality Date    AXILLARY SURGERY Right 2/3/2025    AXILLARY RIGHT SENTINEL NODE BIOPSY performed by Rod Cabral MD at [facility] MAIN OR    FOOT SURGERY Left 2019    Removal of foreign body    MASTECTOMY Bilateral 2/3/2025    BREAST MASTECTOMY BILATERAL performed by Rod Cabral MD at [facility] MAIN OR    UPPER GASTROINTESTINAL ENDOSCOPY N/A 12/20/2024    ESOPHAGOGASTRODUODENOSCOPY DILATION SAVORY, biopsy performed by Angy Curtis MD at [facility] ENDOSCOPY    US BIOPSY LYMPH NODE  12/19/2024    US BIOPSY LYMPH NODE 12/19/2024 Rod Zhao MD [facility] RADIOLOGY US    US BREAST BIOPSY W LOC DEVICE 1ST LESION RIGHT Right 10/30/2024    US BREAST LYMPH NODE BIOPSY RIGHT 10/30/2024 Oma Washington MD [facility] RADIOLOGY MAMMO    US BREAST BIOPSY W LOC DEVICE 1ST LESION RIGHT Right 10/30/2024    US BREAST BIOPSY W LOC DEVICE 1ST LESION RIGHT 10/30/2024 Oma Washington MD [facility] RADIOLOGY MAMMO     Current Outpatient Medications   Medication Sig    MAGNESIUM-OXIDE 400 (240 Mg) MG tablet Take 1 tablet by mouth daily    metoprolol tartrate (LOPRESSOR) 25 MG tablet Take 1 tablet by mouth 2 times daily for 10 days HOLD IF SYSTOLIC BLOOD PRESSURE LESS THEN 100 MMHG    ferrous sulfate (IRON 325) 325 (65 Fe) MG tablet Take 1 tablet by mouth daily (with breakfast)    ondansetron (ZOFRAN) 4 MG tablet Take 1 tablet by mouth 3 times daily as needed for Nausea or Vomiting     No current facility-administered medications for this visit.     ALLERGIES:  Other and Keflex [cephalexin]    Social History     Socioeconomic History    Marital status: Single   Tobacco Use    Smoking status: Never     Passive exposure: Past    Smokeless tobacco:

## 2025-02-27 NOTE — PROGRESS NOTES
NEW PATIENT ABSTRACT            Referral Diagnosis: Malignant Phyloddes tumor     Referring Provider: Rod Cabral MD    Primary Care Provider: Sara Newell APRN - NP    Presenting Symptoms: Present with large right breast mass     Family History of Cancer: Cancer-related family history includes Breast Cancer in her maternal grandmother; Colon Cancer in her maternal grandmother; Uterine Cancer in her maternal grandmother.    Past Medical History:   Past Medical History:   Diagnosis Date    Anemia     2 blood transfusions during hospitalization 1/17/25-1/25/25    Axillary adenopathy     Breast cancer (HCC) 2024    malignant phylloides tumor of right breast    Elevated BP without diagnosis of hypertension     High anion gap metabolic acidosis 12/2024 12/2024 and 1/2/25 admission    History of blood transfusion     during 1/17/25-1/25/25 hospitalization pt states 2 transfusions    Intractable nausea and vomiting 12/2024    admission x 2, EGD- gastric metoplasia, improved with metoclopramide    Left foot pain 09/11/2019    Added automatically from request for surgery [id number]      Phyllodes neoplasm of breast     UTI (urinary tract infection)     UTI (urinary tract infection) 01/17/2025    admitted to [facility]         Chronological History of Pertinent Events (From Onset of Presenting Symptoms):  10-24-24  Diagnostic Mammogram and/or Ultrasound  FINDINGS:  MAMMOGRAM:  Tissue density: The breasts are heterogeneously dense, which may  obscure small masses. (#BDC)     Within the right breast, there is a large partially calcified mass measuring at  least 18 cm. This occupies nearly the entire breast. A few benign coarse  calcifications are scattered in the right breast.     In the left breast, there is dense heterogeneous nodular tissue with scattered  densities but no evidence of a focal mass or distortion. There are no suspicious  calcifications. There are scattered benign coarse calcifications.

## 2025-03-03 ENCOUNTER — OFFICE VISIT (OUTPATIENT)
Dept: SURGERY | Age: 65
End: 2025-03-03

## 2025-03-03 VITALS — WEIGHT: 169 LBS | BODY MASS INDEX: 27.16 KG/M2 | HEIGHT: 66 IN

## 2025-03-03 DIAGNOSIS — Z85.3 PERSONAL HISTORY OF MALIGNANT PHYLLOIDES TUMOR OF BREAST: ICD-10-CM

## 2025-03-03 DIAGNOSIS — Z90.13 S/P BILATERAL MASTECTOMY: Primary | ICD-10-CM

## 2025-03-03 PROCEDURE — 99024 POSTOP FOLLOW-UP VISIT: CPT | Performed by: SURGERY

## 2025-03-03 NOTE — PROGRESS NOTES
H&P/Consult Note/Progress Note/Office Note:   July Kerr  MRN: [id number]  :1960  Age:64 y.o.    HPI: July Kerr is a 64 y.o. female who is s/p  bilateral mastectomy and right axillary sent node cluster excision (no reconstruction)  for a pT4N0 massive malignant phyllodes tumor with abnormal indeterminate right axillary nodes on 2/3/25.      Her path showed a 24cm malignant phyllodes tumor in the right breast  All margins were clear including final deep margin  2 axillary nodes were negative for malignancy     Stromal Cellularity:  Marked   Stromal Atypia:  Marked   Stromal Overgrowth:  Present   Mitotic Rate:  34 mitoses per 10 HPFs   Histologic Tumor Border:  Infiltrative   Malignant Heterologous Elements:  Not identified     The right breast had no malignancy          She was previously scheduled for the same surgery on 25  but this canceled as she had a pre-syncopal episode   and was brought to the ER instead where she was admitted with sepsis from mastoiditis and UTI.  Leading up this admission she was profoundly weak and at times unable to get out of bed  WBC 17k  Low Na and Cl  +Leuk in urine  Chest CT showed no PE, effusion, or lung mets  Levaquin ordered for probably UTI  She was discharged on 24          She initially presented to surgery on 24 with a massive right breast neoplasm which was growing over many years.  She has large pendulous breasts and the right breast mass was massive and filled the majority of the breast.  She had imaging and biopsies shown below.        Maternal grandmother with history of breast cancer.    She did not come in for her office visit on 24--->I tried to call her but  no answer (left a generic message with no PHI)  She returned on 1/2/25        10/24/24 Bilat Dx mammo and right breast US  Hx: right breast mass  Estimated lifetime risk of breast cancer calculated to be 12.03% based on the Tyrer-Cuzick model, which is considered average

## 2025-03-10 ENCOUNTER — OFFICE VISIT (OUTPATIENT)
Dept: SURGERY | Age: 65
End: 2025-03-10

## 2025-03-10 VITALS — HEIGHT: 66 IN | BODY MASS INDEX: 27.16 KG/M2 | WEIGHT: 169 LBS

## 2025-03-10 DIAGNOSIS — Z85.3 PERSONAL HISTORY OF MALIGNANT PHYLLOIDES TUMOR OF BREAST: Primary | ICD-10-CM

## 2025-03-10 RX ORDER — SULFAMETHOXAZOLE AND TRIMETHOPRIM 800; 160 MG/1; MG/1
1 TABLET ORAL 2 TIMES DAILY
Qty: 20 TABLET | Refills: 0 | Status: SHIPPED | OUTPATIENT
Start: 2025-03-10 | End: 2025-03-20

## 2025-03-10 NOTE — PROGRESS NOTES
H&P/Consult Note/Progress Note/Office Note:   July Kerr  MRN: [id number]  :1960  Age:64 y.o.    HPI: July Kerr is a 64 y.o. female who is s/p  bilateral mastectomy and right axillary sent node cluster excision (no reconstruction)  for a pT4N0 massive malignant phyllodes tumor with abnormal indeterminate right axillary nodes on 2/3/25.      Her path showed a 24cm malignant phyllodes tumor in the right breast  All margins were clear including final deep margin  2 axillary nodes were negative for malignancy     Stromal Cellularity:  Marked   Stromal Atypia:  Marked   Stromal Overgrowth:  Present   Mitotic Rate:  34 mitoses per 10 HPFs   Histologic Tumor Border:  Infiltrative   Malignant Heterologous Elements:  Not identified     The right breast had no malignancy          She was previously scheduled for the same surgery on 25  but this canceled as she had a pre-syncopal episode   and was brought to the ER instead where she was admitted with sepsis from mastoiditis and UTI.  Leading up this admission she was profoundly weak and at times unable to get out of bed  WBC 17k  Low Na and Cl  +Leuk in urine  Chest CT showed no PE, effusion, or lung mets  Levaquin ordered for probably UTI  She was discharged on 24          She initially presented to surgery on 24 with a massive right breast neoplasm which was growing over many years.  She has large pendulous breasts and the right breast mass was massive and filled the majority of the breast.  She had imaging and biopsies shown below.        Maternal grandmother with history of breast cancer.    She did not come in for her office visit on 24--->I tried to call her but  no answer (left a generic message with no PHI)  She returned on 1/2/25        10/24/24 Bilat Dx mammo and right breast US  Hx: right breast mass  Estimated lifetime risk of breast cancer calculated to be 12.03% based on the Tyrer-Cuzick model, which is considered average

## 2025-03-13 LAB
BACTERIA SPEC CULT: ABNORMAL
SERVICE CMNT-IMP: ABNORMAL

## 2025-03-14 LAB
BACTERIA SPEC CULT: NORMAL
SERVICE CMNT-IMP: NORMAL

## 2025-03-17 LAB
BACTERIA SPEC CULT: NORMAL
SERVICE CMNT-IMP: NORMAL

## 2025-03-20 ENCOUNTER — HOSPITAL ENCOUNTER (OUTPATIENT)
Dept: LAB | Age: 65
Discharge: HOME | End: 2025-03-20
Payer: COMMERCIAL

## 2025-03-20 ENCOUNTER — OFFICE VISIT (OUTPATIENT)
Dept: ONCOLOGY | Age: 65
End: 2025-03-20

## 2025-03-20 VITALS
SYSTOLIC BLOOD PRESSURE: 124 MMHG | DIASTOLIC BLOOD PRESSURE: 77 MMHG | TEMPERATURE: 98.2 F | HEIGHT: 66 IN | HEART RATE: 85 BPM | OXYGEN SATURATION: 99 % | WEIGHT: 159.1 LBS | BODY MASS INDEX: 25.57 KG/M2 | RESPIRATION RATE: 16 BRPM

## 2025-03-20 DIAGNOSIS — D64.9 ANEMIA, UNSPECIFIED TYPE: ICD-10-CM

## 2025-03-20 DIAGNOSIS — Z85.3 PERSONAL HISTORY OF MALIGNANT PHYLLOIDES TUMOR OF BREAST: Primary | ICD-10-CM

## 2025-03-20 LAB
ALBUMIN SERPL-MCNC: 3.2 G/DL (ref 3.2–4.6)
ALBUMIN/GLOB SERPL: 0.8 (ref 1–1.9)
ALP SERPL-CCNC: 66 U/L (ref 35–104)
ALT SERPL-CCNC: 8 U/L (ref 8–45)
ANION GAP SERPL CALC-SCNC: 14 MMOL/L (ref 7–16)
AST SERPL-CCNC: 17 U/L (ref 15–37)
BASOPHILS # BLD: 0.03 K/UL (ref 0–0.2)
BASOPHILS NFR BLD: 0.8 % (ref 0–2)
BILIRUB SERPL-MCNC: 0.2 MG/DL (ref 0–1.2)
BUN SERPL-MCNC: 11 MG/DL (ref 8–23)
CALCIUM SERPL-MCNC: 10 MG/DL (ref 8.8–10.2)
CHLORIDE SERPL-SCNC: 104 MMOL/L (ref 98–107)
CO2 SERPL-SCNC: 22 MMOL/L (ref 20–29)
CREAT SERPL-MCNC: 0.74 MG/DL (ref 0.6–1.1)
DAT POLY-SP REAG RBC QL: NORMAL
DIFFERENTIAL METHOD BLD: ABNORMAL
EOSINOPHIL # BLD: 0.05 K/UL (ref 0–0.8)
EOSINOPHIL NFR BLD: 1.3 % (ref 0.5–7.8)
ERYTHROCYTE [DISTWIDTH] IN BLOOD BY AUTOMATED COUNT: 14.6 % (ref 11.9–14.6)
FERRITIN SERPL-MCNC: 352 NG/ML (ref 8–388)
FOLATE SERPL-MCNC: 6.8 NG/ML (ref 3.1–17.5)
GLOBULIN SER CALC-MCNC: 4.1 G/DL (ref 2.3–3.5)
GLUCOSE SERPL-MCNC: 101 MG/DL (ref 70–99)
HAPTOGLOB SERPL-MCNC: 195 MG/DL (ref 30–200)
HCT VFR BLD AUTO: 38.6 % (ref 35.8–46.3)
HGB BLD-MCNC: 12.1 G/DL (ref 11.7–15.4)
HGB RETIC QN AUTO: 35 PG (ref 29–35)
IMM GRANULOCYTES # BLD AUTO: 0.01 K/UL (ref 0–0.5)
IMM GRANULOCYTES NFR BLD AUTO: 0.3 % (ref 0–5)
IMM RETICS NFR: 9.2 % (ref 3–15.9)
IRON SATN MFR SERPL: 28 % (ref 20–50)
IRON SERPL-MCNC: 70 UG/DL (ref 35–100)
LYMPHOCYTES # BLD: 1.68 K/UL (ref 0.5–4.6)
LYMPHOCYTES NFR BLD: 43.9 % (ref 13–44)
MCH RBC QN AUTO: 30 PG (ref 26.1–32.9)
MCHC RBC AUTO-ENTMCNC: 31.3 G/DL (ref 31.4–35)
MCV RBC AUTO: 95.5 FL (ref 82–102)
MONOCYTES # BLD: 0.18 K/UL (ref 0.1–1.3)
MONOCYTES NFR BLD: 4.7 % (ref 4–12)
NEUTS SEG # BLD: 1.88 K/UL (ref 1.7–8.2)
NEUTS SEG NFR BLD: 49 % (ref 43–78)
NRBC # BLD: 0 K/UL (ref 0–0.2)
PLATELET # BLD AUTO: 355 K/UL (ref 150–450)
PMV BLD AUTO: 9.5 FL (ref 9.4–12.3)
POTASSIUM SERPL-SCNC: 4 MMOL/L (ref 3.5–5.1)
PROT SERPL-MCNC: 7.3 G/DL (ref 6.3–8.2)
RBC # BLD AUTO: 4.04 M/UL (ref 4.05–5.2)
RETICS # AUTO: 0.09 M/UL (ref 0.03–0.1)
RETICS/RBC NFR AUTO: 2.2 % (ref 0.3–2)
SODIUM SERPL-SCNC: 140 MMOL/L (ref 136–145)
TIBC SERPL-MCNC: 245 UG/DL (ref 240–450)
UIBC SERPL-MCNC: 175 UG/DL (ref 112–347)
VIT B12 SERPL-MCNC: 574 PG/ML (ref 193–986)
WBC # BLD AUTO: 3.8 K/UL (ref 4.3–11.1)

## 2025-03-20 PROCEDURE — 83010 ASSAY OF HAPTOGLOBIN QUANT: CPT

## 2025-03-20 PROCEDURE — 85025 COMPLETE CBC W/AUTO DIFF WBC: CPT

## 2025-03-20 PROCEDURE — 86880 COOMBS TEST DIRECT: CPT

## 2025-03-20 PROCEDURE — 82728 ASSAY OF FERRITIN: CPT

## 2025-03-20 PROCEDURE — 82525 ASSAY OF COPPER: CPT

## 2025-03-20 PROCEDURE — 83550 IRON BINDING TEST: CPT

## 2025-03-20 PROCEDURE — 36415 COLL VENOUS BLD VENIPUNCTURE: CPT

## 2025-03-20 PROCEDURE — 82607 VITAMIN B-12: CPT

## 2025-03-20 PROCEDURE — 85046 RETICYTE/HGB CONCENTRATE: CPT

## 2025-03-20 PROCEDURE — 83540 ASSAY OF IRON: CPT

## 2025-03-20 PROCEDURE — 82746 ASSAY OF FOLIC ACID SERUM: CPT

## 2025-03-20 PROCEDURE — 80053 COMPREHEN METABOLIC PANEL: CPT

## 2025-03-20 ASSESSMENT — PATIENT HEALTH QUESTIONNAIRE - PHQ9
1. LITTLE INTEREST OR PLEASURE IN DOING THINGS: NOT AT ALL
SUM OF ALL RESPONSES TO PHQ QUESTIONS 1-9: 0
2. FEELING DOWN, DEPRESSED OR HOPELESS: NOT AT ALL
SUM OF ALL RESPONSES TO PHQ QUESTIONS 1-9: 0

## 2025-03-20 NOTE — PATIENT INSTRUCTIONS
Patient Information from Today's Visit    The members of your [facility] are listed below:    Physician Provider: Blanca Burgos, Medical Oncologist  Registered Nurse: Magda OCONNOR RN  Nurse Navigator: Radha PORETR RN, Jessica RUFFIN RN  Medical Assistant: Ortiz OCONNOR MA  :Sasha LABOY   Supportive Care Services: Jad GARCIA LM    Diagnosis: History of Phylloides Tumor of Breast    Follow Up Instructions: 6 months          Current Labs: Labs to be drawn after today's office visit          Please refer to After Visit Summary or [facility] for upcoming appointment information. Please call our office for rescheduling needs at least 24 hours before your scheduled appointment time.If you have any questions regarding your upcoming schedule please reach out to your care team through [facility] or call [phone].     Please notify your assigned Nurse Navigator of any unplanned hospital admissions or Emergency Room visits within 24 hours of discharge.    -------------------------------------------------------------------------------------------------------------------  Please call our office at [phone] if you have any  of the following symptoms:   Fever of 100.5 or greater  Chills  Shortness of breath  Swelling or pain in one leg    After office hours an answering service is available and will contact a provider for emergencies or if you are experiencing any of the above symptoms.        LALITO CESAR MA

## 2025-03-20 NOTE — PROGRESS NOTES
[facility]: Office Visit New Patient H & P    Chief Complaint:    Chief Complaint   Patient presents with    New Patient         History of Present Illness:  Ms. Kerr is a 64 y.o. female who was referred to me for evaluation of breast phyllodes tumor.    She states she is healing well from surgery.  Denies any issues.  She says before the surgery she lost 65 pounds in 3 to 4 months.    Patient was found to be anemic on recent labs.  Will get anemia workup.  Patient presented to the hospital with nausea vomiting and had endoscopy on 12/20/2024 by Dr. Curtis which showed normal esophagus normal stomach and mucosal changes in duodenum.  She did have esophageal dilation performed.  Path showed gastric metaplasia from duodenal biopsy.    She also had presyncope and underwent CT PE which was negative for PE.    Currently on ferrous sulfate daily        Workup so far:  10/24/2024 diagnostic bilateral mammogram  Ultrasound of the right breast shows a cystic and solid mass occupying the  entire breast measured at 13 x 11 x 13 cm, although the entirety of the lesion  was not visualized. There is internal blood flow. A lymph node in the low axilla  shows a prominent cortex measuring 4-5 mm.     IMPRESSION: Indeterminate complex mass in the right breast, measuring about 18 cm on mammogram.   Prominent right axillary lymph node.   No suspicious findings in the left breast on mammogram.    10/30/2024 ultrasound-guided biopsy of right breast lesion and axillary lymph node  INAL PATHOLOGIC DIAGNOSIS     A:  Right breast, 12 o'clock subareolar, ultrasound-guided core needle   biopsy:   -         Phyllodes tumor with marked stromal atypia and mitotic activity   (see comment).     B:  Lymph node, right axilla, ultrasound-guided core needle biopsy:   -          Benign lymph node cores.     Comment: Sections of the right breast cores show sampling of a phyllodes   tumor with marked stromal cytologic atypia and

## 2025-03-21 ENCOUNTER — RESULTS FOLLOW-UP (OUTPATIENT)
Dept: LAB | Age: 65
End: 2025-03-21

## 2025-03-21 DIAGNOSIS — D64.9 ANEMIA, UNSPECIFIED TYPE: ICD-10-CM

## 2025-03-21 LAB — COPPER SERPL-MCNC: 42 UG/DL (ref 80–158)

## 2025-03-24 ENCOUNTER — TELEPHONE (OUTPATIENT)
Dept: ONCOLOGY | Age: 65
End: 2025-03-24

## 2025-03-24 ENCOUNTER — OFFICE VISIT (OUTPATIENT)
Dept: SURGERY | Age: 65
End: 2025-03-24

## 2025-03-24 DIAGNOSIS — Z90.13 S/P BILATERAL MASTECTOMY: ICD-10-CM

## 2025-03-24 DIAGNOSIS — R59.0 AXILLARY ADENOPATHY: Primary | ICD-10-CM

## 2025-03-24 DIAGNOSIS — D49.2 SOFT TISSUE NEOPLASM: ICD-10-CM

## 2025-03-24 DIAGNOSIS — Z85.3 PERSONAL HISTORY OF MALIGNANT PHYLLOIDES TUMOR OF BREAST: ICD-10-CM

## 2025-03-24 LAB — PATH REV BLD -IMP: NORMAL

## 2025-03-24 PROCEDURE — 99024 POSTOP FOLLOW-UP VISIT: CPT | Performed by: SURGERY

## 2025-03-24 NOTE — PROGRESS NOTES
H&P/Consult Note/Progress Note/Office Note:   July Kerr  MRN: [id number]  :1960  Age:64 y.o.    HPI: July Kerr is a 64 y.o. female who is s/p  bilateral mastectomy and right axillary sent node cluster excision (no reconstruction)  for a pT4N0 massive malignant phyllodes tumor with abnormal indeterminate right axillary nodes on 2/3/25.      Her path showed a 24cm malignant phyllodes tumor in the right breast  All margins were clear including final deep margin  2 axillary nodes were negative for malignancy     Stromal Cellularity:  Marked   Stromal Atypia:  Marked   Stromal Overgrowth:  Present   Mitotic Rate:  34 mitoses per 10 HPFs   Histologic Tumor Border:  Infiltrative   Malignant Heterologous Elements:  Not identified     The right breast had no malignancy          She was previously scheduled for the same surgery on 25  but this canceled as she had a pre-syncopal episode   and was brought to the ER instead where she was admitted with sepsis from mastoiditis and UTI.  Leading up this admission she was profoundly weak and at times unable to get out of bed  WBC 17k  Low Na and Cl  +Leuk in urine  Chest CT showed no PE, effusion, or lung mets  Levaquin ordered for probably UTI  She was discharged on 24          She initially presented to surgery on 24 with a massive right breast neoplasm which was growing over many years.  She has large pendulous breasts and the right breast mass was massive and filled the majority of the breast.  She had imaging and biopsies shown below.        Maternal grandmother with history of breast cancer.    She did not come in for her office visit on 24--->I tried to call her but  no answer (left a generic message with no PHI)  She returned on 1/2/25        10/24/24 Bilat Dx mammo and right breast US  Hx: right breast mass  Estimated lifetime risk of breast cancer calculated to be 12.03% based on the Tyrer-Cuzick model, which is considered average

## 2025-03-25 NOTE — TELEPHONE ENCOUNTER
Notified patient that her copper level was low and that Dr. Burgos wanted her to take copper supplements. More detailed directions sent via [facility].    ----- Message from Dr. Blanca Burgos MD sent at 3/25/2025  9:53 AM EDT -----  Please ask her to take oral copper, 6 mg of copper each day orally for 2 weeks, 4 mg for the third week, and 2 mg thereafter for a month. Thanks. Add copper to the follow up labs.

## 2025-04-02 ENCOUNTER — HOSPITAL ENCOUNTER (OUTPATIENT)
Dept: RADIATION ONCOLOGY | Age: 65
Setting detail: RECURRING SERIES
Discharge: HOME | End: 2025-04-05
Payer: COMMERCIAL

## 2025-04-02 VITALS
WEIGHT: 169.7 LBS | SYSTOLIC BLOOD PRESSURE: 157 MMHG | HEART RATE: 86 BPM | BODY MASS INDEX: 27.39 KG/M2 | TEMPERATURE: 98.5 F | DIASTOLIC BLOOD PRESSURE: 82 MMHG | OXYGEN SATURATION: 99 % | RESPIRATION RATE: 16 BRPM

## 2025-04-02 PROCEDURE — 99211 OFF/OP EST MAY X REQ PHY/QHP: CPT

## 2025-04-02 NOTE — PROGRESS NOTES
Consult Right Breast Cancer.  Pt arrived with shon for consult.  Pet scan 12-11-24.   Bilateral Mastectomies/ [facility] 2-3-25.  No previous RT.  Pt denies pain.  She is post menopause.  Pt is declining radiation.   No patient teaching was done.    Sheri Pena RN      .

## 2025-04-02 NOTE — PROGRESS NOTES
[facility] RADIATION ONCOLOGY CONSULTATION    Patient: July Kerr MRN: [id number]  SSN: [id number]    YOB: 1960  Age: 64 y.o.  Sex: female      Other Providers:  Rod Cabral MD    CHIEF COMPLAINT: Palpable right breast mass    DIAGNOSIS: pT4N0 malignant phyllodes tumor with sarcomatous overgrowth     PREVIOUS RADIATION TREATMENT:  None    HISTORY OF PRESENT ILLNESS:  July Kerr is a 64 y.o. female who I am seeing at the request of Rod Cabral MD .     Ms. Kerr was in her usual state of health until 10/24 at which time she noted a palpable mass in the right breast. Diagnostic mammogram 10/24/24 showed an indeterminate mass the right breast and prominent right axillary node. Biopsy 10/30/24 of the right breast showed phyllodes tumor and the right lymph node was negative. PET/CT 12/11/24 showed the intense FDG avid right breast malignancy and indeterminate subcentimeter right axillary nodes. Repeat right axillary biopsy 12/26/24 shows scant fragments of the lymph node with focal acellular mucin and associated foreign body giant cell reaction. On 2/3/25 she underwent bilateral mastectomy and right axillary sentinel node excision. Final pathology from the left breast showed sclerosing adenosis with microcalcifications. The right breast showed malignant phyllodes tumor with sarcomatous overgrowth measuring 24cm with negative margins and no tumor in two lymph nodes.  She denies pain but does report some decreased range of motion.    PAST MEDICAL HISTORY:    Past Medical History:   Diagnosis Date    Anemia     2 blood transfusions during hospitalization 1/17/25-1/25/25    Axillary adenopathy     Breast cancer (HCC) 2024    malignant phylloides tumor of right breast    Elevated BP without diagnosis of hypertension     High anion gap metabolic acidosis 12/2024 12/2024 and 1/2/25 admission    History of blood transfusion     during 1/17/25-1/25/25

## 2025-07-23 ENCOUNTER — OFFICE VISIT (OUTPATIENT)
Dept: SURGERY | Age: 65
End: 2025-07-23
Payer: COMMERCIAL

## 2025-07-23 VITALS — BODY MASS INDEX: 27.16 KG/M2 | HEIGHT: 66 IN | WEIGHT: 169 LBS

## 2025-07-23 DIAGNOSIS — D49.2 SOFT TISSUE NEOPLASM: ICD-10-CM

## 2025-07-23 DIAGNOSIS — R59.0 AXILLARY ADENOPATHY: Primary | ICD-10-CM

## 2025-07-23 DIAGNOSIS — Z85.3 PERSONAL HISTORY OF MALIGNANT PHYLLOIDES TUMOR OF BREAST: ICD-10-CM

## 2025-07-23 PROCEDURE — 99214 OFFICE O/P EST MOD 30 MIN: CPT | Performed by: SURGERY

## 2025-07-23 NOTE — PROGRESS NOTES
and 1/2/25 admission    History of blood transfusion     during 1/17/25-1/25/25 hospitalization pt states 2 transfusions    Intractable nausea and vomiting 12/2024    admission x 2, EGD- gastric metoplasia, improved with metoclopramide    Left foot pain 09/11/2019    Added automatically from request for surgery [id number]      Phyllodes neoplasm of breast     UTI (urinary tract infection)     UTI (urinary tract infection) 01/17/2025    admitted to [facility]     Past Surgical History:   Procedure Laterality Date    AXILLARY SURGERY Right 2/3/2025    AXILLARY RIGHT SENTINEL NODE BIOPSY performed by Rod Cabral MD at [facility] MAIN OR    FOOT SURGERY Left 2019    Removal of foreign body    MASTECTOMY Bilateral 2/3/2025    BREAST MASTECTOMY BILATERAL performed by Rod Cabral MD at [facility] MAIN OR    UPPER GASTROINTESTINAL ENDOSCOPY N/A 12/20/2024    ESOPHAGOGASTRODUODENOSCOPY DILATION SAVORY, biopsy performed by Angy Curtis MD at [facility] ENDOSCOPY    US BIOPSY LYMPH NODE  12/19/2024    US BIOPSY LYMPH NODE 12/19/2024 Rod Zhao MD [facility] RADIOLOGY US    US BREAST BIOPSY W LOC DEVICE 1ST LESION RIGHT Right 10/30/2024    US BREAST LYMPH NODE BIOPSY RIGHT 10/30/2024 Oma Washington MD [facility] RADIOLOGY MAMMO    US BREAST BIOPSY W LOC DEVICE 1ST LESION RIGHT Right 10/30/2024    US BREAST BIOPSY W LOC DEVICE 1ST LESION RIGHT 10/30/2024 Oma Washington MD [facility] RADIOLOGY MAMMO     Current Outpatient Medications   Medication Sig    COPPER PO Take 6 mg by mouth 6 right now. Then to 2 after 14 days. After a month stop    MAGNESIUM-OXIDE 400 (240 Mg) MG tablet Take 1 tablet by mouth daily (Patient not taking: Reported on 4/2/2025)    metoprolol tartrate (LOPRESSOR) 25 MG tablet Take 1 tablet by mouth 2 times daily for 10 days HOLD IF SYSTOLIC BLOOD PRESSURE LESS THEN 100 MMHG    ferrous sulfate (IRON 325) 325 (65 Fe) MG tablet Take 1 tablet by mouth daily (with breakfast)    ondansetron (ZOFRAN) 4 MG tablet Take 1 tablet

## (undated) DEVICE — MOUTHPIECE ENDOSCP L CTRL OPN AND SIDE PORTS DISP

## (undated) DEVICE — APPLICATOR MEDICATED 26 CC SOLUTION HI LT ORNG CHLORAPREP

## (undated) DEVICE — GLOVE SURG SZ 7 L12IN FNGR THK79MIL GRN LTX FREE

## (undated) DEVICE — ENDOSCOPIC KIT 1.1+ OP4 CA DE 2 GWN AAMI LEVEL 3

## (undated) DEVICE — NEEDLE HYPO 21GA L1IN GRN S STL HUB POLYPR SHLD REG BVL

## (undated) DEVICE — SINGLE PORT MANIFOLD: Brand: NEPTUNE 2

## (undated) DEVICE — 3M™ TEGADERM™ TRANSPARENT FILM DRESSING FRAME STYLE, 1628, 6 IN X 8 IN (15 CM X 20 CM), 10/CT 8CT/CASE: Brand: 3M™ TEGADERM™

## (undated) DEVICE — CONTAINER FORMALIN PREFILLED 10% NBF 60ML

## (undated) DEVICE — PAD,NON-ADHERENT,3X8,STERILE,LF,1/PK: Brand: MEDLINE

## (undated) DEVICE — UNIVERSAL DRAPES: Brand: MEDLINE INDUSTRIES, INC.

## (undated) DEVICE — Device

## (undated) DEVICE — SUTURE VICRYL + SZ 3-0 L18IN ABSRB UD SH 1/2 CIR TAPERCUT NDL VCP864D

## (undated) DEVICE — KENDALL RADIOLUCENT FOAM MONITORING ELECTRODE RECTANGULAR SHAPE: Brand: KENDALL

## (undated) DEVICE — SOLUTION IRRIG 1000ML 0.9% SOD CHL USP POUR PLAS BTL

## (undated) DEVICE — GLOVE SURG SZ 65 THK91MIL LTX FREE SYN POLYISOPRENE

## (undated) DEVICE — MINOR SPLIT GENERAL: Brand: MEDLINE INDUSTRIES, INC.

## (undated) DEVICE — CONNECTOR TBNG OD5-7MM O2 END DISP

## (undated) DEVICE — COVER US PRB W12XL244CM FLD IORT STR TIP

## (undated) DEVICE — SUTURE VICRYL SZ 3-0 L18IN ABSRB UD L26MM SH 1/2 CIR J864D

## (undated) DEVICE — GUIDEWIRE WITH SPRING TIP - SINGLE USE: Brand: SAFEGUIDE®

## (undated) DEVICE — GAUZE,SPONGE,4"X4",16PLY,STRL,LF,10/TRAY: Brand: MEDLINE

## (undated) DEVICE — BLOCK BITE AD 60FR W/ VELC STRP ADDRESSES MOST PT AND

## (undated) DEVICE — GAUZE,SPONGE,4"X4",12PLY,WOVEN,NS,LF: Brand: MEDLINE

## (undated) DEVICE — RESERVOIR,SUCTION,100CC,SILICONE: Brand: MEDLINE

## (undated) DEVICE — 3M™ TEGADERM™ TRANSPARENT FILM DRESSING FRAME STYLE, 1629, 8 IN X 12 IN (20 CM X 30 CM), 10/CT 8CT/CASE: Brand: 3M™ TEGADERM™

## (undated) DEVICE — FORCEPS BX L240CM JAW DIA2.8MM L CAP W/ NDL MIC MESH TOOTH

## (undated) DEVICE — SUTURE PERMAHAND SZ 2-0 L18IN NONABSORBABLE BLK L26MM SH C012D

## (undated) DEVICE — AIRLIFE™ OXYGEN TUBING 7 FEET (2.1 M) CRUSH RESISTANT OXYGEN TUBING, VINYL TIPPED: Brand: AIRLIFE™

## (undated) DEVICE — DRAIN SURG 19FR 100% SIL RADPQ RND CHN FULL FLUT

## (undated) DEVICE — SUTURE PERMAHAND SZ 2-0 L18IN NONABSORBABLE BLK L26MM PS 1588H

## (undated) DEVICE — APPLIER CLP L9.38IN M LIG TI DISP STR RNG HNDL LIGACLP

## (undated) DEVICE — 3M™ TEGADERM™ TRANSPARENT FILM DRESSING FRAME STYLE, 1626W, 4 IN X 4-3/4 IN (10 CM X 12 CM), 50/CT 4CT/CASE: Brand: 3M™ TEGADERM™